# Patient Record
Sex: FEMALE | Race: WHITE | NOT HISPANIC OR LATINO | Employment: OTHER | URBAN - NONMETROPOLITAN AREA
[De-identification: names, ages, dates, MRNs, and addresses within clinical notes are randomized per-mention and may not be internally consistent; named-entity substitution may affect disease eponyms.]

---

## 2019-05-07 ENCOUNTER — APPOINTMENT (OUTPATIENT)
Dept: LAB | Facility: CLINIC | Age: 69
End: 2019-05-07
Payer: COMMERCIAL

## 2019-05-07 ENCOUNTER — TRANSCRIBE ORDERS (OUTPATIENT)
Dept: LAB | Facility: CLINIC | Age: 69
End: 2019-05-07

## 2019-05-07 DIAGNOSIS — I10 ESSENTIAL HYPERTENSION, MALIGNANT: Primary | ICD-10-CM

## 2019-05-07 DIAGNOSIS — R53.83 FATIGUE, UNSPECIFIED TYPE: ICD-10-CM

## 2019-05-07 DIAGNOSIS — Z79.1 ENCOUNTER FOR LONG-TERM (CURRENT) USE OF NON-STEROIDAL ANTI-INFLAMMATORIES: ICD-10-CM

## 2019-05-07 DIAGNOSIS — I10 ESSENTIAL HYPERTENSION, MALIGNANT: ICD-10-CM

## 2019-05-07 DIAGNOSIS — E55.9 AVITAMINOSIS D: ICD-10-CM

## 2019-05-07 LAB
25(OH)D3 SERPL-MCNC: 8.5 NG/ML (ref 30–100)
ALBUMIN SERPL BCP-MCNC: 4.1 G/DL (ref 3.5–5)
ALP SERPL-CCNC: 115 U/L (ref 46–116)
ALT SERPL W P-5'-P-CCNC: 29 U/L (ref 12–78)
ANION GAP SERPL CALCULATED.3IONS-SCNC: 7 MMOL/L (ref 4–13)
AST SERPL W P-5'-P-CCNC: 20 U/L (ref 5–45)
BILIRUB SERPL-MCNC: 0.64 MG/DL (ref 0.2–1)
BUN SERPL-MCNC: 22 MG/DL (ref 5–25)
CALCIUM SERPL-MCNC: 9.1 MG/DL (ref 8.3–10.1)
CHLORIDE SERPL-SCNC: 106 MMOL/L (ref 100–108)
CHOLEST SERPL-MCNC: 180 MG/DL (ref 50–200)
CO2 SERPL-SCNC: 26 MMOL/L (ref 21–32)
CREAT SERPL-MCNC: 0.7 MG/DL (ref 0.6–1.3)
ERYTHROCYTE [DISTWIDTH] IN BLOOD BY AUTOMATED COUNT: 13.5 % (ref 11.6–15.1)
GFR SERPL CREATININE-BSD FRML MDRD: 88 ML/MIN/1.73SQ M
GLUCOSE P FAST SERPL-MCNC: 100 MG/DL (ref 65–99)
HCT VFR BLD AUTO: 44.4 % (ref 34.8–46.1)
HDLC SERPL-MCNC: 62 MG/DL (ref 40–60)
HGB BLD-MCNC: 14.2 G/DL (ref 11.5–15.4)
LDLC SERPL CALC-MCNC: 87 MG/DL (ref 0–100)
MCH RBC QN AUTO: 29.9 PG (ref 26.8–34.3)
MCHC RBC AUTO-ENTMCNC: 32 G/DL (ref 31.4–37.4)
MCV RBC AUTO: 94 FL (ref 82–98)
NONHDLC SERPL-MCNC: 118 MG/DL
PLATELET # BLD AUTO: 252 THOUSANDS/UL (ref 149–390)
PMV BLD AUTO: 9.8 FL (ref 8.9–12.7)
POTASSIUM SERPL-SCNC: 4.2 MMOL/L (ref 3.5–5.3)
PROT SERPL-MCNC: 7.9 G/DL (ref 6.4–8.2)
RBC # BLD AUTO: 4.75 MILLION/UL (ref 3.81–5.12)
SODIUM SERPL-SCNC: 139 MMOL/L (ref 136–145)
T4 FREE SERPL-MCNC: 1.22 NG/DL (ref 0.76–1.46)
TRIGL SERPL-MCNC: 153 MG/DL
TSH SERPL DL<=0.05 MIU/L-ACNC: 2.09 UIU/ML (ref 0.36–3.74)
WBC # BLD AUTO: 8.45 THOUSAND/UL (ref 4.31–10.16)

## 2019-05-07 PROCEDURE — 84439 ASSAY OF FREE THYROXINE: CPT

## 2019-05-07 PROCEDURE — 82306 VITAMIN D 25 HYDROXY: CPT

## 2019-05-07 PROCEDURE — 36415 COLL VENOUS BLD VENIPUNCTURE: CPT

## 2019-05-07 PROCEDURE — 80053 COMPREHEN METABOLIC PANEL: CPT

## 2019-05-07 PROCEDURE — 80061 LIPID PANEL: CPT

## 2019-05-07 PROCEDURE — 85027 COMPLETE CBC AUTOMATED: CPT

## 2019-05-07 PROCEDURE — 86803 HEPATITIS C AB TEST: CPT

## 2019-05-07 PROCEDURE — 84443 ASSAY THYROID STIM HORMONE: CPT

## 2019-05-08 LAB — HCV AB SER QL: NORMAL

## 2019-08-26 ENCOUNTER — HOSPITAL ENCOUNTER (INPATIENT)
Facility: HOSPITAL | Age: 69
LOS: 3 days | DRG: 871 | End: 2019-08-29
Attending: FAMILY MEDICINE | Admitting: INTERNAL MEDICINE
Payer: COMMERCIAL

## 2019-08-26 ENCOUNTER — APPOINTMENT (EMERGENCY)
Dept: CT IMAGING | Facility: HOSPITAL | Age: 69
DRG: 871 | End: 2019-08-26
Payer: COMMERCIAL

## 2019-08-26 ENCOUNTER — APPOINTMENT (EMERGENCY)
Dept: RADIOLOGY | Facility: HOSPITAL | Age: 69
DRG: 871 | End: 2019-08-26
Payer: COMMERCIAL

## 2019-08-26 ENCOUNTER — APPOINTMENT (INPATIENT)
Dept: NON INVASIVE DIAGNOSTICS | Facility: HOSPITAL | Age: 69
DRG: 871 | End: 2019-08-26
Payer: COMMERCIAL

## 2019-08-26 DIAGNOSIS — J18.9 BILATERAL PNEUMONIA: ICD-10-CM

## 2019-08-26 DIAGNOSIS — I48.91 ATRIAL FIBRILLATION WITH RAPID VENTRICULAR RESPONSE (HCC): ICD-10-CM

## 2019-08-26 DIAGNOSIS — R93.89 ABNORMAL CT OF THE CHEST: ICD-10-CM

## 2019-08-26 DIAGNOSIS — I51.7 CARDIOMEGALY: ICD-10-CM

## 2019-08-26 DIAGNOSIS — A41.9 SEPSIS (HCC): Primary | ICD-10-CM

## 2019-08-26 DIAGNOSIS — R09.02 HYPOXIA: ICD-10-CM

## 2019-08-26 DIAGNOSIS — J96.01 ACUTE RESPIRATORY FAILURE WITH HYPOXIA (HCC): ICD-10-CM

## 2019-08-26 DIAGNOSIS — N30.00 ACUTE CYSTITIS: ICD-10-CM

## 2019-08-26 DIAGNOSIS — I10 HYPERTENSION: ICD-10-CM

## 2019-08-26 DIAGNOSIS — E66.01 MORBID OBESITY (HCC): ICD-10-CM

## 2019-08-26 DIAGNOSIS — K80.20 CHOLELITHIASIS: ICD-10-CM

## 2019-08-26 DIAGNOSIS — J90 PLEURAL EFFUSION, BILATERAL: ICD-10-CM

## 2019-08-26 DIAGNOSIS — I48.91 ATRIAL FIBRILLATION WITH RVR (HCC): ICD-10-CM

## 2019-08-26 LAB
ALBUMIN SERPL BCP-MCNC: 4.2 G/DL (ref 3.5–5.7)
ALP SERPL-CCNC: 98 U/L (ref 55–165)
ALT SERPL W P-5'-P-CCNC: 18 U/L (ref 7–52)
ANION GAP SERPL CALCULATED.3IONS-SCNC: 11 MMOL/L (ref 4–13)
APTT PPP: 34 SECONDS (ref 23–37)
AST SERPL W P-5'-P-CCNC: 13 U/L (ref 13–39)
ATRIAL RATE: 170 BPM
BACTERIA UR QL AUTO: ABNORMAL /HPF
BASOPHILS # BLD AUTO: 0.1 THOUSANDS/ΜL (ref 0–0.1)
BASOPHILS NFR BLD AUTO: 0 % (ref 0–2)
BILIRUB SERPL-MCNC: 1.5 MG/DL (ref 0.2–1)
BILIRUB UR QL STRIP: NEGATIVE
BNP SERPL-MCNC: 499 PG/ML (ref 1–100)
BUN SERPL-MCNC: 20 MG/DL (ref 7–25)
CALCIUM SERPL-MCNC: 9.4 MG/DL (ref 8.6–10.5)
CHLORIDE SERPL-SCNC: 101 MMOL/L (ref 98–107)
CLARITY UR: ABNORMAL
CO2 SERPL-SCNC: 24 MMOL/L (ref 21–31)
COLOR UR: YELLOW
CREAT SERPL-MCNC: 0.83 MG/DL (ref 0.6–1.2)
DEPRECATED D DIMER PPP: 402 NG/ML (FEU)
EOSINOPHIL # BLD AUTO: 0 THOUSAND/ΜL (ref 0–0.61)
EOSINOPHIL NFR BLD AUTO: 0 % (ref 0–5)
ERYTHROCYTE [DISTWIDTH] IN BLOOD BY AUTOMATED COUNT: 13.7 % (ref 11.5–14.5)
GFR SERPL CREATININE-BSD FRML MDRD: 72 ML/MIN/1.73SQ M
GLUCOSE SERPL-MCNC: 173 MG/DL (ref 65–99)
GLUCOSE UR STRIP-MCNC: NEGATIVE MG/DL
HCT VFR BLD AUTO: 40.7 % (ref 42–47)
HGB BLD-MCNC: 13.5 G/DL (ref 12–16)
HGB UR QL STRIP.AUTO: ABNORMAL
INR PPP: 1.26 (ref 0.9–1.5)
KETONES UR STRIP-MCNC: NEGATIVE MG/DL
LACTATE SERPL-SCNC: 1.3 MMOL/L (ref 0.5–2)
LEUKOCYTE ESTERASE UR QL STRIP: ABNORMAL
LYMPHOCYTES # BLD AUTO: 1 THOUSANDS/ΜL (ref 0.6–4.47)
LYMPHOCYTES NFR BLD AUTO: 6 % (ref 21–51)
MCH RBC QN AUTO: 30.2 PG (ref 26–34)
MCHC RBC AUTO-ENTMCNC: 33.2 G/DL (ref 31–37)
MCV RBC AUTO: 91 FL (ref 81–99)
MONOCYTES # BLD AUTO: 1.7 THOUSAND/ΜL (ref 0.17–1.22)
MONOCYTES NFR BLD AUTO: 9 % (ref 2–12)
NEUTROPHILS # BLD AUTO: 15.7 THOUSANDS/ΜL (ref 1.4–6.5)
NEUTS SEG NFR BLD AUTO: 85 % (ref 42–75)
NITRITE UR QL STRIP: POSITIVE
NON-SQ EPI CELLS URNS QL MICRO: ABNORMAL /HPF
PH UR STRIP.AUTO: 5.5 [PH]
PLATELET # BLD AUTO: 263 THOUSANDS/UL (ref 149–390)
PMV BLD AUTO: 8.7 FL (ref 8.6–11.7)
POTASSIUM SERPL-SCNC: 3.6 MMOL/L (ref 3.5–5.5)
PROT SERPL-MCNC: 7.2 G/DL (ref 6.4–8.9)
PROT UR STRIP-MCNC: ABNORMAL MG/DL
PROTHROMBIN TIME: 14.6 SECONDS (ref 10.2–13)
QRS AXIS: 74 DEGREES
QRSD INTERVAL: 92 MS
QT INTERVAL: 236 MS
QTC INTERVAL: 417 MS
RBC # BLD AUTO: 4.48 MILLION/UL (ref 3.9–5.2)
RBC #/AREA URNS AUTO: ABNORMAL /HPF
SODIUM SERPL-SCNC: 136 MMOL/L (ref 134–143)
SP GR UR STRIP.AUTO: 1.02 (ref 1–1.03)
T WAVE AXIS: 238 DEGREES
TROPONIN I SERPL-MCNC: <0.03 NG/ML
UROBILINOGEN UR QL STRIP.AUTO: 0.2 E.U./DL
VENTRICULAR RATE: 188 BPM
WBC # BLD AUTO: 18.4 THOUSAND/UL (ref 4.8–10.8)
WBC #/AREA URNS AUTO: ABNORMAL /HPF

## 2019-08-26 PROCEDURE — 93005 ELECTROCARDIOGRAM TRACING: CPT

## 2019-08-26 PROCEDURE — 87449 NOS EACH ORGANISM AG IA: CPT | Performed by: PHYSICIAN ASSISTANT

## 2019-08-26 PROCEDURE — 87449 NOS EACH ORGANISM AG IA: CPT | Performed by: INTERNAL MEDICINE

## 2019-08-26 PROCEDURE — 99223 1ST HOSP IP/OBS HIGH 75: CPT | Performed by: INTERNAL MEDICINE

## 2019-08-26 PROCEDURE — 99285 EMERGENCY DEPT VISIT HI MDM: CPT

## 2019-08-26 PROCEDURE — 85730 THROMBOPLASTIN TIME PARTIAL: CPT | Performed by: PHYSICIAN ASSISTANT

## 2019-08-26 PROCEDURE — 83880 ASSAY OF NATRIURETIC PEPTIDE: CPT | Performed by: INTERNAL MEDICINE

## 2019-08-26 PROCEDURE — 85025 COMPLETE CBC W/AUTO DIFF WBC: CPT | Performed by: PHYSICIAN ASSISTANT

## 2019-08-26 PROCEDURE — 81001 URINALYSIS AUTO W/SCOPE: CPT | Performed by: PHYSICIAN ASSISTANT

## 2019-08-26 PROCEDURE — 93306 TTE W/DOPPLER COMPLETE: CPT | Performed by: INTERNAL MEDICINE

## 2019-08-26 PROCEDURE — 36415 COLL VENOUS BLD VENIPUNCTURE: CPT | Performed by: PHYSICIAN ASSISTANT

## 2019-08-26 PROCEDURE — 85610 PROTHROMBIN TIME: CPT | Performed by: PHYSICIAN ASSISTANT

## 2019-08-26 PROCEDURE — 87040 BLOOD CULTURE FOR BACTERIA: CPT | Performed by: PHYSICIAN ASSISTANT

## 2019-08-26 PROCEDURE — 96365 THER/PROPH/DIAG IV INF INIT: CPT

## 2019-08-26 PROCEDURE — 85379 FIBRIN DEGRADATION QUANT: CPT | Performed by: PHYSICIAN ASSISTANT

## 2019-08-26 PROCEDURE — 80053 COMPREHEN METABOLIC PANEL: CPT | Performed by: PHYSICIAN ASSISTANT

## 2019-08-26 PROCEDURE — 93306 TTE W/DOPPLER COMPLETE: CPT

## 2019-08-26 PROCEDURE — 96376 TX/PRO/DX INJ SAME DRUG ADON: CPT

## 2019-08-26 PROCEDURE — 96366 THER/PROPH/DIAG IV INF ADDON: CPT

## 2019-08-26 PROCEDURE — 84484 ASSAY OF TROPONIN QUANT: CPT | Performed by: PHYSICIAN ASSISTANT

## 2019-08-26 PROCEDURE — 83605 ASSAY OF LACTIC ACID: CPT | Performed by: PHYSICIAN ASSISTANT

## 2019-08-26 PROCEDURE — 96375 TX/PRO/DX INJ NEW DRUG ADDON: CPT

## 2019-08-26 PROCEDURE — 71045 X-RAY EXAM CHEST 1 VIEW: CPT

## 2019-08-26 PROCEDURE — 71275 CT ANGIOGRAPHY CHEST: CPT

## 2019-08-26 PROCEDURE — 93010 ELECTROCARDIOGRAM REPORT: CPT | Performed by: INTERNAL MEDICINE

## 2019-08-26 RX ORDER — ALBUTEROL SULFATE 2.5 MG/3ML
10 SOLUTION RESPIRATORY (INHALATION) ONCE
Status: DISCONTINUED | OUTPATIENT
Start: 2019-08-26 | End: 2019-08-26

## 2019-08-26 RX ORDER — ATORVASTATIN CALCIUM 40 MG/1
TABLET, FILM COATED ORAL
COMMUNITY

## 2019-08-26 RX ORDER — DILTIAZEM HYDROCHLORIDE 5 MG/ML
15 INJECTION INTRAVENOUS ONCE
Status: COMPLETED | OUTPATIENT
Start: 2019-08-26 | End: 2019-08-26

## 2019-08-26 RX ORDER — FUROSEMIDE 10 MG/ML
40 INJECTION INTRAMUSCULAR; INTRAVENOUS ONCE
Status: COMPLETED | OUTPATIENT
Start: 2019-08-26 | End: 2019-08-26

## 2019-08-26 RX ORDER — DIGOXIN 0.25 MG/ML
250 INJECTION INTRAMUSCULAR; INTRAVENOUS ONCE
Status: COMPLETED | OUTPATIENT
Start: 2019-08-26 | End: 2019-08-26

## 2019-08-26 RX ORDER — METOPROLOL TARTRATE 5 MG/5ML
5 INJECTION INTRAVENOUS ONCE
Status: COMPLETED | OUTPATIENT
Start: 2019-08-26 | End: 2019-08-26

## 2019-08-26 RX ORDER — ASPIRIN 81 MG/1
81 TABLET ORAL DAILY
Status: DISCONTINUED | OUTPATIENT
Start: 2019-08-26 | End: 2019-08-27

## 2019-08-26 RX ORDER — METHYLPREDNISOLONE SODIUM SUCCINATE 125 MG/2ML
125 INJECTION, POWDER, LYOPHILIZED, FOR SOLUTION INTRAMUSCULAR; INTRAVENOUS ONCE
Status: DISCONTINUED | OUTPATIENT
Start: 2019-08-26 | End: 2019-08-26

## 2019-08-26 RX ORDER — AMLODIPINE BESYLATE AND BENAZEPRIL HYDROCHLORIDE 10; 40 MG/1; MG/1
CAPSULE ORAL DAILY
COMMUNITY
End: 2019-08-31 | Stop reason: HOSPADM

## 2019-08-26 RX ORDER — ONDANSETRON 2 MG/ML
4 INJECTION INTRAMUSCULAR; INTRAVENOUS EVERY 6 HOURS PRN
Status: DISCONTINUED | OUTPATIENT
Start: 2019-08-26 | End: 2019-08-29 | Stop reason: HOSPADM

## 2019-08-26 RX ORDER — CEFTRIAXONE 1 G/50ML
1000 INJECTION, SOLUTION INTRAVENOUS ONCE
Status: COMPLETED | OUTPATIENT
Start: 2019-08-26 | End: 2019-08-26

## 2019-08-26 RX ORDER — DILTIAZEM HYDROCHLORIDE 5 MG/ML
10 INJECTION INTRAVENOUS ONCE
Status: COMPLETED | OUTPATIENT
Start: 2019-08-26 | End: 2019-08-26

## 2019-08-26 RX ORDER — ESCITALOPRAM OXALATE 10 MG/1
20 TABLET ORAL DAILY
Status: DISCONTINUED | OUTPATIENT
Start: 2019-08-26 | End: 2019-08-29 | Stop reason: HOSPADM

## 2019-08-26 RX ORDER — ACETAMINOPHEN 325 MG/1
650 TABLET ORAL EVERY 6 HOURS PRN
Status: DISCONTINUED | OUTPATIENT
Start: 2019-08-26 | End: 2019-08-29 | Stop reason: HOSPADM

## 2019-08-26 RX ORDER — ATORVASTATIN CALCIUM 40 MG/1
40 TABLET, FILM COATED ORAL
Status: DISCONTINUED | OUTPATIENT
Start: 2019-08-26 | End: 2019-08-29 | Stop reason: HOSPADM

## 2019-08-26 RX ORDER — ESCITALOPRAM OXALATE 20 MG/1
TABLET ORAL DAILY
COMMUNITY

## 2019-08-26 RX ORDER — DIGOXIN 0.25 MG/ML
125 INJECTION INTRAMUSCULAR; INTRAVENOUS ONCE
Status: DISCONTINUED | OUTPATIENT
Start: 2019-08-26 | End: 2019-08-26

## 2019-08-26 RX ORDER — IPRATROPIUM BROMIDE AND ALBUTEROL SULFATE 2.5; .5 MG/3ML; MG/3ML
3 SOLUTION RESPIRATORY (INHALATION) ONCE
Status: DISCONTINUED | OUTPATIENT
Start: 2019-08-26 | End: 2019-08-26

## 2019-08-26 RX ADMIN — FUROSEMIDE 40 MG: 10 INJECTION, SOLUTION INTRAMUSCULAR; INTRAVENOUS at 14:49

## 2019-08-26 RX ADMIN — SODIUM CHLORIDE 1000 ML: 0.9 INJECTION, SOLUTION INTRAVENOUS at 09:20

## 2019-08-26 RX ADMIN — IOHEXOL 85 ML: 350 INJECTION, SOLUTION INTRAVENOUS at 11:34

## 2019-08-26 RX ADMIN — DIGOXIN 250 MCG: 250 INJECTION, SOLUTION INTRAMUSCULAR; INTRAVENOUS; PARENTERAL at 19:58

## 2019-08-26 RX ADMIN — SODIUM CHLORIDE 1000 ML: 0.9 INJECTION, SOLUTION INTRAVENOUS at 12:36

## 2019-08-26 RX ADMIN — CEFTRIAXONE 1000 MG: 1 INJECTION, SOLUTION INTRAVENOUS at 13:49

## 2019-08-26 RX ADMIN — DIGOXIN 250 MCG: 0.25 INJECTION INTRAMUSCULAR; INTRAVENOUS at 15:17

## 2019-08-26 RX ADMIN — METOPROLOL TARTRATE 5 MG: 5 INJECTION, SOLUTION INTRAVENOUS at 11:20

## 2019-08-26 RX ADMIN — METOPROLOL TARTRATE 5 MG: 5 INJECTION, SOLUTION INTRAVENOUS at 10:15

## 2019-08-26 RX ADMIN — DILTIAZEM HYDROCHLORIDE 15 MG/HR: 5 INJECTION INTRAVENOUS at 16:53

## 2019-08-26 RX ADMIN — PERFLUTREN 0.8 ML/MIN: 6.52 INJECTION, SUSPENSION INTRAVENOUS at 15:28

## 2019-08-26 RX ADMIN — METOPROLOL TARTRATE 5 MG: 5 INJECTION, SOLUTION INTRAVENOUS at 10:24

## 2019-08-26 RX ADMIN — DILTIAZEM HYDROCHLORIDE 15 MG/HR: 5 INJECTION INTRAVENOUS at 09:39

## 2019-08-26 RX ADMIN — ENOXAPARIN SODIUM 40 MG: 40 INJECTION SUBCUTANEOUS at 14:49

## 2019-08-26 RX ADMIN — DILTIAZEM HYDROCHLORIDE 10 MG: 5 INJECTION INTRAVENOUS at 09:36

## 2019-08-26 RX ADMIN — DILTIAZEM HYDROCHLORIDE 15 MG: 5 INJECTION INTRAVENOUS at 09:17

## 2019-08-26 RX ADMIN — AZITHROMYCIN MONOHYDRATE 500 MG: 500 INJECTION, POWDER, LYOPHILIZED, FOR SOLUTION INTRAVENOUS at 12:39

## 2019-08-26 RX ADMIN — SODIUM CHLORIDE 1000 ML: 0.9 INJECTION, SOLUTION INTRAVENOUS at 10:51

## 2019-08-26 NOTE — H&P
H&P- Wander Valencia 1950, 78 y o  female MRN: [id number]    Unit/Bed#: ICU 03 Encounter: [id number]    Primary Care Provider: Candy Amaya MD   Date and time admitted to hospital: 8/26/2019  9:27 AM      * Sepsis due to pneumonia [facility])  Assessment & Plan  · CTA chest with multifocal pneumonia  · Will continue IV antibiotics with ceftriaxone/Zithromax  · Patient does have a penicillin allergy however stated it was a rash and not an anaphylactic reaction, will monitor while receiving ceftriaxone  · Lactate within normal limits  · Will check sputum culture  · Strep pneumo and urine Legionella  · Trend procalcitonin  · Tylenol as needed    Acute respiratory failure with hypoxia (HCC)  Assessment & Plan  · Unclear etiology, possibly secondary to pneumonia versus CHF  · Will continue oxygen and titrate as tolerated  · CTA chest showed no obvious PE however multifocal pneumonia was noted with bilateral effusions  · Pulmonology consult  · Will check BNP, and give 1 dose of IV Lasix monitor response    Atrial fibrillation with rapid ventricular response ([address] )  Assessment & Plan  · Patient received IV Cardizem as well as IV Lopressor in the emergency room, and was subsequently placed on a Cardizem drip  · Currently on Cardizem drip with heart rate fluctuating between 130 and 150  · Will check echo  · Troponins negative x2  · Cardiology consult    Morbid obesity ([address] )  Assessment & Plan  · Supportive care  · Nutrition eval    Essential hypertension  Assessment & Plan  · BP currently stable  · Will hold patient's home medications while patient is on Cardizem drip    VTE Prophylaxis: Enoxaparin (Lovenox)  Code Status:  Full code  POLST: There is no POLST form on file for this patient (pre-hospital)  Discussion with family:  Spoke to patient's cousin at bedside    Anticipated Length of Stay:  Patient will be admitted on an Inpatient basis with an anticipated length of stay of  > 2 midnights     Justification for ELISE HADDAD Stay:  Sepsis      Chief Complaint:   Shortness of breath    History of Present Illness:    Geovani Pemberton is a 78 y o  female who presents with shortness of breath  Patient states that for the last few days she has been having increasing shortness of breath worse with exertion  Patient has also had cough productive of clear sputum  Denies any chest pain or palpitations  Today shortness of breath became worse and patient presented to the ER  In the ER patient was noted to be in rapid AFib and given IV Cardizem as well as IV Lopressor  Patient was started on Cardizem drip  CTA chest was negative for PE however patient was found to have multifocal pneumonia and started on IV antibiotics  Review of Systems:  Review of Systems   Constitutional: Positive for activity change and fatigue  Respiratory: Positive for cough and shortness of breath  Cardiovascular: Positive for leg swelling  Negative for chest pain and palpitations  Gastrointestinal: Negative for abdominal pain and diarrhea  Genitourinary: Negative for dysuria  Neurological: Negative for syncope and light-headedness  All other systems reviewed and are negative  Past Medical and Surgical History:   Past Medical History:   Diagnosis Date    Anxiety     Hyperlipidemia     Hypertension     Obesity        Past Surgical History:   Procedure Laterality Date    TONSILLECTOMY         Meds/Allergies:  Prior to Admission medications    Medication Sig Start Date End Date Taking? Authorizing Provider   amLODIPine-benazepril (LOTREL) 10-40 MG per capsule Daily   Yes Historical Provider, MD   ASPIRIN 81 PO Daily 4/5/19  Yes Historical Provider, MD   atorvastatin (LIPITOR) 40 mg tablet atorvastatin 40 mg tablet   take one tab by mouth once daily   Yes Historical Provider, MD   escitalopram (LEXAPRO) 20 mg tablet Daily   Yes Historical Provider, MD     I have reviewed home medications with patient personally  Allergies:    Allergies   Allergen Reactions    Penicillins Rash       Social History:  Marital Status:    Patient Pre-hospital Living Situation:  Lives alone  Patient Pre-hospital Level of Mobility:  Ambulatory  Patient Pre-hospital Diet Restrictions:  None  Substance Use History:     Social History     Substance and Sexual Activity   Alcohol Use Yes    Comment: social     Social History     Tobacco Use   Smoking Status Never Smoker   Smokeless Tobacco Never Used     Social History     Substance and Sexual Activity   Drug Use Never       Family History:  Positive family history of heart disease    Physical Exam:   Vitals:   Blood Pressure: 102/79 (08/26/19 1400)  Pulse: (!) 145 (08/26/19 1400)  Temperature: 98 3 °F (36 8 °C) (08/26/19 1400)  Temp Source: Tympanic (08/26/19 1400)  Respirations: (!) 28 (08/26/19 1400)  Height: 5' 1" (154 9 cm) (08/26/19 1400)  Weight - Scale: 132 kg (291 lb 8 oz) (08/26/19 1400)  SpO2: 90 % (08/26/19 1400)    Physical Exam   Constitutional: No distress  Obese  female   HENT:   Head: Normocephalic and atraumatic  Eyes: Conjunctivae and EOM are normal    Neck: Normal range of motion  Neck supple  Cardiovascular: An irregular rhythm present  Tachycardia present  Pulmonary/Chest: No respiratory distress  Difficult lung exam due to her body habitus  Equal breath sounds bilaterally  Abdominal: Soft  She exhibits no distension  There is no tenderness  Musculoskeletal: Normal range of motion  She exhibits edema  Neurological: She is alert  No cranial nerve deficit  Skin: Skin is warm and dry  Psychiatric: She has a normal mood and affect  Her behavior is normal        Additional Data:   Lab Results: I have personally reviewed pertinent reports        Results from last 7 days   Lab Units 08/26/19  0918   WBC Thousand/uL 18 40*   HEMOGLOBIN g/dL 13 5   HEMATOCRIT % 40 7*   PLATELETS Thousands/uL 263   NEUTROS PCT % 85*   LYMPHS PCT % 6*   MONOS PCT % 9   EOS PCT % 0     Results from last 7 days   Lab Units 08/26/19  0918   POTASSIUM mmol/L 3 6   CHLORIDE mmol/L 101   CO2 mmol/L 24   BUN mg/dL 20   CREATININE mg/dL 0 83   CALCIUM mg/dL 9 4   ALK PHOS U/L 98   ALT U/L 18   AST U/L 13     Results from last 7 days   Lab Units 08/26/19  0918   INR  1 26               Imaging: I have personally reviewed pertinent reports  CTA ED chest PE Study   Final Result by Lupis Mcgee MD (08/26 1200)      Slightly limited study due to respiratory motion artifact  No definite evidence of pulmonary embolism  Bilateral multifocal consolidation compatible with multilobar pneumonia  Small bilateral pleural effusions, presumably parapneumonic in nature  Mild cardiomegaly  Cholelithiasis  Workstation performed: [id number]         XR chest 1 view portable   Final Result by Sameer Escalera MD (08/26 1021)      Bibasilar opacities could be pneumonia or bibasilar edema  Suggestion of trace amount of pleural fluid as well  Cardiomegaly noted  Workstation performed: [id number]             [facility] Records Reviewed: Yes     ** Please Note: This note has been constructed using a voice recognition system   **

## 2019-08-26 NOTE — ASSESSMENT & PLAN NOTE
· Patient received IV Cardizem as well as IV Lopressor in the emergency room, and was subsequently placed on a Cardizem drip  · Currently on Cardizem drip with heart rate fluctuating between 130 and 150  · Will check echo  · Troponins negative x2  · Cardiology consult

## 2019-08-26 NOTE — PLAN OF CARE
Pt admitted at this time  Problem: Prexisting or High Potential for Compromised Skin Integrity  Goal: Skin integrity is maintained or improved  Description  INTERVENTIONS:  - Identify patients at risk for skin breakdown  - Assess and monitor skin integrity  - Assess and monitor nutrition and hydration status  - Monitor labs   - Assess for incontinence   - Turn and reposition patient  - Assist with mobility/ambulation  - Relieve pressure over bony prominences  - Avoid friction and shearing  - Provide appropriate hygiene as needed including keeping skin clean and dry  - Evaluate need for skin moisturizer/barrier cream  - Collaborate with interdisciplinary team   - Patient/family teaching  - Consider wound care consult   Outcome: Not Progressing     Problem: Potential for Falls  Goal: Patient will remain free of falls  Description  INTERVENTIONS:  - Assess patient frequently for physical needs  -  Identify cognitive and physical deficits and behaviors that affect risk of falls    -  [place] fall precautions as indicated by assessment   - Educate patient/family on patient safety including physical limitations  - Instruct patient to call for assistance with activity based on assessment  - Modify environment to reduce risk of injury  - Consider OT/PT consult to assist with strengthening/mobility  Outcome: Not Progressing     Problem: PAIN - ADULT  Goal: Verbalizes/displays adequate comfort level or baseline comfort level  Description  Interventions:  - Encourage patient to monitor pain and request assistance  - Assess pain using appropriate pain scale  - Administer analgesics based on type and severity of pain and evaluate response  - Implement non-pharmacological measures as appropriate and evaluate response  - Consider cultural and social influences on pain and pain management  - Notify physician/advanced practitioner if interventions unsuccessful or patient reports new pain  Outcome: Not Progressing     Problem: INFECTION - ADULT  Goal: Absence or prevention of progression during hospitalization  Description  INTERVENTIONS:  - Assess and monitor for signs and symptoms of infection  - Monitor lab/diagnostic results  - Monitor all insertion sites, i e  indwelling lines, tubes, and drains  - Monitor endotracheal if appropriate and nasal secretions for changes in amount and color  - [place] appropriate cooling/warming therapies per order  - Administer medications as ordered  - Instruct and encourage patient and family to use good hand hygiene technique  - Identify and instruct in appropriate isolation precautions for identified infection/condition  Outcome: Not Progressing  Goal: Absence of fever/infection during neutropenic period  Description  INTERVENTIONS:  - Monitor WBC    Outcome: Not Progressing     Problem: SAFETY ADULT  Goal: Maintain or return to baseline ADL function  Description  INTERVENTIONS:  -  Assess patient's ability to carry out ADLs; assess patient's baseline for ADL function and identify physical deficits which impact ability to perform ADLs (bathing, care of mouth/teeth, toileting, grooming, dressing, etc )  - Assess/evaluate cause of self-care deficits   - Assess range of motion  - Assess patient's mobility; develop plan if impaired  - Assess patient's need for assistive devices and provide as appropriate  - Encourage maximum independence but intervene and supervise when necessary  - Involve family in performance of ADLs  - Assess for home care needs following discharge   - Consider OT consult to assist with ADL evaluation and planning for discharge  - Provide patient education as appropriate  Outcome: Not Progressing  Goal: Maintain or return mobility status to optimal level  Description  INTERVENTIONS:  - Assess patient's baseline mobility status (ambulation, transfers, stairs, etc )    - Identify cognitive and physical deficits and behaviors that affect mobility  - Identify mobility aids required to assist with transfers and/or ambulation (gait belt, sit-to-stand, lift, walker, cane, etc )  - [place] fall precautions as indicated by assessment  - Record patient progress and toleration of activity level on Mobility SBAR; progress patient to next Phase/Stage  - Instruct patient to call for assistance with activity based on assessment  - Consider rehabilitation consult to assist with strengthening/weightbearing, etc   Outcome: Not Progressing     Problem: DISCHARGE PLANNING  Goal: Discharge to home or other facility with appropriate resources  Description  INTERVENTIONS:  - Identify barriers to discharge w/patient and caregiver  - Arrange for needed discharge resources and transportation as appropriate  - Identify discharge learning needs (meds, wound care, etc )  - Arrange for interpretive services to assist at discharge as needed  - Refer to Case Management Department for coordinating discharge planning if the patient needs post-hospital services based on physician/advanced practitioner order or complex needs related to functional status, cognitive ability, or social support system  Outcome: Not Progressing     Problem: Knowledge Deficit  Goal: Patient/family/caregiver demonstrates understanding of disease process, treatment plan, medications, and discharge instructions  Description  Complete learning assessment and assess knowledge base    Interventions:  - Provide teaching at level of understanding  - Provide teaching via preferred learning methods  Outcome: Not Progressing     Problem: NEUROSENSORY - ADULT  Goal: Achieves stable or improved neurological status  Description  INTERVENTIONS  - Monitor and report changes in neurological status  - Monitor vital signs such as temperature, blood pressure, glucose, and any other labs ordered   - Initiate measures to prevent increased intracranial pressure  - Monitor for seizure activity and implement precautions if appropriate      Outcome: Not Progressing  Goal: Remains free of injury related to seizures activity  Description  INTERVENTIONS  - Maintain airway, patient safety  and administer oxygen as ordered  - Monitor patient for seizure activity, document and report duration and description of seizure to physician/advanced practitioner  - If seizure occurs,  ensure patient safety during seizure  - Reorient patient post seizure  - Seizure pads on all 4 side rails  - Instruct patient/family to notify RN of any seizure activity including if an aura is experienced  - Instruct patient/family to call for assistance with activity based on nursing assessment  - Administer anti-seizure medications if ordered    Outcome: Not Progressing  Goal: Achieves maximal functionality and self care  Description  INTERVENTIONS  - Monitor swallowing and airway patency with patient fatigue and changes in neurological status  - Encourage and assist patient to increase activity and self care     - Encourage visually impaired, hearing impaired and aphasic patients to use assistive/communication devices  Outcome: Not Progressing     Problem: CARDIOVASCULAR - ADULT  Goal: Maintains optimal cardiac output and hemodynamic stability  Description  INTERVENTIONS:  - Monitor I/O, vital signs and rhythm  - Monitor for S/S and trends of decreased cardiac output  - Administer and titrate ordered vasoactive medications to optimize hemodynamic stability  - Assess quality of pulses, skin color and temperature  - Assess for signs of decreased coronary artery perfusion  - Instruct patient to report change in severity of symptoms  Outcome: Not Progressing  Goal: Absence of cardiac dysrhythmias or at baseline rhythm  Description  INTERVENTIONS:  - Continuous cardiac monitoring, vital signs, obtain 12 lead EKG if ordered  - Administer antiarrhythmic and heart rate control medications as ordered  - Monitor electrolytes and administer replacement therapy as ordered  Outcome: Not Progressing     Problem: RESPIRATORY - ADULT  Goal: Achieves optimal ventilation and oxygenation  Description  INTERVENTIONS:  - Assess for changes in respiratory status  - Assess for changes in mentation and behavior  - Position to facilitate oxygenation and minimize respiratory effort  - Oxygen administered by appropriate delivery if ordered  - Initiate smoking cessation education as indicated  - Encourage broncho-pulmonary hygiene including cough, deep breathe, Incentive Spirometry  - Assess the need for suctioning and aspirate as needed  - Assess and instruct to report SOB or any respiratory difficulty  - Respiratory Therapy support as indicated  Outcome: Not Progressing     Problem: GASTROINTESTINAL - ADULT  Goal: Minimal or absence of nausea and/or vomiting  Description  INTERVENTIONS:  - Administer IV fluids if ordered to ensure adequate hydration  - Maintain NPO status until nausea and vomiting are resolved  - Nasogastric tube if ordered  - Administer ordered antiemetic medications as needed  - Provide nonpharmacologic comfort measures as appropriate  - Advance diet as tolerated, if ordered  - Consider nutrition services referral to assist patient with adequate nutrition and appropriate food choices  Outcome: Not Progressing  Goal: Maintains or returns to baseline bowel function  Description  INTERVENTIONS:  - Assess bowel function  - Encourage oral fluids to ensure adequate hydration  - Administer IV fluids if ordered to ensure adequate hydration  - Administer ordered medications as needed  - Encourage mobilization and activity  - Consider nutritional services referral to assist patient with adequate nutrition and appropriate food choices  Outcome: Not Progressing  Goal: Maintains adequate nutritional intake  Description  INTERVENTIONS:  - Monitor percentage of each meal consumed  - Identify factors contributing to decreased intake, treat as appropriate  - Assist with meals as needed  - Monitor I&O, weight, and lab values if indicated  - Obtain nutrition services referral as needed  Outcome: Not Progressing  Goal: Establish and maintain optimal ostomy function  Description  INTERVENTIONS:  - Assess bowel function  - Encourage oral fluids to ensure adequate hydration  - Administer IV fluids if ordered to ensure adequate hydration   - Administer ordered medications as needed  - Encourage mobilization and activity  - Nutrition services referral to assist patient with appropriate food choices  - Assess stoma site  - Consider wound care consult   Outcome: Not Progressing     Problem: GENITOURINARY - ADULT  Goal: Maintains or returns to baseline urinary function  Description  INTERVENTIONS:  - Assess urinary function  - Encourage oral fluids to ensure adequate hydration if ordered  - Administer IV fluids as ordered to ensure adequate hydration  - Administer ordered medications as needed  - Offer frequent toileting  - Follow urinary retention protocol if ordered  Outcome: Not Progressing  Goal: Absence of urinary retention  Description  INTERVENTIONS:  - Assess patients ability to void and empty bladder  - Monitor I/O  - Bladder scan as needed  - Discuss with physician/AP medications to alleviate retention as needed  - Discuss catheterization for long term situations as appropriate  Outcome: Not Progressing  Goal: Urinary catheter remains patent  Description  INTERVENTIONS:  - Assess patency of urinary catheter  - If patient has a chronic murcia, consider changing catheter if non-functioning  - Follow guidelines for intermittent irrigation of non-functioning urinary catheter  Outcome: Not Progressing

## 2019-08-26 NOTE — ASSESSMENT & PLAN NOTE
· Unclear etiology, possibly secondary to pneumonia versus CHF  · Will continue oxygen and titrate as tolerated  · CTA chest showed no obvious PE however multifocal pneumonia was noted with bilateral effusions  · Pulmonology consult  · Will check BNP, and give 1 dose of IV Lasix monitor response

## 2019-08-26 NOTE — ASSESSMENT & PLAN NOTE
· CTA chest with multifocal pneumonia  · Will continue IV antibiotics with ceftriaxone/Zithromax  · Patient does have a penicillin allergy however stated it was a rash and not an anaphylactic reaction, will monitor while receiving ceftriaxone  · Lactate within normal limits  · Will check sputum culture  · Strep pneumo and urine Legionella  · Trend procalcitonin  · Tylenol as needed

## 2019-08-26 NOTE — ED PROVIDER NOTES
History  Chief Complaint   Patient presents with    Shortness of Breath     with exersion  onset 08/25/2019     Patient is a 21-year-old female who presents to the emergency room with complaint of shortness of breath which she states began yesterday increasing and worse this morning  She states that any time she gets up and walks or tries to do exertional things the shortness of breath increases  She denies chest pain lightheaded dizziness nausea vomiting  Patient denies fevers chills, abdominal pain or urinary complaints  Patient states in April she was in [place] and something similar happen she had to go to the emergency room receive medication at that time for her heart as well  Patient states since that time she came home follow-up with her PCP and she was put on Norvasc  No additional medications and she has not seen a cardiologist since returning to [place]  Patient denies a history of MI, CVA, and coronary artery disease  History provided by:  Patient   used: No    Shortness of Breath   Severity:  Moderate  Onset quality:  Gradual  Duration:  1 day  Timing:  Intermittent  Progression:  Worsening  Chronicity:  New  Context: activity    Relieved by:  Nothing  Worsened by:   Activity and exertion  Ineffective treatments:  None tried  Associated symptoms: cough    Associated symptoms: no abdominal pain, no chest pain, no diaphoresis, no ear pain, no fever, no headaches, no hemoptysis, no neck pain, no rash, no sore throat, no sputum production, no syncope, no vomiting and no wheezing    Cough:     Cough characteristics:  Non-productive    Severity:  Mild    Onset quality:  Gradual    Duration:  1 week    Timing:  Intermittent    Progression:  Unchanged    Chronicity:  New  Risk factors: no recent alcohol use, no hx of PE/DVT and no tobacco use        Allergies   Allergen Reactions    Penicillins          Prior to Admission Medications   Prescriptions Last Dose Informant Patient Reported? Taking? ASPIRIN 81 PO   Yes Yes   Sig: Daily   amLODIPine-benazepril (LOTREL) 10-40 MG per capsule   Yes No   Sig: Daily   atorvastatin (LIPITOR) 40 mg tablet   Yes No   Sig: atorvastatin 40 mg tablet   take one tab by mouth once daily   escitalopram (LEXAPRO) 20 mg tablet   Yes No   Sig: Daily      Facility-Administered Medications: None       Past Medical History:   Diagnosis Date    Anxiety     Hyperlipidemia     Hypertension        Past Surgical History:   Procedure Laterality Date    TONSILLECTOMY         History reviewed  No pertinent family history  I have reviewed and agree with the history as documented  Social History     Tobacco Use    Smoking status: Never Smoker    Smokeless tobacco: Never Used   Substance Use Topics    Alcohol use: Yes     Comment: social    Drug use: Never        Review of Systems   Constitutional: Negative for chills, diaphoresis, fatigue and fever  HENT: Negative for congestion, ear pain, rhinorrhea, sneezing and sore throat  Respiratory: Positive for cough and shortness of breath  Negative for hemoptysis, sputum production, chest tightness, wheezing and stridor  Cardiovascular: Negative for chest pain, palpitations, leg swelling and syncope  Gastrointestinal: Negative for abdominal distention, abdominal pain, blood in stool, constipation, diarrhea, nausea and vomiting  Genitourinary: Negative for difficulty urinating, dysuria, frequency, hematuria and urgency  Musculoskeletal: Negative for gait problem, myalgias and neck pain  Skin: Negative for rash  Neurological: Negative for dizziness, syncope, weakness, light-headedness and headaches  All other systems reviewed and are negative  Physical Exam  Physical Exam   Constitutional: She is oriented to person, place, and time  She appears well-developed and well-nourished  HENT:   Head: Normocephalic and atraumatic     Mouth/Throat: Oropharynx is clear and moist    Eyes: Pupils are equal, round, and reactive to light  EOM are normal    Neck: Normal range of motion  Neck supple  No tracheal deviation present  Cardiovascular: Normal heart sounds and intact distal pulses  An irregularly irregular rhythm present  Tachycardia present  Pulmonary/Chest: Effort normal and breath sounds normal  No respiratory distress  She has no wheezes  Abdominal: Soft  Bowel sounds are normal  She exhibits no distension  There is no tenderness  Musculoskeletal: Normal range of motion  She exhibits no edema or tenderness  Neurological: She is alert and oriented to person, place, and time  No cranial nerve deficit  She exhibits normal muscle tone  Coordination normal    Skin: Skin is warm and dry  No rash noted  No erythema  Nursing note and vitals reviewed        Vital Signs  ED Triage Vitals   Temperature Pulse Respirations Blood Pressure SpO2   08/26/19 0904 08/26/19 0904 08/26/19 0904 08/26/19 0904 08/26/19 0904   98 5 °F (36 9 °C) (!) 180 20 142/97 (!) 89 %      Temp Source Heart Rate Source Patient Position - Orthostatic VS BP Location FiO2 (%)   08/26/19 1245 08/26/19 0904 08/26/19 0945 08/26/19 0904 --   Temporal Monitor Sitting Left arm       Pain Score       08/26/19 0904       No Pain           Vitals:    08/26/19 1145 08/26/19 1200 08/26/19 1215 08/26/19 1245   BP: 137/71 122/73     Pulse: (!) 138 (!) 139 (!) 144 (!) 139   Patient Position - Orthostatic VS: Sitting Sitting           Visual Acuity      ED Medications  Medications   cefTRIAXone (ROCEPHIN) IVPB (premix) 1,000 mg (has no administration in time range)   azithromycin (ZITHROMAX) 500 mg in sodium chloride 0 9 % 250 mL IVPB (500 mg Intravenous New Bag 8/26/19 1239)   sodium chloride 0 9 % bolus 1,000 mL (1,000 mL Intravenous New Bag 8/26/19 1236)   diltiazem (CARDIZEM) injection 15 mg (15 mg Intravenous Given 8/26/19 0917)   diltiazem (CARDIZEM) 125 mg in sodium chloride 0 9 % 125 mL infusion (15 mg/hr Intravenous New Bag 8/26/19 4433)   sodium chloride 0 9 % bolus 1,000 mL (0 mL Intravenous Stopped 8/26/19 1051)   diltiazem (CARDIZEM) injection 10 mg (10 mg Intravenous Given 8/26/19 0936)   metoprolol (LOPRESSOR) injection 5 mg (5 mg Intravenous Given 8/26/19 1015)   metoprolol (LOPRESSOR) injection 5 mg (5 mg Intravenous Given 8/26/19 1024)   sodium chloride 0 9 % bolus 1,000 mL (0 mL Intravenous Stopped 8/26/19 1159)   metoprolol (LOPRESSOR) injection 5 mg (5 mg Intravenous Given 8/26/19 1120)   iohexol (OMNIPAQUE) 350 MG/ML injection (MULTI-DOSE) 85 mL (85 mL Intravenous Given 8/26/19 1134)       Diagnostic Studies  Results Reviewed     Procedure Component Value Units Date/Time    Lactic acid, plasma [[id number]] Collected:  08/26/19 1239    Lab Status: In process Specimen:  Blood from Arm, Left Updated:  08/26/19 1255    Troponin I [[id number]] Collected:  08/26/19 1239    Lab Status: In process Specimen:  Blood from Arm, Left Updated:  08/26/19 1255    Troponin I [[id number]] Collected:  08/26/19 1240    Lab Status:  No result Specimen:  Blood from Arm, Left     Blood culture #2 [[id number]] Collected:  08/26/19 1239    Lab Status:  No result Specimen:  Blood from Arm, Left     Blood culture #1 [[id number]] Collected:  08/26/19 1235    Lab Status:  No result Specimen:  Blood from Arm, Left     Legionella antigen, urine [[id number]] Collected:  08/26/19 1112    Lab Status:   In process Specimen:  Urine, Clean Catch Updated:  08/26/19 1224    Urine Microscopic [[id number]]  (Abnormal) Collected:  08/26/19 1112    Lab Status:  Final result Specimen:  Urine, Other Updated:  08/26/19 1140     RBC, UA 2-4 /hpf      WBC, UA 4-10 /hpf      Epithelial Cells Moderate /hpf      Bacteria, UA Moderate /hpf     UA w Reflex to Microscopic w Reflex to Culture [[id number]]  (Abnormal) Collected:  08/26/19 1112    Lab Status:  Final result Specimen:  Urine, Other Updated:  08/26/19 1128     Color, UA Yellow     Clarity, UA Cloudy     Specific [place], UA 1 020 pH, UA 5 5     Leukocytes, UA Trace     Nitrite, UA Positive     Protein, UA Trace mg/dl      Glucose, UA Negative mg/dl      Ketones, UA Negative mg/dl      Urobilinogen, UA 0 2 E U /dl      Bilirubin, UA Negative     Blood, UA Trace-Intact    Protime-INR [[id number]]  (Abnormal) Collected:  08/26/19 0918    Lab Status:  Final result Specimen:  Blood from Arm, Right Updated:  08/26/19 1001     Protime 14 6 seconds      INR 1 26    APTT [[id number]]  (Normal) Collected:  08/26/19 0918    Lab Status:  Final result Specimen:  Blood from Arm, Right Updated:  08/26/19 1001     PTT 34 seconds     D-Dimer [[id number]]  (Abnormal) Collected:  08/26/19 0918    Lab Status:  Final result Specimen:  Blood from Arm, Right Updated:  08/26/19 1001     D-Dimer, Quant 402 ng/ml (FEU)     Troponin I [[id number]]  (Normal) Collected:  08/26/19 0918    Lab Status:  Final result Specimen:  Blood from Arm, Right Updated:  08/26/19 0955     Troponin I <0 03 ng/mL     Comprehensive metabolic panel [[id number]]  (Abnormal) Collected:  08/26/19 0918    Lab Status:  Final result Specimen:  Blood from Arm, Right Updated:  08/26/19 0954     Sodium 136 mmol/L      Potassium 3 6 mmol/L      Chloride 101 mmol/L      CO2 24 mmol/L      ANION GAP 11 mmol/L      BUN 20 mg/dL      Creatinine 0 83 mg/dL      Glucose 173 mg/dL      Calcium 9 4 mg/dL      AST 13 U/L      ALT 18 U/L      Alkaline Phosphatase 98 U/L      Total Protein 7 2 g/dL      Albumin 4 2 g/dL      Total Bilirubin 1 50 mg/dL      eGFR 72 ml/min/1 73sq m     Narrative:       [facility] guidelines for Chronic Kidney Disease (CKD):     Stage 1 with normal or high GFR (GFR > 90 mL/min/1 73 square meters)    Stage 2 Mild CKD (GFR = 60-89 mL/min/1 73 square meters)    Stage 3A Moderate CKD (GFR = 45-59 mL/min/1 73 square meters)    Stage 3B Moderate CKD (GFR = 30-44 mL/min/1 73 square meters)    Stage 4 Severe CKD (GFR = 15-29 mL/min/1 73 square meters)   Stage 5 End Stage CKD (GFR <15 mL/min/1 73 square meters)  Note: GFR calculation is accurate only with a steady state creatinine    CBC and differential [[id number]]  (Abnormal) Collected:  08/26/19 0918    Lab Status:  Final result Specimen:  Blood from Arm, Right Updated:  08/26/19 0942     WBC 18 40 Thousand/uL      RBC 4 48 Million/uL      Hemoglobin 13 5 g/dL      Hematocrit 40 7 %      MCV 91 fL      MCH 30 2 pg      MCHC 33 2 g/dL      RDW 13 7 %      MPV 8 7 fL      Platelets 051 Thousands/uL      Neutrophils Relative 85 %      Lymphocytes Relative 6 %      Monocytes Relative 9 %      Eosinophils Relative 0 %      Basophils Relative 0 %      Neutrophils Absolute 15 70 Thousands/µL      Lymphocytes Absolute 1 00 Thousands/µL      Monocytes Absolute 1 70 Thousand/µL      Eosinophils Absolute 0 00 Thousand/µL      Basophils Absolute 0 10 Thousands/µL                  CTA ED chest PE Study   Final Result by Ira Parker MD (08/26 1200)      Slightly limited study due to respiratory motion artifact  No definite evidence of pulmonary embolism  Bilateral multifocal consolidation compatible with multilobar pneumonia  Small bilateral pleural effusions, presumably parapneumonic in nature  Mild cardiomegaly  Cholelithiasis  Workstation performed: [id number]         XR chest 1 view portable   Final Result by Aleyda Sebastian MD (08/26 1021)      Bibasilar opacities could be pneumonia or bibasilar edema  Suggestion of trace amount of pleural fluid as well  Cardiomegaly noted              Workstation performed: [id number]                    Procedures  ECG 12 Lead Documentation Only  Date/Time: 8/26/2019 9:48 AM  Performed by: Giovana Terry PA-C  Authorized by: Giovana Terry PA-C     Indications / Diagnosis:  Shortness of breath  ECG reviewed by me, the ED Provider: yes (& Dr Claudean Stai)    Patient location:  ED  Previous ECG:     Previous ECG:  Unavailable  Interpretation: Interpretation: abnormal    Rate:     ECG rate:  188    ECG rate assessment: tachycardic    Rhythm:     Rhythm: atrial fibrillation    Ectopy:     Ectopy: none    Conduction:     Conduction: normal    ST segments:     ST segments:  Non-specific  T waves:     T waves: non-specific    CriticalCare Time  Performed by: Nissa Cheema PA-C  Authorized by: Nissa Cheema PA-C     Critical care provider statement:     Critical care time (minutes):  150    Critical care end time:  8/26/2019 12:48 PM    Critical care time was exclusive of:  Separately billable procedures and treating other patients    Critical care was necessary to treat or prevent imminent or life-threatening deterioration of the following conditions:  Cardiac failure, circulatory failure, dehydration, metabolic crisis, sepsis and respiratory failure    Critical care was time spent personally by me on the following activities:  Obtaining history from patient or surrogate, development of treatment plan with patient or surrogate, evaluation of patient's response to treatment, examination of patient, ordering and performing treatments and interventions, ordering and review of laboratory studies, ordering and review of radiographic studies, re-evaluation of patient's condition and review of old charts    I assumed direction of critical care for this patient from another provider in my specialty: no    Comments:      77-year-old female presenting with severe shortness of breath heart rate in 180s, patient found to be in AFib RVR requiring Cardizem bolus x2 and drip as well as metoprolol bolus x3  Patient remained on cardiac monitor, fluids, labs, sepsis workup, with findings of acute cystitis and multi lobar pneumonia    Treating with IV fluids, calcium channel and beta blockers, iv antibiotics and admission to ICU, patient's heart rate improved following administration of 3 rounds metoprolol bolus, heart rate remains in the 120s, irregularly irregular AFib            ED Course  ED Course as of Aug 26 1317   Mon Aug 26, 2019   3218 Patient in AFib RVR initial bolus of 15 mg Cardizem given patient has not broken abnormal rhythm still remains tachycardic in the 160s to 170s, blood pressure holding in the 352 systolic  Will bolus a 2nd time with 10 mg Cardizem  1100 Metoprolol 5mg given x 2 with improvement in hr, 120s-140s  Will continue cardizem drip and await ct chest       1206 Patient's heart rate were controlled following administration 3rd dose metoprolol 5 mg IV  Additionally CT a her no evidence of PE however multilobar pneumonia  Call placed to hospitalist for ICU admission  1 D/w Dr Edgar Smith, will admit to icu              HEART Risk Score      Most Recent Value   History  1 Filed at: 08/26/2019 1317   ECG  1 Filed at: 08/26/2019 1317   Age  2 Filed at: 08/26/2019 1317   Risk Factors  1 Filed at: 08/26/2019 1317   Troponin  0 Filed at: 08/26/2019 1317   Heart Score Risk Calculator   History  1 Filed at: 08/26/2019 1317   ECG  1 Filed at: 08/26/2019 1317   Age  2 Filed at: 08/26/2019 1317   Risk Factors  1 Filed at: 08/26/2019 1317   Troponin  0 Filed at: 08/26/2019 1317   HEART Score  5 Filed at: 08/26/2019 1317   HEART Score  5 Filed at: 08/26/2019 1317                      Wells' Criteria for PE      Most Recent Value   Wells' Criteria for PE   Clinical signs and symptoms of DVT  3 Filed at: 08/26/2019 1059   PE is primary diagnosis or equally likely  3 Filed at: 08/26/2019 1059   HR >100  1 5 Filed at: 08/26/2019 1059   Immobilization at least 3 days or Surgery in the previous 4 weeks  0 Filed at: 08/26/2019 1059   Previous, objectively diagnosed PE or DVT  0 Filed at: 08/26/2019 1059   Hemoptysis  0 Filed at: 08/26/2019 1059   Malignancy with treatment within 6 months or palliative  0 Filed at: 08/26/2019 1059   Wells' Criteria Total  7 5 Filed at: 08/26/2019 1059            MDM  Number of Diagnoses or Management Options  Acute cystitis: new and requires workup  Atrial fibrillation with RVR ([facility] ): new and requires workup  Bilateral pneumonia: new and requires workup  Cardiomegaly:   Cholelithiasis:   Hypertension:   Hypoxia: new and requires workup  Pleural effusion, bilateral: new and requires workup  Sepsis [facility]): new and requires workup     Amount and/or Complexity of Data Reviewed  Clinical lab tests: ordered and reviewed  Tests in the radiology section of CPT®: ordered and reviewed  Review and summarize past medical records: yes  Discuss the patient with other providers: yes (hospitalist regarding admisison)  Independent visualization of images, tracings, or specimens: yes    Risk of Complications, Morbidity, and/or Mortality  Presenting problems: high  Diagnostic procedures: high  Management options: high    Patient Progress  Patient progress: stable      Disposition  Final diagnoses:   Sepsis ([place] [address] )   Bilateral pneumonia - Multilobar   Hypoxia   Atrial fibrillation with RVR ([place] [address] )   Acute cystitis   Pleural effusion, bilateral   Cholelithiasis - noted on CT scan   Cardiomegaly   Hypertension     Time reflects when diagnosis was documented in both MDM as applicable and the Disposition within this note     Time User Action Codes Description Comment    8/26/2019 12:17 PM Bozena Glatter Add [A41 9] Sepsis ([facility] 75 )     8/26/2019 12:19 PM Bozena Glatter Add [J18 9] Bilateral pneumonia     8/26/2019 12:19 PM Bozena Glatter Modify [J18 9] Bilateral pneumonia Multilobar    8/26/2019 12:19 PM Bozena Glatter Add [R09 02] Hypoxia     8/26/2019 12:19 PM Bozena Glatter Add [I48 91] Atrial fibrillation with RVR ([place] [address] )     8/26/2019 12:23 PM Bozena Glatter Add [N30 00] Acute cystitis     8/26/2019 12:25 PM Lawrence Reap M Add [J90] Chronic bilateral pleural effusions     8/26/2019 12:25 PM Lawrence Reap M Remove [J90] Chronic bilateral pleural effusions     8/26/2019 12:25 PM Lawrence Reap M Add [J90] Pleural effusion, bilateral     8/26/2019 12:26 PM Yordan Fairbanks Nanci Awe Add [K80 20] Cholelithiasis     8/26/2019 12:26 PM Tj Goins Modify [K80 20] Cholelithiasis noted on CT scan    8/26/2019 12:26 PM Tj Goins Add [I51 7] Cardiomegaly     8/26/2019 12:26 PM Tj Goins Add [I10] Hypertension       ED Disposition     ED Disposition Condition Date/Time Comment    Admit Stable Mon Aug 26, 2019 12:16 PM Case was discussed with Dr Arnoldo Fry and the patient's admission status was agreed to be Admission Status: inpatient status to the service of Dr Arnoldo Fry   Follow-up Information    None         Patient's Medications   Discharge Prescriptions    No medications on file     No discharge procedures on file      ED Provider  Electronically Signed by           Beronica Severino PA-C  08/26/19 6414

## 2019-08-26 NOTE — NURSING NOTE
Pt received from the er earlier in the day, oriented to the unit and the callbell, dr Radha Elliott was in to see and assess the pt, admit orders noted, cardizem gtt cont, MD made aware of hr cont to be 115 to 145 with cardizem gtt, orders received and digoxin given, no cp no sob, pt presently in bed in no acute distress, family at the bedside

## 2019-08-27 PROBLEM — F32.5 MAJOR DEPRESSIVE DISORDER IN FULL REMISSION (HCC): Status: ACTIVE | Noted: 2019-08-27

## 2019-08-27 LAB
ALBUMIN SERPL BCP-MCNC: 3.7 G/DL (ref 3.5–5.7)
ALP SERPL-CCNC: 116 U/L (ref 55–165)
ALT SERPL W P-5'-P-CCNC: 22 U/L (ref 7–52)
ANION GAP SERPL CALCULATED.3IONS-SCNC: 9 MMOL/L (ref 4–13)
AST SERPL W P-5'-P-CCNC: 18 U/L (ref 13–39)
ATRIAL RATE: 82 BPM
BASOPHILS # BLD AUTO: 0 THOUSANDS/ΜL (ref 0–0.1)
BASOPHILS NFR BLD AUTO: 0 % (ref 0–2)
BILIRUB SERPL-MCNC: 1.1 MG/DL (ref 0.2–1)
BUN SERPL-MCNC: 21 MG/DL (ref 7–25)
CALCIUM SERPL-MCNC: 8.7 MG/DL (ref 8.6–10.5)
CHLORIDE SERPL-SCNC: 108 MMOL/L (ref 98–107)
CO2 SERPL-SCNC: 22 MMOL/L (ref 21–31)
CREAT SERPL-MCNC: 0.75 MG/DL (ref 0.6–1.2)
EOSINOPHIL # BLD AUTO: 0 THOUSAND/ΜL (ref 0–0.61)
EOSINOPHIL NFR BLD AUTO: 0 % (ref 0–5)
ERYTHROCYTE [DISTWIDTH] IN BLOOD BY AUTOMATED COUNT: 13.4 % (ref 11.5–14.5)
GFR SERPL CREATININE-BSD FRML MDRD: 81 ML/MIN/1.73SQ M
GLUCOSE SERPL-MCNC: 116 MG/DL (ref 65–99)
HCT VFR BLD AUTO: 36.2 % (ref 42–47)
HGB BLD-MCNC: 12.1 G/DL (ref 12–16)
L PNEUMO1 AG UR QL IA.RAPID: NEGATIVE
L PNEUMO1 AG UR QL IA.RAPID: NEGATIVE
LDH SERPL-CCNC: 201 U/L (ref 81–234)
LYMPHOCYTES # BLD AUTO: 1.1 THOUSANDS/ΜL (ref 0.6–4.47)
LYMPHOCYTES NFR BLD AUTO: 8 % (ref 21–51)
MAGNESIUM SERPL-MCNC: 2.2 MG/DL (ref 1.9–2.7)
MCH RBC QN AUTO: 30.1 PG (ref 26–34)
MCHC RBC AUTO-ENTMCNC: 33.3 G/DL (ref 31–37)
MCV RBC AUTO: 90 FL (ref 81–99)
MONOCYTES # BLD AUTO: 1.4 THOUSAND/ΜL (ref 0.17–1.22)
MONOCYTES NFR BLD AUTO: 10 % (ref 2–12)
NEUTROPHILS # BLD AUTO: 11.7 THOUSANDS/ΜL (ref 1.4–6.5)
NEUTS SEG NFR BLD AUTO: 82 % (ref 42–75)
PLATELET # BLD AUTO: 237 THOUSANDS/UL (ref 149–390)
PMV BLD AUTO: 8.5 FL (ref 8.6–11.7)
POTASSIUM SERPL-SCNC: 3.7 MMOL/L (ref 3.5–5.5)
PROCALCITONIN SERPL-MCNC: <0.05 NG/ML
PROT SERPL-MCNC: 6.3 G/DL (ref 6.4–8.9)
QRS AXIS: 69 DEGREES
QRSD INTERVAL: 100 MS
QT INTERVAL: 312 MS
QTC INTERVAL: 453 MS
RBC # BLD AUTO: 4.01 MILLION/UL (ref 3.9–5.2)
S PNEUM AG UR QL: NEGATIVE
SODIUM SERPL-SCNC: 139 MMOL/L (ref 134–143)
T WAVE AXIS: 249 DEGREES
TSH SERPL DL<=0.05 MIU/L-ACNC: 1.17 UIU/ML (ref 0.36–3.74)
VENTRICULAR RATE: 127 BPM
WBC # BLD AUTO: 14.3 THOUSAND/UL (ref 4.8–10.8)

## 2019-08-27 PROCEDURE — 83615 LACTATE (LD) (LDH) ENZYME: CPT | Performed by: STUDENT IN AN ORGANIZED HEALTH CARE EDUCATION/TRAINING PROGRAM

## 2019-08-27 PROCEDURE — 99233 SBSQ HOSP IP/OBS HIGH 50: CPT | Performed by: HOSPITALIST

## 2019-08-27 PROCEDURE — 83735 ASSAY OF MAGNESIUM: CPT | Performed by: STUDENT IN AN ORGANIZED HEALTH CARE EDUCATION/TRAINING PROGRAM

## 2019-08-27 PROCEDURE — 99291 CRITICAL CARE FIRST HOUR: CPT | Performed by: STUDENT IN AN ORGANIZED HEALTH CARE EDUCATION/TRAINING PROGRAM

## 2019-08-27 PROCEDURE — 80053 COMPREHEN METABOLIC PANEL: CPT | Performed by: INTERNAL MEDICINE

## 2019-08-27 PROCEDURE — 99255 IP/OBS CONSLTJ NEW/EST HI 80: CPT | Performed by: INTERNAL MEDICINE

## 2019-08-27 PROCEDURE — 84443 ASSAY THYROID STIM HORMONE: CPT | Performed by: PHYSICIAN ASSISTANT

## 2019-08-27 PROCEDURE — NC001 PR NO CHARGE: Performed by: INTERNAL MEDICINE

## 2019-08-27 PROCEDURE — 93005 ELECTROCARDIOGRAM TRACING: CPT

## 2019-08-27 PROCEDURE — 84145 PROCALCITONIN (PCT): CPT | Performed by: INTERNAL MEDICINE

## 2019-08-27 PROCEDURE — 93010 ELECTROCARDIOGRAM REPORT: CPT | Performed by: INTERNAL MEDICINE

## 2019-08-27 PROCEDURE — 85025 COMPLETE CBC W/AUTO DIFF WBC: CPT | Performed by: INTERNAL MEDICINE

## 2019-08-27 RX ORDER — DOCUSATE SODIUM 100 MG/1
100 CAPSULE, LIQUID FILLED ORAL 2 TIMES DAILY
Status: DISCONTINUED | OUTPATIENT
Start: 2019-08-27 | End: 2019-08-29 | Stop reason: HOSPADM

## 2019-08-27 RX ORDER — DIPHENHYDRAMINE HCL 25 MG
25 TABLET ORAL
Status: DISCONTINUED | OUTPATIENT
Start: 2019-08-27 | End: 2019-08-29 | Stop reason: HOSPADM

## 2019-08-27 RX ORDER — METOPROLOL TARTRATE 50 MG/1
50 TABLET, FILM COATED ORAL EVERY 12 HOURS SCHEDULED
Status: DISCONTINUED | OUTPATIENT
Start: 2019-08-27 | End: 2019-08-28

## 2019-08-27 RX ORDER — METOPROLOL TARTRATE 5 MG/5ML
5 INJECTION INTRAVENOUS ONCE
Status: COMPLETED | OUTPATIENT
Start: 2019-08-27 | End: 2019-08-27

## 2019-08-27 RX ORDER — METOPROLOL TARTRATE 5 MG/5ML
5 INJECTION INTRAVENOUS EVERY 6 HOURS PRN
Status: DISCONTINUED | OUTPATIENT
Start: 2019-08-27 | End: 2019-08-29 | Stop reason: HOSPADM

## 2019-08-27 RX ORDER — LANOLIN ALCOHOL/MO/W.PET/CERES
3 CREAM (GRAM) TOPICAL
Status: DISCONTINUED | OUTPATIENT
Start: 2019-08-27 | End: 2019-08-29 | Stop reason: HOSPADM

## 2019-08-27 RX ORDER — FUROSEMIDE 10 MG/ML
40 INJECTION INTRAMUSCULAR; INTRAVENOUS
Status: DISCONTINUED | OUTPATIENT
Start: 2019-08-27 | End: 2019-08-28

## 2019-08-27 RX ORDER — DILTIAZEM HYDROCHLORIDE 60 MG/1
120 TABLET, FILM COATED ORAL EVERY 8 HOURS SCHEDULED
Status: DISCONTINUED | OUTPATIENT
Start: 2019-08-27 | End: 2019-08-29 | Stop reason: HOSPADM

## 2019-08-27 RX ADMIN — DILTIAZEM HYDROCHLORIDE 15 MG/HR: 5 INJECTION INTRAVENOUS at 01:57

## 2019-08-27 RX ADMIN — DILTIAZEM HYDROCHLORIDE 120 MG: 60 TABLET, FILM COATED ORAL at 14:18

## 2019-08-27 RX ADMIN — METOPROLOL TARTRATE 5 MG: 5 INJECTION, SOLUTION INTRAVENOUS at 01:15

## 2019-08-27 RX ADMIN — DILTIAZEM HYDROCHLORIDE 120 MG: 60 TABLET, FILM COATED ORAL at 21:01

## 2019-08-27 RX ADMIN — METOPROLOL TARTRATE 50 MG: 50 TABLET, FILM COATED ORAL at 21:01

## 2019-08-27 RX ADMIN — ATORVASTATIN CALCIUM 40 MG: 40 TABLET, FILM COATED ORAL at 16:27

## 2019-08-27 RX ADMIN — APIXABAN 5 MG: 5 TABLET, FILM COATED ORAL at 11:25

## 2019-08-27 RX ADMIN — DILTIAZEM HYDROCHLORIDE 120 MG: 60 TABLET, FILM COATED ORAL at 05:22

## 2019-08-27 RX ADMIN — FUROSEMIDE 40 MG: 10 INJECTION, SOLUTION INTRAMUSCULAR; INTRAVENOUS at 16:27

## 2019-08-27 RX ADMIN — ESCITALOPRAM OXALATE 20 MG: 10 TABLET ORAL at 09:10

## 2019-08-27 RX ADMIN — APIXABAN 5 MG: 5 TABLET, FILM COATED ORAL at 17:16

## 2019-08-27 RX ADMIN — ENOXAPARIN SODIUM 40 MG: 40 INJECTION SUBCUTANEOUS at 09:10

## 2019-08-27 RX ADMIN — METOPROLOL TARTRATE 50 MG: 50 TABLET, FILM COATED ORAL at 11:25

## 2019-08-27 RX ADMIN — MELATONIN 3 MG: at 21:01

## 2019-08-27 RX ADMIN — ASPIRIN 81 MG: 81 TABLET, COATED ORAL at 09:10

## 2019-08-27 RX ADMIN — FUROSEMIDE 40 MG: 10 INJECTION, SOLUTION INTRAMUSCULAR; INTRAVENOUS at 11:38

## 2019-08-27 RX ADMIN — ACETAMINOPHEN 650 MG: 325 TABLET ORAL at 07:41

## 2019-08-27 RX ADMIN — DOCUSATE SODIUM 100 MG: 100 CAPSULE, LIQUID FILLED ORAL at 17:16

## 2019-08-27 RX ADMIN — DILTIAZEM HYDROCHLORIDE 10 MG/HR: 5 INJECTION INTRAVENOUS at 20:56

## 2019-08-27 NOTE — UTILIZATION REVIEW
Initial Clinical Review    Admission: Date/Time/Statement: Inpatient Admission Orders (From admission, onward)     Ordered        08/26/19 1228  Inpatient Admission (expected length of stay for this patient Order details is greater than two midnights)  Once                   Orders Placed This Encounter   Procedures    Inpatient Admission (expected length of stay for this patient Order details is greater than two midnights)     Standing Status:   Standing     Number of Occurrences:   1     Order Specific Question:   Admitting Physician     Answer:   Jocelin Cotter [[id number]]     Order Specific Question:   Level of Care     Answer:   Critical Care [15]     Order Specific Question:   Estimated length of stay     Answer:   More than 2 Midnights     Order Specific Question:   Certification     Answer:   I certify that inpatient services are medically necessary for this patient for a duration of greater than two midnights  See H&P and MD Progress Notes for additional information about the patient's course of treatment  ED Arrival Information     Expected Arrival Acuity Means of Arrival Escorted By Service Admission Type    - 8/26/2019 08:58 Emergent Walk-In Family Member General Medicine Emergency    Arrival Complaint    shortness of breath        Chief Complaint   Patient presents with    Shortness of Breath     with exersion  onset 08/25/2019     Assessment/Plan: 79 y/o female presents to ED from home with SOB for the last few days, acutely worsening this morning  Worse with exertion  On exam, tachycardia, irregularly irregular rhythm  CTA chest showed multifocal pneumonia with B/L pleural effusions  Admitted as inpatient to Critical Care due to sepsis secondary to pneumonia, acute respiratory failure with hypoxia, new onset Afib with RVR  Continue IV antibiotics  Sputum cx  Continue supplemental O2  Pulmonary consult  IV Lasix x1  Continue Cardizem gtt  Trend troponins  Echo  Cardiology consult    8/27 IR consult for possible thoracentesis  Remains in Afib, currently maxed out on cardizem gtt  Received IV digoxin  PO cardizem started overnight  On exam, increased respiratory effort, accessory muscle use, decreased breath sounds  8/27 Cardiology consult:  Afib remains uncontrolled despite max cardizem gtt, po cardizem, multiple doses of IV digoxin and metoprolol  Start bid lopressor 50 mg  Start Eliquis  Start bid IV Lasix      ED Triage Vitals   Temperature Pulse Respirations Blood Pressure SpO2   08/26/19 0904 08/26/19 0904 08/26/19 0904 08/26/19 0904 08/26/19 0904   98 5 °F (36 9 °C) (!) 180 20 142/97 (!) 89 %      Temp Source Heart Rate Source Patient Position - Orthostatic VS BP Location FiO2 (%)   08/26/19 1245 08/26/19 0904 08/26/19 0945 08/26/19 0904 --   Temporal Monitor Sitting Left arm       Pain Score       08/26/19 0904       No Pain        Wt Readings from Last 1 Encounters:   08/27/19 131 kg (289 lb)     Additional Vital Signs:   08/27/19 1300  123Abnormal  28Abnormal  127/72 90 % Nasal cannula 6L   08/27/19 1200  122Abnormal  32Abnormal  134/79 92 % Nasal cannula 6L   08/27/19 1100 99 7 °F (37 6 °C) 136Abnormal  27Abnormal  150/72 90 % Nasal cannula 6L   08/27/19 1000  140Abnormal  25Abnormal  149/67 90 % Nasal cannula 6L   08/27/19 0900  144Abnormal  27Abnormal  139/65 91 % Nasal cannula 6L   08/27/19 0800  136Abnormal  27Abnormal  140/72 91 % Nasal cannula 6L   08/27/19 0700 100 7 °F (38 2 °C) 132Abnormal  26Abnormal  138/74 90 % Nasal cannula 6L   08/27/19 0500  148Abnormal  29Abnormal  138/83 92 %    08/27/19 0400 98 4 °F (36 9 °C) 141Abnormal  25Abnormal  132/73 88 %Abnormal     08/27/19 0300  148Abnormal  24Abnormal  139/92 91 %    08/27/19 0200  129Abnormal  23Abnormal  130/70 89 %Abnormal     08/27/19 0100  144Abnormal  24Abnormal  141/108Abnormal  90 %    08/27/19 0000 98 4 °F (36 9 °C) 143Abnormal  21 122/77 91 %    08/26/19 2300  136Abnormal  23Abnormal  131/72 88 %Abnormal     08/26/19 2200  140Abnormal  23Abnormal  117/80 89 %Abnormal     08/26/19 2100  140Abnormal  23Abnormal  133/69 92 %    08/26/19 2000 98 4 °F (36 9 °C) 142Abnormal  29Abnormal  115/70 92 % Nasal cannula 6L   08/26/19 1900  145Abnormal  25Abnormal  109/63 91 %    08/26/19 1830  130Abnormal  22 112/80 92 % Nasal cannula 6L   08/26/19 1800  129Abnormal  21 117/60 92 % Nasal cannula 6L   08/26/19 1730  136Abnormal  27Abnormal  115/56 90 % Nasal cannula 6L   08/26/19 1700  128Abnormal  22 126/63 91 % Nasal cannula 6L   08/26/19 1630  135Abnormal  21 119/59 91 % 6L   08/26/19 1600  125Abnormal  22 125/69 89 %Abnormal  Nasal cannula 6L   08/26/19 1530  148Abnormal  27Abnormal  121/72 86 %Abnormal  Nasal cannula 6L   08/26/19 1500  143Abnormal  27Abnormal  116/89 90 % Nasal cannula   08/26/19 1430  141Abnormal  31Abnormal  122/67 85 %Abnormal  Nasal cannula 6L   08/26/19 1400 98 3 °F (36 8 °C) 145Abnormal  28Abnormal  102/79 90 % Nasal cannula 6L   08/26/19 1345  142Abnormal  36Abnormal  102/60 92 % Nasal cannula 6L   08/26/19 1330  147Abnormal  32Abnormal  122/78 87 %Abnormal  Nasal cannula 6L   08/26/19 1320 98 6 °F (37 °C) 130Abnormal  26Abnormal  120/78 90 % Nasal cannula 4L   08/26/19 1308  145Abnormal  29Abnormal   90 %    08/26/19 1245 98 5 °F (36 9 °C) 139Abnormal  18  91 % Nasal cannula 3L       Pertinent Labs/Diagnostic Test Results:   Results from last 7 days   Lab Units 08/27/19  0503 08/26/19  0918   WBC Thousand/uL 14 30* 18 40*   HEMOGLOBIN g/dL 12 1 13 5   HEMATOCRIT % 36 2* 40 7*   PLATELETS Thousands/uL 237 263   NEUTROS ABS Thousands/µL 11 70* 15 70*     Results from last 7 days   Lab Units 08/27/19  0503 08/26/19  0918   SODIUM mmol/L 139 136   POTASSIUM mmol/L 3 7 3 6   CHLORIDE mmol/L 108* 101   CO2 mmol/L 22 24   ANION GAP mmol/L 9 11   BUN mg/dL 21 20   CREATININE mg/dL 0 75 0 83   EGFR ml/min/1 73sq m 81 72   CALCIUM mg/dL 8 7 9 4   MAGNESIUM mg/dL 2 2  -- Results from last 7 days   Lab Units 08/27/19  0503 08/26/19  0918   AST U/L 18 13   ALT U/L 22 18   ALK PHOS U/L 116 98   TOTAL PROTEIN g/dL 6 3* 7 2   ALBUMIN g/dL 3 7 4 2   TOTAL BILIRUBIN mg/dL 1 10* 1 50*     Results from last 7 days   Lab Units 08/27/19  0503 08/26/19  0918   GLUCOSE RANDOM mg/dL 116* 173*     Results from last 7 days   Lab Units 08/26/19  1729 08/26/19  1239 08/26/19  0918   TROPONIN I ng/mL <0 03 <0 03 <0 03     Results from last 7 days   Lab Units 08/26/19  0918   D DIMER QUANT ng/ml (FEU) 402*     Results from last 7 days   Lab Units 08/26/19  0918   PROTIME seconds 14 6*   INR  1 26   PTT seconds 34     Results from last 7 days   Lab Units 08/27/19  0503   TSH 3RD GENERATON uIU/mL 1 170     Results from last 7 days   Lab Units 08/27/19  0503   PROCALCITONIN ng/ml <0 05     Results from last 7 days   Lab Units 08/26/19  1239   LACTIC ACID mmol/L 1 3     Results from last 7 days   Lab Units 08/26/19  0918   BNP pg/mL 499*     Results from last 7 days   Lab Units 08/26/19  1112   CLARITY UA  Cloudy*   COLOR UA  Yellow   SPEC GRAV UA  1 020   PH UA  5 5   GLUCOSE UA mg/dl Negative   KETONES UA mg/dl Negative   BLOOD UA  Trace-Intact*   PROTEIN UA mg/dl Trace*   NITRITE UA  Positive*   BILIRUBIN UA  Negative   UROBILINOGEN UA E U /dl 0 2   LEUKOCYTES UA  Trace*   WBC UA /hpf 4-10*   RBC UA /hpf 2-4*   BACTERIA UA /hpf Moderate*   EPITHELIAL CELLS WET PREP /hpf Moderate*     Results from last 7 days   Lab Units 08/26/19  1526 08/26/19  1112   STREP PNEUMONIAE ANTIGEN, URINE  Negative  --    LEGIONELLA URINARY ANTIGEN  Negative Negative     8/26 EKG:  Atrial fibrillation with rapid ventricular response; 188 bpm  Marked ST abnormality, possible inferior subendocardial injury    8/26 CXR:  Bibasilar opacities could be pneumonia or bibasilar edema  Suggestion of trace amount of pleural fluid as well  Cardiomegaly noted      8/26 CTA chest PE study:  Slightly limited study due to respiratory motion artifact   No definite evidence of pulmonary embolism  Bilateral multifocal consolidation compatible with multilobar pneumonia   Small bilateral pleural effusions, presumably parapneumonic in nature  Mild cardiomegaly  Cholelithiasis  8/26 Echo:  LEFT VENTRICLE:  Systolic function was normal  Ejection fraction was estimated to be 60 %  There were no regional wall motion abnormalities  RIGHT VENTRICLE:  Wall thickness was mildly increased  MITRAL VALVE:  There was mild regurgitation      ED Treatment:   Medication Administration from 08/26/2019 0858 to 08/26/2019 1321       Date/Time Order Dose Route Action     08/26/2019 0917 diltiazem (CARDIZEM) injection 15 mg 15 mg Intravenous Given     08/26/2019 0939 diltiazem (CARDIZEM) 125 mg in sodium chloride 0 9 % 125 mL infusion 15 mg/hr Intravenous New Bag     08/26/2019 0920 sodium chloride 0 9 % bolus 1,000 mL 1,000 mL Intravenous New Bag     08/26/2019 0936 diltiazem (CARDIZEM) injection 10 mg 10 mg Intravenous Given     08/26/2019 1015 metoprolol (LOPRESSOR) injection 5 mg 5 mg Intravenous Given     08/26/2019 1024 metoprolol (LOPRESSOR) injection 5 mg 5 mg Intravenous Given     08/26/2019 1051 sodium chloride 0 9 % bolus 1,000 mL 1,000 mL Intravenous New Bag     08/26/2019 1120 metoprolol (LOPRESSOR) injection 5 mg 5 mg Intravenous Given     08/26/2019 1239 azithromycin (ZITHROMAX) 500 mg in sodium chloride 0 9 % 250 mL IVPB 500 mg Intravenous New Bag     08/26/2019 1236 sodium chloride 0 9 % bolus 1,000 mL 1,000 mL Intravenous New Bag        Past Medical History:   Diagnosis Date    Anxiety     Hyperlipidemia     Hypertension     Obesity      Present on Admission:  **None**      Admitting Diagnosis: Cardiomegaly [I51 7]  Acute cystitis [N30 00]  Shortness of breath [R06 02]  Cholelithiasis [K80 20]  Hypertension [I10]  Hypoxia [R09 02]  Bilateral pneumonia [J18 9]  Pleural effusion, bilateral [J90]  Atrial fibrillation with RVR ([facility] [address] ) [I48 91]  Sepsis ([facility] [address] ) [A41 9]  Age/Sex: 78 y o  female  Admission Orders:    Current Facility-Administered Medications:  acetaminophen 650 mg Oral Q6H PRN x1   apixaban 5 mg Oral BID   atorvastatin 40 mg Oral Daily With Dinner   diltiazem (CARDIZEM) 125 mg in sodium chloride 0 9% 125 mL infusion 1-15 mg/hr Intravenous Titrated   diltiazem 120 mg Oral Q8H [address]   docusate sodium 100 mg Oral BID   escitalopram 20 mg Oral Daily   furosemide 40 mg Intravenous BID (diuretic)   metoprolol 5 mg Intravenous Q6H PRN   metoprolol tartrate 50 mg Oral Q12H FRANCISCO   ondansetron 4 mg Intravenous Q6H PRN       IP CONSULT TO CARDIOLOGY  IP CONSULT TO PULMONOLOGY  IP CONSULT TO MEDICAL CRITICAL CARE   SCDs  VS  IR consult  PT/OT    Network Utilization Review Department  Phone: [phone]; Fax [phone]  [email or web address]  ATTENTION: Please call with any questions or concerns to [phone]  and carefully listen to the prompts so that you are directed to the right person  Send all requests for admission clinical reviews, approved or denied determinations and any other requests to fax [phone]   All voicemails are confidential

## 2019-08-27 NOTE — NURSING NOTE
Patient AAOx4,cardiac monitor showing uncontrolled Atrial fibrillation with RVR  On cardizem drip @ 15 mg/hr  Abiquiu Files RN notified x2,Digoxin IV given early in the shift (see MAR) Lopressor 5 mg IVP given  And Cardizem 120 mg given po as ordered this AM (see MAR for timing)  NO complaints offered

## 2019-08-27 NOTE — NURSING NOTE
DR Blake Weathers present, spoke with cardiology  He will hold on doing thoracentesis at this time  Cardiology is going to order IV lasix  Ok to give Eliquis now per Dr Blake Weathers since not doing thoracentesis

## 2019-08-27 NOTE — OCCUPATIONAL THERAPY NOTE
Occupational Therapy Cancellation Note     OT order received and chart review performed  At this time, OT evaluation cancelled due to patient not medically appropriate for therapy services secondary to HR being unstable  ANGELITA Chow verbalized agreement with cancellation  OT will follow and evaluate as appropriate              Stanley Marmolejo MS, OTR/L

## 2019-08-27 NOTE — ASSESSMENT & PLAN NOTE
· This is new onset atrial fibrillation  · Patient is currently maxed out on a Cardizem drip, she is also status post digoxin therapy and oral Cardizem was started overnight  · 2D echocardiogram for the most part is within normal limits, trace mitral regurgitation was noted  · Await formal cardiology input for further medication optimization  · Defer anticoagulation initiation to Cardiology  · Continue other current cardiac based medications which include aspirin, and atorvastatin

## 2019-08-27 NOTE — PHYSICAL THERAPY NOTE
Physical Therapy Cancellation Note    PT order received  Chart review performed  At this time, PT evaluation cancelled as pt is not medically appropriate for therapy services, HR remains uncontrolled  PT will follow and evaluate as appropriate      Eber Rowley PT

## 2019-08-27 NOTE — SOCIAL WORK
Chart reviewed by case management, pt remains in ICU on a drip and unable to assess at this time, cm will continue ot follow, no d/c for today as discussed at care coordination rounds today,

## 2019-08-27 NOTE — ASSESSMENT & PLAN NOTE
· Most likely secondary to the bilateral pleural effusions, BNP was mildly elevated, however the echocardiogram was normal therefore doubt clinically that CHF is the primary underlying cause  · Will monitor oxygen requirements especially more so after the thoracentesis, hold off on further Lasix for now

## 2019-08-27 NOTE — PROGRESS NOTES
Pulmonary Note  Pulmonary service currently not available in-hospital today  I have reviewed the patient's case and consultation  I discussed with Dr Ashley Walter consultant, Ms Bravo's findings and care plan  I agree with current plan per Dr Stone Harper from a pulmonary standpoint  Will plan in-house evaluation Thursday 8/29  Please feel free to contact me for any questions or concerns in the interim       Geraldine Forget, DO

## 2019-08-27 NOTE — ASSESSMENT & PLAN NOTE
· CTA chest with multifocal pneumonia - however patient's clinical history is not completely convincing for a pneumonia  · Will continue IV antibiotics with ceftriaxone/Zithromax for now, white blood cell count has improved (patient has a remote history of penicillin allergy, however is tolerating the ceftriaxone well)  · Urine for Streptococcus pneumoniae and Legionella antigen is negative  · Blood cultures negative thus far  · In view of the bilateral pleural effusions, will consult interventional Radiology for the evaluation of a possible diagnostic and therapeutic thoracentesis  Pleural fluid labs have been ordered    · Continue management here in the ICU

## 2019-08-27 NOTE — CONSULTS
Consult- Ivon Rg 1950, 78 y o  female MRN: [id number]    Unit/Bed#: ICU 03 Encounter: [id number]    Primary Care Provider: Aida Pineda MD   Date and time admitted to hospital: 8/26/2019  9:27 AM      Inpatient consult to Cardiology  Consult performed by: Irineo Mcghee PA-C  Consult ordered by: Joe Mariano MD        Assessment/Plan:   1  Sepsis secondary to pneumonia   · Bilateral multifocal consolidations on CT   · Leukocytosis improving   · Was febrile (100 7F) this morning     2  New onset afib with RVR   · Rate remains uncontrolled despite max cardizem drip, oral cardizem and multiple doses of IV digoxin and metoprolol  · Will add Lopressor 50mg PO BID  · [id number] score 3 (age, female, HTN) - pt is agreeable to starting Eliquis  She denies recent falls, history of major bleeding, need for blood transfusions   · Echo with normal LV function, normal sized left atrium  · TSH normal    3  HTN  · Outpatient Lotrel held   · Continue cardizem drip 15mg/h and 120mg PO q8h, start Lopressor 50mg PO BID    4  HLD  · Continue atorvastatin    5  Small bilateral pleural effusions   · Symptoms somewhat improved following one dose of lasix yesterday  · Start lasix 40mg IV BID      HPI: Ivon Rg is a 78 y o  female w/ HTN and HLD who presents with progressive dyspnea on exertion in the last week  She also reports a dry cough for the last month  She initially attributed the cough to be a side-effect from ACE-I therapy, as this was a new medication for her  Last week she began having to stop and rest while climbing the stairs at work  Yesterday she was only able to walk up 2-3 steps before having to rest  In the ED she was noted to be in new onset afib with RVR and CT chest revealed multifocal pneumonia  She met sepsis criteria with leukocytosis, tachycardia and tachypnea  She was febrile this morning at 100 7F   She was started on a cardizem drip 15mg/h and received multiple doses of IV digoxin and metoprolol  This morning cardizem 120mg q8h was added  She remains in afib with rates 120-150s  She denies palpitations, chest pain, N/V, diaphoresis, lightheadedness, near syncope and syncope  She is currently resting comfortably and denies any complaints  She is only symptomatic with exertion  She reports an episode in April while in [place] of possible near-syncope/syncope  She underwent neurologic workup which was negative and the episode was thought to be related to volume depletion  SBP was also >200 at that time and she was started on Lotrel by her PCP upon returning home  She has no known cardiac history  Family history: No known CAD    Social history: never smoker, rare social alcohol use    EKG: Afib with RVR, rate 188  Inferior ST depression   Telemetry: Afib with RVR, rates 120-150s    Most recent cardiac testing:    TTE 8/26/19 - Normal LV systolic function,  EF 14%  Mild MR  Review of Systems:   Review of Systems   Constitution: Negative  HENT: Negative  Eyes: Negative  Cardiovascular: Positive for dyspnea on exertion  Negative for chest pain, claudication, irregular heartbeat, leg swelling, near-syncope, orthopnea, palpitations, paroxysmal nocturnal dyspnea and syncope  Respiratory: Positive for cough (dry cough x1 month)  Negative for shortness of breath and wheezing  Endocrine: Negative  Skin: Negative  Musculoskeletal: Negative  Gastrointestinal: Negative  Genitourinary: Negative  Neurological: Negative for dizziness and light-headedness  Psychiatric/Behavioral: Negative          Historical Information   Past Medical History:   Diagnosis Date    Anxiety     Hyperlipidemia     Hypertension     Obesity      Past Surgical History:   Procedure Laterality Date    TONSILLECTOMY       Social History     Substance and Sexual Activity   Alcohol Use Yes    Comment: social     Social History     Substance and Sexual Activity   Drug Use Never     Social History Tobacco Use   Smoking Status Never Smoker   Smokeless Tobacco Never Used       Family History:   History reviewed  No pertinent family history  Meds/Allergies   all current active meds have been reviewed  Medications Prior to Admission   Medication    amLODIPine-benazepril (LOTREL) 10-40 MG per capsule    ASPIRIN 81 PO    atorvastatin (LIPITOR) 40 mg tablet    escitalopram (LEXAPRO) 20 mg tablet       Allergies   Allergen Reactions    Penicillins Rash       Objective   Vitals: Blood pressure 149/67, pulse (!) 140, temperature (!) 100 7 °F (38 2 °C), temperature source Tympanic, resp  rate (!) 25, height 5' 1" (1 549 m), weight 131 kg (289 lb), SpO2 90 %  , Body mass index is 54 61 kg/m² ,   Orthostatic Blood Pressures      Most Recent Value   Blood Pressure  149/67 filed at 08/27/2019 1000   Patient Position - Orthostatic VS  Lying filed at 08/27/2019 1000          Physical Exam   Physical Exam   Constitutional: She is oriented to person, place, and time  She appears well-developed and well-nourished  No distress  HENT:   Head: Normocephalic and atraumatic  Eyes: EOM are normal  No scleral icterus  Neck: Normal range of motion  Neck supple  Cardiovascular: S1 normal and S2 normal  An irregularly irregular rhythm present  Tachycardia present  No murmur heard  Pulmonary/Chest: Effort normal  She has decreased breath sounds (at the bases)  She has no wheezes  She has no rales  Abdominal: Soft  Bowel sounds are normal    Musculoskeletal: She exhibits no edema  Neurological: She is alert and oriented to person, place, and time  Skin: Skin is warm and dry  Psychiatric: She has a normal mood and affect  Her behavior is normal    Nursing note and vitals reviewed      Lab Results:     Troponins:   Results from last 7 days   Lab Units 08/26/19  1729 08/26/19  1239 08/26/19  0918   TROPONIN I ng/mL <0 03 <0 03 <0 03     BNP:  Results from last 6 Months   Lab Units 08/26/19  0918   BNP pg/mL 499* CBC with diff:   Results from last 7 days   Lab Units 08/27/19  0503 08/26/19  0918   WBC Thousand/uL 14 30* 18 40*   HEMOGLOBIN g/dL 12 1 13 5   HEMATOCRIT % 36 2* 40 7*   PLATELETS Thousands/uL 237 263   RBC Million/uL 4 01 4 48     CMP:  Results from last 7 days   Lab Units 08/27/19  0503 08/26/19  0918   POTASSIUM mmol/L 3 7 3 6   CHLORIDE mmol/L 108* 101   CO2 mmol/L 22 24   BUN mg/dL 21 20   CREATININE mg/dL 0 75 0 83   CALCIUM mg/dL 8 7 9 4   AST U/L 18 13   ALT U/L 22 18   ALK PHOS U/L 116 98   EGFR ml/min/1 73sq m 81 72     TSH:     Coags:   Results from last 7 days   Lab Units 08/26/19  0918   INR  1 26

## 2019-08-27 NOTE — ASSESSMENT & PLAN NOTE
· BMI of 54 61  · Dietary, weight loss, and lifestyle modification counseling provided  ·  Supportive care  · Nutrition eval

## 2019-08-27 NOTE — NURSING NOTE
Patient resting comfortably in bed at this time  Respirations nonlabored at rest on O2 6L/min via nasal cannula, SaO2 91% on same  Remains in afib on monitor   Have been trying to wean down cardizem drip as cardiology requested  Currently at 10mg/hr  Patient denies any chest pain or discomfort  Visitors present  Patient verbalizing needs

## 2019-08-27 NOTE — CONSULTS
[address] [facility] 78 y o  female MRN: [id number]  Unit/Bed#: ICU 03 Encounter: [id number]    Physician Requesting Consult: Enma Thakkar MD    Reason for Consultation / Chief Complaint: Acute hypoxic respiratory insufficiency    History of Present Illness:  Marialuisa Caputo is a 78 y o  female with PMH of HTN who presents with one week of worsening dyspnea on exertion to the point when she had to stop to catch her breath on stairs prompting her to come to the hospital  In ED noted to be hypoxic and requiring supplemental O2 as well as in atrial fibrillation with rapid ventricular response  Denies fevers, chills, sick contacts, aches, or malaise at home  Dry cough but no sputum production, no sore throat, no sinus pressure or congestion  Only recent travel was to [place] to visit her daughter 4/2019  No unusual pets or hobbies  Does endorse progressive GLEASON with increased leg swelling and weight gain  Denies chest pain  History obtained from chart review and the patient  Past Medical History:  Past Medical History:   Diagnosis Date    Anxiety     Hyperlipidemia     Hypertension     Obesity        Past Surgical History:  Past Surgical History:   Procedure Laterality Date    TONSILLECTOMY         Past Family History:  History reviewed  No pertinent family history  Social History:  Social History     Tobacco Use   Smoking Status Never Smoker   Smokeless Tobacco Never Used     Social History     Substance and Sexual Activity   Alcohol Use Yes    Comment: social     Social History     Substance and Sexual Activity   Drug Use Never     Marital Status:     Home Medications:   Prior to Admission medications    Medication Sig Start Date End Date Taking?  Authorizing Provider   amLODIPine-benazepril (LOTREL) 10-40 MG per capsule Daily   Yes Historical Provider, MD   ASPIRIN 81 PO Daily 4/5/19  Yes Historical Provider, MD   atorvastatin (LIPITOR) 40 mg tablet atorvastatin 40 mg tablet   take one tab by mouth once daily   Yes Historical Provider, MD   escitalopram (LEXAPRO) 20 mg tablet Daily   Yes Historical Provider, MD       Inpatient Medications:  Scheduled Meds:  Current Facility-Administered Medications:  acetaminophen 650 mg Oral Q6H PRN Franc Trejo MD    aspirin 81 mg Oral Daily Franc Trejo MD    atorvastatin 40 mg Oral Daily With Selwyn Rosenberg MD    diltiazem (CARDIZEM) 125 mg in sodium chloride 0 9% 125 mL infusion 1-15 mg/hr Intravenous Titrated Franc Trejo MD Last Rate: 15 mg/hr (08/27/19 1015)   diltiazem 120 mg Oral Q8H [facility]    enoxaparin 40 mg Subcutaneous Daily Franc Trejo MD    escitalopram 20 mg Oral Daily Franc Trejo MD    ondansetron 4 mg Intravenous Q6H PRN Franc Trejo MD      Continuous Infusions:  diltiazem (CARDIZEM) 125 mg in sodium chloride 0 9% 125 mL infusion 1-15 mg/hr Last Rate: 15 mg/hr (08/27/19 1015)     PRN Meds:    acetaminophen 650 mg Q6H PRN   ondansetron 4 mg Q6H PRN       Allergies: Allergies   Allergen Reactions    Penicillins Rash       ROS:   Review of Systems   Constitutional: Positive for unexpected weight change (weight gain)  Negative for appetite change, chills, diaphoresis, fatigue and fever  HENT: Negative for congestion, postnasal drip, rhinorrhea, sinus pressure and sore throat  Eyes: Negative  Respiratory: Positive for cough and shortness of breath  Negative for chest tightness and wheezing  Cardiovascular: Positive for leg swelling  Negative for chest pain and palpitations  Gastrointestinal: Negative  Endocrine: Negative  Genitourinary: Negative  Musculoskeletal: Negative  Skin: Negative  Allergic/Immunologic: Negative  Hematological: Negative  Psychiatric/Behavioral: Negative          Vitals:  Vitals:    08/27/19 0500 08/27/19 0555 08/27/19 0700 08/27/19 0800   BP: 138/83  138/74 140/72   BP Location: Left arm  Left arm Left arm   Pulse: (!) 148  (!) 132 (!) 136   Resp: (!) 29  (!) 26 (!) 27   Temp:   (!) 100 7 °F (38 2 °C)    TempSrc:   Tympanic    SpO2: 92%  90% 91%   Weight:  131 kg (289 lb)     Height:         Temperature:   Temp (24hrs), Av 8 °F (37 1 °C), Min:98 3 °F (36 8 °C), Max:100 7 °F (38 2 °C)    Current Temperature: (!) 100 7 °F (38 2 °C)    Weights:   IBW: 47 8 kg  Body mass index is 54 61 kg/m²  Hemodynamic Monitoring:  N/A     Non-Invasive/Invasive Ventilation Settings:  Respiratory    Lab Data (Last 4 hours)    None         O2/Vent Data (Last 4 hours)    None              No results found for: PHART, RXC5NZY, PO2ART, ZOH7DWE, H5ASXZLM, BEART, SOURCE  SpO2 Device: O2 Device: Nasal cannula     Physical Exam:  Physical Exam   Constitutional: She is oriented to person, place, and time  She appears well-developed and well-nourished  No distress  HENT:   Head: Normocephalic and atraumatic  Eyes: Pupils are equal, round, and reactive to light  Cardiovascular:   Irregularly irregular rhythm, rapid rate  Palpable radial pulses bilaterally   Pulmonary/Chest: No respiratory distress  Tachypnea, no use of accessory muscles  Able to speak in full sentences  +rales   Abdominal: Soft  There is no tenderness  Genitourinary:   Genitourinary Comments: Clear yellow urine in murcia   Musculoskeletal: She exhibits edema  Neurological: She is alert and oriented to person, place, and time  Skin: Skin is warm and dry  Psychiatric: She has a normal mood and affect  Nursing note and vitals reviewed        Labs:  Results from last 7 days   Lab Units 19  0503 19  0918   WBC Thousand/uL 14 30* 18 40*   HEMOGLOBIN g/dL 12 1 13 5   HEMATOCRIT % 36 2* 40 7*   PLATELETS Thousands/uL 237 263   NEUTROS PCT % 82* 85*   MONOS PCT % 10 9     Results from last 7 days   Lab Units 19  0503 19  0918   SODIUM mmol/L 139 136   POTASSIUM mmol/L 3 7 3 6   CHLORIDE mmol/L 108* 101   CO2 mmol/L 22 24   ANION GAP mmol/L 9 11   BUN mg/dL 21 20 CREATININE mg/dL 0 75 0 83   CALCIUM mg/dL 8 7 9 4   GLUCOSE RANDOM mg/dL 116* 173*   ALT U/L 22 18   AST U/L 18 13   ALK PHOS U/L 116 98   ALBUMIN g/dL 3 7 4 2   TOTAL BILIRUBIN mg/dL 1 10* 1 50*     Results from last 7 days   Lab Units 08/27/19  0503   MAGNESIUM mg/dL 2 2      Results from last 7 days   Lab Units 08/26/19  0918   INR  1 26   PTT seconds 34      Results from last 7 days   Lab Units 08/26/19  1729 08/26/19  1239 08/26/19  0918   TROPONIN I ng/mL <0 03 <0 03 <0 03     Results from last 7 days   Lab Units 08/26/19  1239   LACTIC ACID mmol/L 1 3     ABG:    VBG:    Results from last 7 days   Lab Units 08/27/19  0503   PROCALCITONIN ng/ml <0 05       Imaging:   CTA ED chest PE Study   Final Result      Slightly limited study due to respiratory motion artifact  No definite evidence of pulmonary embolism  Bilateral multifocal consolidation compatible with multilobar pneumonia  Small bilateral pleural effusions, presumably parapneumonic in nature  Mild cardiomegaly  Cholelithiasis  Workstation performed: [id number]         XR chest 1 view portable   Final Result      Bibasilar opacities could be pneumonia or bibasilar edema  Suggestion of trace amount of pleural fluid as well  Cardiomegaly noted  Workstation performed: [id number]         IR consult    (Results Pending)      I have personally reviewed pertinent reports  and I have personally reviewed pertinent films in PACS  EKG: Afib with RVR  This was personally reviewed by myself     Micro:  Results from last 7 days   Lab Units 08/26/19  1526 08/26/19  1112   LEGIONELLA URINARY ANTIGEN  Negative Negative   STREP PNEUMONIAE ANTIGEN, URINE  Negative  --        ______________________________________________________________________    Assessment and Plan:   Principal Problem:    Sepsis due to pneumonia [facility])  Active Problems:    Acute respiratory failure with hypoxia (HCC)    Atrial fibrillation with rapid ventricular response ([facility] )    Essential hypertension    Morbid obesity (HCC)    Major depressive disorder in full remission ([facility] )  Resolved Problems:    * No resolved hospital problems  *      Neuro:   No active issues, delirium precautions  Did have CT/MRI brain earlier this year during work-up of syncope which were reportedly normal     CV:   Atrial fibrillation with RVR:  Rapid rate persistens despite IV cardizem gtt as well as multiple cardizem boluses and metoprolol boluses  Appreciate cardiology recommendations  To start PO metoprolol in addition to PO cardizem as well as eliquis for anticoagulation  Continue diuresis  Troponin negative x3  No RWMA  Systolic function estimated at 60% on TTE 8/26  TSH within normal limits  Lung:   Acute hypoxic respiratory failure:  Requiring 6L nc to maintain saturations in low 90s  Per history and physical exam this seems less likely infectious and more likely cardiac in origin  Agree with ongoing diuresis as per cardiology  However, low grade fever of 38 2 and leukocytosis that is improving with abx may be consistent with pneumonia, see below  CTA negative for acute PE  Normal renal function makes possible vasculitis less likely  Will send ESR and CRP  Possible community acquired pneumonia:  Patient with no preceding infectious prodrome and denies fevers/chills/cough at home  No sick contacts  However, did have leukocytosis, low grade fever, tachypnea, and oxygen requirement on admission  Started on azithromycin and ceftriaxone  Initial procalcitonin negative  Will repeat procalcitonin in AM and continue to trend WBC and fever curve  Pleural effusion:  May be related to new onset Afib with diastolic dysfunction versus infectious and contributing to shortness of breath and oxygen requirement  GI:   No active issues  F/E/N:   Electrolytes within acceptable limits  Will recheck this evening given plans for ongoing diuresis    Replacement as indicated  :  No active issues, will continue to monitor renal function closely in setting of diuresis  ID:   Possible community acquired pneumonia:  History not consistent with pneumonia, but patient did present with leukocytosis, tachypnea, and oxygen requirement and developed low grade fever overnight  Agree with empiric treatment with ceftriaxone and azithromycin  Initial procalcitonin negative, will repeat and likely d/c abx if remains negative  Possible diagnostic/therapeutic thoracentesis today to be sent for futher studies/culture  Heme:   No active issues  To start systemic AC with eliquis  Endo:   No history of diabetes, glucose has been <180 since admission  Msk/Skin:   Continue meticulous skin care  Disposition: Continue ICU care    Counseling / Coordination of Care  Total Critical Care time spent 31 minutes excluding procedures, teaching and family updates  ______________________________________________________________________    VTE Pharmacologic Prophylaxis: to start systemic anticoagulation with eliquis  VTE Mechanical Prophylaxis: sequential compression device    Invasive lines and devices: Invasive Devices     Peripheral Intravenous Line            Peripheral IV 08/26/19 Left Forearm 1 day    Peripheral IV 08/26/19 Right Hand less than 1 day    Peripheral IV 08/26/19 Right Wrist less than 1 day          Drain            Urethral Catheter Latex 16 Fr  less than 1 day                Code Status: Level 1 - Full Code  POA:    POLST:      Given critical illness, patient length of stay will require greater than two midnights  Portions of the record may have been created with voice recognition software  Occasional wrong word or "sound a like" substitutions may have occurred due to the inherent limitations of voice recognition software  Read the chart carefully and recognize, using context, where substitutions have occurred        Lester Colby MD    Consults

## 2019-08-28 LAB
ANION GAP SERPL CALCULATED.3IONS-SCNC: 8 MMOL/L (ref 4–13)
BASOPHILS # BLD AUTO: 0 THOUSANDS/ΜL (ref 0–0.1)
BASOPHILS NFR BLD AUTO: 0 % (ref 0–2)
BUN SERPL-MCNC: 25 MG/DL (ref 7–25)
CALCIUM SERPL-MCNC: 8.7 MG/DL (ref 8.6–10.5)
CHLORIDE SERPL-SCNC: 104 MMOL/L (ref 98–107)
CO2 SERPL-SCNC: 27 MMOL/L (ref 21–31)
CREAT SERPL-MCNC: 0.85 MG/DL (ref 0.6–1.2)
CRP SERPL QL: 64.9 MG/L
EOSINOPHIL # BLD AUTO: 0.1 THOUSAND/ΜL (ref 0–0.61)
EOSINOPHIL NFR BLD AUTO: 1 % (ref 0–5)
ERYTHROCYTE [DISTWIDTH] IN BLOOD BY AUTOMATED COUNT: 13.5 % (ref 11.5–14.5)
ERYTHROCYTE [SEDIMENTATION RATE] IN BLOOD: 52 MM/HOUR (ref 0–30)
GFR SERPL CREATININE-BSD FRML MDRD: 70 ML/MIN/1.73SQ M
GLUCOSE SERPL-MCNC: 132 MG/DL (ref 65–99)
HCT VFR BLD AUTO: 34.3 % (ref 42–47)
HGB BLD-MCNC: 11.6 G/DL (ref 12–16)
LYMPHOCYTES # BLD AUTO: 1 THOUSANDS/ΜL (ref 0.6–4.47)
LYMPHOCYTES NFR BLD AUTO: 10 % (ref 21–51)
MCH RBC QN AUTO: 30.5 PG (ref 26–34)
MCHC RBC AUTO-ENTMCNC: 33.8 G/DL (ref 31–37)
MCV RBC AUTO: 90 FL (ref 81–99)
MONOCYTES # BLD AUTO: 1.1 THOUSAND/ΜL (ref 0.17–1.22)
MONOCYTES NFR BLD AUTO: 11 % (ref 2–12)
NEUTROPHILS # BLD AUTO: 8.3 THOUSANDS/ΜL (ref 1.4–6.5)
NEUTS SEG NFR BLD AUTO: 79 % (ref 42–75)
PLATELET # BLD AUTO: 259 THOUSANDS/UL (ref 149–390)
PMV BLD AUTO: 8.3 FL (ref 8.6–11.7)
POTASSIUM SERPL-SCNC: 3.6 MMOL/L (ref 3.5–5.5)
PROCALCITONIN SERPL-MCNC: <0.05 NG/ML
RBC # BLD AUTO: 3.8 MILLION/UL (ref 3.9–5.2)
SODIUM SERPL-SCNC: 139 MMOL/L (ref 134–143)
WBC # BLD AUTO: 10.6 THOUSAND/UL (ref 4.8–10.8)

## 2019-08-28 PROCEDURE — G8979 MOBILITY GOAL STATUS: HCPCS

## 2019-08-28 PROCEDURE — G8987 SELF CARE CURRENT STATUS: HCPCS

## 2019-08-28 PROCEDURE — G8988 SELF CARE GOAL STATUS: HCPCS

## 2019-08-28 PROCEDURE — 99233 SBSQ HOSP IP/OBS HIGH 50: CPT | Performed by: INTERNAL MEDICINE

## 2019-08-28 PROCEDURE — 85652 RBC SED RATE AUTOMATED: CPT | Performed by: STUDENT IN AN ORGANIZED HEALTH CARE EDUCATION/TRAINING PROGRAM

## 2019-08-28 PROCEDURE — 80048 BASIC METABOLIC PNL TOTAL CA: CPT | Performed by: HOSPITALIST

## 2019-08-28 PROCEDURE — 84145 PROCALCITONIN (PCT): CPT | Performed by: HOSPITALIST

## 2019-08-28 PROCEDURE — 99232 SBSQ HOSP IP/OBS MODERATE 35: CPT | Performed by: STUDENT IN AN ORGANIZED HEALTH CARE EDUCATION/TRAINING PROGRAM

## 2019-08-28 PROCEDURE — 85025 COMPLETE CBC W/AUTO DIFF WBC: CPT | Performed by: HOSPITALIST

## 2019-08-28 PROCEDURE — 99233 SBSQ HOSP IP/OBS HIGH 50: CPT | Performed by: HOSPITALIST

## 2019-08-28 PROCEDURE — 97166 OT EVAL MOD COMPLEX 45 MIN: CPT

## 2019-08-28 PROCEDURE — G8978 MOBILITY CURRENT STATUS: HCPCS

## 2019-08-28 PROCEDURE — 97530 THERAPEUTIC ACTIVITIES: CPT

## 2019-08-28 PROCEDURE — 86140 C-REACTIVE PROTEIN: CPT | Performed by: STUDENT IN AN ORGANIZED HEALTH CARE EDUCATION/TRAINING PROGRAM

## 2019-08-28 PROCEDURE — 97163 PT EVAL HIGH COMPLEX 45 MIN: CPT

## 2019-08-28 RX ORDER — FUROSEMIDE 10 MG/ML
40 INJECTION INTRAMUSCULAR; INTRAVENOUS
Status: DISCONTINUED | OUTPATIENT
Start: 2019-08-28 | End: 2019-08-29 | Stop reason: HOSPADM

## 2019-08-28 RX ORDER — METOPROLOL SUCCINATE 50 MG/1
100 TABLET, EXTENDED RELEASE ORAL EVERY 12 HOURS
Status: DISCONTINUED | OUTPATIENT
Start: 2019-08-28 | End: 2019-08-29 | Stop reason: HOSPADM

## 2019-08-28 RX ORDER — LEVOFLOXACIN 750 MG/1
750 TABLET ORAL EVERY 24 HOURS
Status: DISCONTINUED | OUTPATIENT
Start: 2019-08-28 | End: 2019-08-29

## 2019-08-28 RX ADMIN — DILTIAZEM HYDROCHLORIDE 120 MG: 60 TABLET, FILM COATED ORAL at 14:25

## 2019-08-28 RX ADMIN — METOPROLOL TARTRATE 5 MG: 1 INJECTION, SOLUTION INTRAVENOUS at 11:02

## 2019-08-28 RX ADMIN — FUROSEMIDE 40 MG: 10 INJECTION, SOLUTION INTRAMUSCULAR; INTRAVENOUS at 17:48

## 2019-08-28 RX ADMIN — METOPROLOL TARTRATE 75 MG: 50 TABLET, FILM COATED ORAL at 09:28

## 2019-08-28 RX ADMIN — ATORVASTATIN CALCIUM 40 MG: 40 TABLET, FILM COATED ORAL at 17:47

## 2019-08-28 RX ADMIN — DILTIAZEM HYDROCHLORIDE 120 MG: 60 TABLET, FILM COATED ORAL at 05:31

## 2019-08-28 RX ADMIN — MELATONIN 3 MG: at 21:18

## 2019-08-28 RX ADMIN — APIXABAN 5 MG: 5 TABLET, FILM COATED ORAL at 09:27

## 2019-08-28 RX ADMIN — FUROSEMIDE 40 MG: 10 INJECTION, SOLUTION INTRAMUSCULAR; INTRAVENOUS at 09:27

## 2019-08-28 RX ADMIN — METOPROLOL TARTRATE 5 MG: 1 INJECTION, SOLUTION INTRAVENOUS at 18:29

## 2019-08-28 RX ADMIN — FUROSEMIDE 40 MG: 10 INJECTION, SOLUTION INTRAMUSCULAR; INTRAVENOUS at 12:21

## 2019-08-28 RX ADMIN — DILTIAZEM HYDROCHLORIDE 120 MG: 60 TABLET, FILM COATED ORAL at 21:18

## 2019-08-28 RX ADMIN — METOPROLOL SUCCINATE 100 MG: 50 TABLET, EXTENDED RELEASE ORAL at 23:12

## 2019-08-28 RX ADMIN — APIXABAN 5 MG: 5 TABLET, FILM COATED ORAL at 17:48

## 2019-08-28 RX ADMIN — ESCITALOPRAM OXALATE 20 MG: 10 TABLET ORAL at 09:27

## 2019-08-28 RX ADMIN — LEVOFLOXACIN 750 MG: 750 TABLET, FILM COATED ORAL at 09:27

## 2019-08-28 NOTE — PROGRESS NOTES
Critical Care Interval Progress Note     Lucie vIan 78 y o  female MRN: [id number]    Unit/Bed#: ICU 03 Encounter: [id number]    Impression:  Principal Problem:    Sepsis due to pneumonia [facility])  Active Problems:    Acute respiratory failure with hypoxia ([facility] )    Atrial fibrillation with rapid ventricular response ([facility] )    Essential hypertension    Morbid obesity ([facility] )    Major depressive disorder in full remission ([facility] )  Resolved Problems:    * No resolved hospital problems  *    Ms Debby Schmitt is a 79 yo woman with PMH of HTN and super morbid obesity who was admitted 8/26 with acute hypoxic respiratory insufficiency and new onset Afib with RVR  Plan:  Neuro:   No active issues, continue delirium precautions  Melatonin ordered for sleep aid  Did have CT/MRI brain earlier this year during work-up of syncope which were reportedly normal      CV:   New onset atrial fibrillation with RVR:  Rate controlled improved but persistently elevated with intermittent increases into 130s with minimal activity despite cardizem and metoprolol  Appreciate cardiology recommendations  Continue aggressive diuresis  Troponin negative x3  No RWMA  Systolic function estimated at 60% on TTE 8/26  TSH within normal limits  Electrolytes within acceptable limits  Volume overload:  Suspect this is 2/2 diastolic dysfunction in setting of new onset Afib with RVR  Aggressive diuresis, lasix dose increased to 40 mg TID today per cardiology  Would target at least 2L net negative over 24H      Lung:   Acute hypoxic respiratory failure:  Still requiring 6L nc to maintain saturations in low 90s with saturations drifting into high 80s repeatedly overnight  Per history and physical exam this seems less likely infectious and more likely cardiac in origin  Agree with aggressive diuresis as per cardiology, lasix dose increased today to 40 mg IV TID    However, despite 1 5 net negative since yesterday with minimal, if any, improvement in oxygenation  Concern for multifocal pneumonia based on imaging and presence of low grade fever of 38 2 with leukocytosis  CTA negative for acute PE  Normal renal function makes possible vasculitis less likely  ESR mildly elevated  Possible community acquired pneumonia:  Patient with no preceding infectious prodrome and denies fevers/chills/cough at home  No sick contacts  However, did have leukocytosis, low grade fever, tachypnea, and oxygen requirement on admission  However, now afebrile x24H and with decreasing WBC  Procalcitonin negative x2  Transitioned to levofloxacin today, but could consider d/c antibiotics based on negative procalcitonin x2  Pleural effusion:  May be related to new onset Afib with diastolic dysfunction versus infectious and contributing to shortness of breath and oxygen requirement  Suspicion for YUDI:  Patient with nocturnal desaturations and evidence of RV wall thickening on echocardiogram, recommend outpatient sleep study  Will order prn vapotherm to be used if requires more supplemental oxygen at night (or during the day) while diuresis ongoing      GI:   No active issues  Tolerating diet with bowel regimen in place      F/E/N:   Electrolytes within acceptable limits  Replacement as indicated      :  No active issues, will continue to monitor renal function closely in setting of diuresis      ID:   Possible community acquired pneumonia:  History not consistent with pneumonia, but patient did present with leukocytosis, tachypnea, and oxygen requirement and developed low grade fever on hospital day 1  Switched from ceftriaxone/azithro to levofloxacin today  Procalcitonin negative x2  Could consider d/c antibiotics  Possible UTI, POA:  Urinalysis on admission with moderate bacteria and +nitrite  Unfortunately, specimen was not sent for culture    However, both ceftriaxone and levofloxacin should provide sufficient coverage for UTI and today will be day 3 of antibiotics in setting of uncomplicated UTI  Blood cultures from  with no growth at 24H      Heme:   Eliquis for systemic anticoagulation in setting of new onset Afib  Slight decline in hemoglobin since admission despite overall negative volume balance  No other signs/symptoms of active or ongoing bleeding but will continue to monitor carefully in setting of initiation of systemic AC      Endo:   No history of diabetes, glucose has been <180 since admission        Msk/Skin:   Continue meticulous skin care  Encourage mobility as able given tachycardia and oxygenation      Disposition: Continue ICU care      Counseling / Coordination of Care: Total time spent today 24 minutes  Greater than 50% of total time was spent with the patient and / or family counseling and / or coordination of care  A description of the counseling / coordination of care: Discussion with patient, bedside nurse, and hospitalist  ______________________________________________________________________    Chief Complaint: "I feel so much better today"    Recent Events / Nursing Concern: HR persistently elevated overnight and intermittently desaturates into high 80s  Vitals:   Vitals:    19 0800 19 0900 19 1000 19 1100   BP: 124/79 127/74 136/64 119/81   BP Location: Right arm   Right arm   Pulse: (!) 127 (!) 134 (!) 129 (!) 116   Resp: (!) 26 (!) 26 (!) 25 (!) 32   Temp: 98 °F (36 7 °C)      TempSrc: Tympanic   Tympanic   SpO2: 90% (!) 84%  90%   Weight:       Height:                 Temperature: Temp (24hrs), Av 1 °F (36 7 °C), Min:96 8 °F (36 °C), Max:99 °F (37 2 °C)  Current: Temperature: 98 °F (36 7 °C)    Hemodynamic Monitoring:  N/A       Respiratory:  SpO2 Device: O2 Device: Nasal cannula  O2 Flow Rate (L/min): 6 L/min    Physical Exam:  Physical Exam   Constitutional: She appears well-developed and well-nourished  No distress  HENT:   Head: Normocephalic and atraumatic  Cardiovascular: Intact distal pulses  Irregularly irregular rhythm, rapid rate  Distale pulses intact  +2 LE edema bilaterally   Pulmonary/Chest: Effort normal    Tachypneic but no increased work of breathing or use of accessory muscles  Able to speak in full sentences  +Crackles, no wheezing   Abdominal: Soft  Genitourinary:   Genitourinary Comments: Clear yellow urine in murcia bag   Musculoskeletal: She exhibits edema  Skin: Skin is warm and dry  Psychiatric: She has a normal mood and affect  Allergies:    Allergies   Allergen Reactions    Penicillins Rash       Medications:   Scheduled Meds:  Current Facility-Administered Medications:  acetaminophen 650 mg Oral Q6H PRN Lorna Ortiz MD   apixaban 5 mg Oral BID Myra Jordan PA-C   atorvastatin 40 mg Oral Daily With Sally Ríos MD   diltiazem 120 mg Oral Q8H [facility]   diphenhydrAMINE 25 mg Oral HS PRN Ryann Ramirez MD   docusate sodium 100 mg Oral BID Ute Santiago MD   escitalopram 20 mg Oral Daily Lorna Ortiz MD   furosemide 40 mg Intravenous TID (diuretic) Violeta Wallis MD   levofloxacin 750 mg Oral Q24H Wendie Luna MD   melatonin 3 mg Oral HS [facility]   metoprolol 5 mg Intravenous Q6H PRN Violeta Wallis MD   metoprolol succinate 100 mg Oral Q12H Violeta Wallis MD   ondansetron 4 mg Intravenous Q6H PRN Lorna Ortiz MD     Continuous Infusions:   PRN Meds:    acetaminophen 650 mg Q6H PRN   diphenhydrAMINE 25 mg HS PRN   metoprolol 5 mg Q6H PRN   ondansetron 4 mg Q6H PRN       Labs:   Results from last 7 days   Lab Units 08/28/19  0445 08/27/19  0503 08/26/19  0918   WBC Thousand/uL 10 60 14 30* 18 40*   HEMOGLOBIN g/dL 11 6* 12 1 13 5   HEMATOCRIT % 34 3* 36 2* 40 7*   PLATELETS Thousands/uL 259 237 263   NEUTROS PCT % 79* 82* 85*   MONOS PCT % 11 10 9     Results from last 7 days   Lab Units 08/28/19  0445 08/27/19  0503 08/26/19  0918   SODIUM mmol/L 139 139 136   POTASSIUM mmol/L 3 6 3 7 3 6   CHLORIDE mmol/L 104 108* 101   CO2 mmol/L 27 22 24   ANION GAP mmol/L 8 9 11   BUN mg/dL 25 21 20   CREATININE mg/dL 0 85 0 75 0 83   CALCIUM mg/dL 8 7 8 7 9 4   GLUCOSE RANDOM mg/dL 132* 116* 173*   ALT U/L  --  22 18   AST U/L  --  18 13   ALK PHOS U/L  --  116 98   ALBUMIN g/dL  --  3 7 4 2   TOTAL BILIRUBIN mg/dL  --  1 10* 1 50*     Results from last 7 days   Lab Units 08/27/19  0503   MAGNESIUM mg/dL 2 2      Results from last 7 days   Lab Units 08/26/19  0918   INR  1 26   PTT seconds 34      Results from last 7 days   Lab Units 08/26/19  1729 08/26/19  1239 08/26/19  0918   TROPONIN I ng/mL <0 03 <0 03 <0 03     Results from last 7 days   Lab Units 08/26/19  1239   LACTIC ACID mmol/L 1 3     ABG:    VBG:    Results from last 7 days   Lab Units 08/28/19  0445 08/27/19  0503   PROCALCITONIN ng/ml <0 05 <0 05       Diagnostic Imaging / Data: I have personally reviewed pertinent reports  and I have personally reviewed pertinent films in PACS  EKG: Atrial fibrillation with rapid ventricular response  Code Status: Level 1 - Full Code    Portions of the record may have been created with voice recognition software  Occasional wrong word or "sound a like" substitutions may have occurred due to the inherent limitations of voice recognition software  Read the chart carefully and recognize, using context, where substitutions have occurred      SIGNATURE: Génesis Chino MD  DATE: August 28, 2019  TIME: 1:08 PM

## 2019-08-28 NOTE — SOCIAL WORK
Chart reviewed by case management, assessment was completed at the bedside this ibethg and the pt was still on a cardizem drip, pt is on 6 liters of oxygen and she does not have home oxygen, pt has a rx plan at [address], pt has a cane, walker & bp cuff, pt denies smoking and states that she does drink alcohol on occasion, pt denies any d/c needs, cm will reassess, pt does not have home oxygen, pt not stable for d/c as discussed at care coordination rounds today,  Patient/caregiver received discharge checklist   Content reviewed  Patient/caregiver encouraged to participate in discharge plan of care prior to discharge home  CM reviewed d/c planning process including the following: identifying help at home, patient preference for d/c planning needs, availability of treatment team to discuss questions or concerns patient and/or family may have regarding understanding medications and recognizing signs and symptoms once discharged  CM also encouraged patient to follow up with all recommended appointments after discharge  Patient advised of importance for patient and family to participate in managing patients medical well being

## 2019-08-28 NOTE — PLAN OF CARE
Problem: DISCHARGE PLANNING - CARE MANAGEMENT  Goal: Discharge to post-acute care or home with appropriate resources  Description  INTERVENTIONS:  - Conduct assessment to determine patient/family and health care team treatment goals, and need for post-acute services based on payer coverage, community resources, and patient preferences, and barriers to discharge  - Address psychosocial, clinical, and financial barriers to discharge as identified in assessment in conjunction with the patient/family and health care team  - Arrange appropriate level of post-acute services according to patient's   needs and preference and payer coverage in collaboration with the physician and health care team  - Communicate with and update the patient/family, physician, and health care team regarding progress on the discharge plan  - Arrange appropriate transportation to post-acute venues    Pt in Icu need to reassess d/c plan     Outcome: Progressing

## 2019-08-28 NOTE — PHYSICAL THERAPY NOTE
Physical Therapy Evaluation     Patient's Name: Talita Naik    Admitting Diagnosis  Cardiomegaly [I51 7]  Acute cystitis [N30 00]  Shortness of breath [R06 02]  Cholelithiasis [K80 20]  Hypertension [I10]  Hypoxia [R09 02]  Bilateral pneumonia [J18 9]  Pleural effusion, bilateral [J90]  Atrial fibrillation with RVR ([address] ) [I48 91]  Sepsis ([address] ) [A41 9]    Problem List  Patient Active Problem List   Diagnosis    Acute respiratory failure with hypoxia ([address] )    Sepsis due to pneumonia ([address] )    Atrial fibrillation with rapid ventricular response ([address] )    Essential hypertension    Morbid obesity ([address] )    Major depressive disorder in full remission [facility])       Past Medical History  Past Medical History:   Diagnosis Date    Anxiety     Hyperlipidemia     Hypertension     Obesity        Past Surgical History  Past Surgical History:   Procedure Laterality Date    TONSILLECTOMY          08/28/19 0800   Note Type   Note type Eval/Treat   Pain Assessment   Pain Assessment No/denies pain   Pain Score No Pain   Diversional Activities Television   Home Living   Type of [address] One level;Stairs to enter with rails; Performs ADLs on one level; Able to live on main level with bedroom/bathroom  (2 AZAM)   Bathroom Shower/Tub Walk-in shower   Bathroom Toilet Standard   Bathroom Accessibility [facility]  (PTA, pt  did not utilize an AD or DME )   Prior Function   Level of [place] Independent with ADLs and functional mobility   Lives With Alone   ADL Assistance Independent   IADLs Independent   Falls in the last 6 months 1 to 4  (x1, "tripped")   Vocational Full time employment   Restrictions/Precautions   Weight Bearing Precautions Per Order No   Other Precautions Telemetry;Multiple lines;O2;Fall Risk  (6 L O2,pt  did not utilize at home)   General   Family/Caregiver Present No   Cognition   Overall Cognitive Status WFL   Arousal/Participation Alert   Orientation Level Oriented X4 Memory Within functional limits   Following Commands Follows one step commands without difficulty   Comments OT completed mini-cog w/a score of 5 out of possible 5    RUE Assessment   RUE Assessment WFL   LUE Assessment   LUE Assessment WFL   RLE Assessment   RLE Assessment X   Strength RLE   R Hip Flexion 3+/5   R Knee Extension 3+/5   R Ankle Dorsiflexion 3+/5   LLE Assessment   LLE Assessment X   Strength LLE   L Hip Flexion 3+/5   L Knee Extension 3+/5   L Ankle Dorsiflexion 3+/5   Coordination   Movements are Fluid and Coordinated 1   Light Touch   RLE Light Touch Grossly intact   LLE Light Touch Grossly intact   Sharp/Dull   RLE Sharp/Dull Grossly intact   LLE Sharp/Dull Grossly intact   Bed Mobility   Rolling L 5  Supervision   Additional items HOB elevated; Bedrails; Increased time required;Verbal cues   Supine to Sit 4  Minimal assistance   Additional items Assist x 1;HOB elevated; Bedrails; Increased time required;Verbal cues;LE management   Sit to Supine   (DNT as pt  remained OOB in chair upon conclusion )   Transfers   Sit to Stand 5  Supervision   Additional items Assist x 1;HOB elevated; Bedrails; Increased time required;Verbal cues   Stand to Sit 5  Supervision   Additional items Assist x 1; Armrests; Verbal cues   Stand pivot 5  Supervision   Additional items Assist x 1;Verbal cues   Ambulation/Elevation   Gait pattern Narrow ANA CRISTINA; Forward Flexion;Decreased foot clearance; Short stride   Gait Assistance   (CGA)   Additional items Assist x 1;Verbal cues; Tactile cues  (for ANA CRISTINA adjustment, directional change x 1)   Assistive Device Rolling walker   Distance 10 feet  (bedside->chair)   Stair Management Assistance Not tested   Balance   Static Sitting Normal   Dynamic Sitting Good   Static Standing Good   Dynamic Standing Fair +   Ambulatory Fair +   Endurance Deficit   Endurance Deficit Yes   Endurance Deficit Description ->133->140 /71 POX 88% w/O2 maintained throughout session   Activity Tolerance Activity Tolerance Patient limited by fatigue   Medical Staff Made Aware yes, CM informed of assessment outcome  Nurse Made Aware yes, Renae RN   Assessment   Prognosis Good   Problem List Decreased strength;Decreased endurance; Impaired balance;Decreased mobility; Decreased safety awareness; Obesity   Assessment Pt is 78 y o  female seen for PT evaluation s/p admit to [facility] ICU on 8/26/2019 w/ Sepsis due to pneumonia [facility])  PT consulted to assess pt's functional mobility and d/c needs  Order placed for PT eval and tx, w/ activity as tolerated order  Comorbidities affecting pt's physical performance at time of assessment include: sepsis due to pneumonia, ARF w/hypoxia, A-fib w/RVR, HTN, morbid obesity, MDD   PTA, pt was independent w/ all functional mobility w/ o AD  Personal factors affecting pt at time of IE include: ambulating w/ assistive device, inability to ambulate household distances, inability to navigate community distances, unable to perform dynamic tasks in community, positive fall history and inability to perform IADLs  Please find objective findings from PT assessment regarding body systems outlined above with impairments and limitations including weakness, impaired balance, decreased endurance, gait deviations, decreased activity tolerance, decreased safety awareness and fall risk  The following objective measures performed on IE also reveal limitations: Barthel Index: 55/100  Pt's clinical presentation is currently unstable/unpredictable  Pt to benefit from continued PT tx to address deficits as defined above and maximize level of functional independent mobility and consistency  From PT/mobility standpoint, recommendation at time of d/c would be Home PT vs  STR pending progress in order to facilitate return to PLOF  Barriers to Discharge Decreased caregiver support   Barriers to Discharge Comments Pt  lives alone     Goals   Patient Goals feel better soon   LTG Expiration Date 09/07/19   Long Term Goal #1 1 )Patient will complete bed mobility modified I  for decrease need for caregiver assistance, decrease burden of care  2 ) Patient will complete transfers modified I  to decrease risk of falls, facilitate upright standing posture  3 ) BLE strength to greater than/equal to 4-/5 gross musculature to increase ability to safely transfer, control descent to chair  4 ) Patient will exhibit increase dynamic standing balance to Good , for 3-5 minutes without LOB modified I  to improve activity tolerance & endurance  5 ) Patient will exhibit increase dynamic ambulatory balance to Good for 150 feet w/RW modified I to improve ability to mobilize to toilet, chair and decrease risk for additional medical complications  6 ) Patient will exhibit good self monitoring and ability to follow 2 step commands to increase complexity of tasks and resume ADL's without LOB  Treatment Day 1   Plan   Treatment/Interventions Functional transfer training;LE strengthening/ROM; Therapeutic exercise; Endurance training;Bed mobility;Gait training;Equipment eval/education;Spoke to nursing;OT  (&neuro re-education w/balance training)   PT Frequency Other (Comment)  (3-5x/wk)   Recommendation   Recommendation Short-term skilled PT   Equipment Recommended Walker  (will monitor progress to determine short term need for AD)   PT - OK to Discharge Yes  (if medically stable to STR)   Additional Comments Upon conclusion, pt  was resting OOB in chair w/SCD's active & all needs within reach  Barthel Index   Feeding 10   Bathing 5   Grooming Score 5   Dressing Score 10   Bladder Score 5   Bowels Score 5   Toilet Use Score 5   Transfers (Bed/Chair) Score 10   Mobility (Level Surface) Score 0   Stairs Score 0   Barthel Index Score 55     Additional skilled interventions: Therapeutic Activity x 12 minutes    S: No reported pain prior or during session, patient eager and agreeable to mobilize OOB;A&Ox4      O: therapeutic activity x 12 minutes including mobilization education: bed mobility, supine->sit, static/dynamic sitting balance w/ postural facilitation, sit<->stand transfers, static/dynamic standing balance w/ postural facilitation,and continued safety training for increased awareness  A: Patient exhibited  weakness, impaired balance, gait dysfunction, decreased endurance, activity intolerance, and decline from PLOF to benefit from continued interventions  P: Continue PT tx with progression of functional tasks as patient can safely tolerate, 3-5x/week        Debi Pressley, PT

## 2019-08-28 NOTE — PROGRESS NOTES
Progress Note - Effie Olmos 1950, 78 y o  female MRN: [id number]    Unit/Bed#: ICU 03 Encounter: [id number]    Primary Care Provider: Chaka Claros MD   Date and time admitted to hospital: 8/26/2019  9:27 AM        * Sepsis due to pneumonia [facility])  Assessment & Plan  · CTA chest with multifocal pneumonia - however patient's clinical history is not completely convincing for a pneumonia  · Urine for Streptococcus pneumoniae and Legionella antigen is negative  · Blood cultures negative thus far  · C reactive protein elevated, procalcitonin testing within normal limits  · Transition patient to p o   Levofloxacin, patient is penicillin allergic   · White blood cell count has resolved  · Patient is afebrile  · Status post an Interventional Radiology evaluation for thoracentesis, no thoracentesis is indicated at this time  · [address] input    Atrial fibrillation with rapid ventricular response ([facility] )  Assessment & Plan  · New onset Afib  · Management as per Cardiology  · Remains on a diltiazem drip  · Oral rate-controlling medications include metoprolol tartrate 50 mg q 12 hours as well as Cardizem 120 mg every 8 hours  · Continue apixaban for anticoagulation purposes    Acute respiratory failure with hypoxia (HCC)  Assessment & Plan  · Oxygen requirements have improved  · Secondary to suspected diastolic dysfunction CHF with an acute exacerbation as well as a community-acquired pneumonia  · See the outlines above    Morbid obesity ([facility] )  Assessment & Plan  · BMI of 55 36  · Dietary, weight loss, and lifestyle modification counseling provided  ·  Supportive care  · Status post a nutrition eval    Essential hypertension  Assessment & Plan  · Continue metoprolol and Cardizem for now    Major depressive disorder in full remission ([facility] )  Assessment & Plan  · Continue Lexapro        VTE Pharmacologic Prophylaxis: Pharmacologic: Apixaban (Eliquis)    Patient Centered Rounds: I have performed bedside rounds with nursing staff today  Discussions with Specialists or Other Care Team Provider:  Cardiology, Critical Care, case management, nursing and pharmacy  Education and Discussions with Family / Patient:  Patient was brought up par with the plan of care for today, all questions answered to her satisfaction    Current Length of Stay: 2 day(s)    Current Patient Status: Inpatient   Certification Statement: The patient will continue to require additional inpatient hospital stay due to The need for continued IV Cardizem    Discharge Plan:  Hopeful discharge planning in 24 hours    Code Status: Level 1 - Full Code    Subjective:   Patient seen and examined  Feels 100% better than prior to coming into the hospital   This happy that she can get her rings off of her fingers  Denies pain or discomfort    Objective:     Vitals:   Temp (24hrs), Av 5 °F (36 9 °C), Min:96 8 °F (36 °C), Max:99 7 °F (37 6 °C)    Temp:  [96 8 °F (36 °C)-99 7 °F (37 6 °C)] 99 °F (37 2 °C)  HR:  [] 99  Resp:  [21-32] 21  BP: (112-150)/(60-99) 124/99  SpO2:  [82 %-92 %] 87 %  Body mass index is 55 36 kg/m²  Input and Output Summary (last 24 hours): Intake/Output Summary (Last 24 hours) at 2019 0846  Last data filed at 2019 0701  Gross per 24 hour   Intake 360 ml   Output 2400 ml   Net -2040 ml       Physical Exam:     Physical Exam   Constitutional: She is oriented to person, place, and time  She appears well-developed and well-nourished  HENT:   Head: Normocephalic and atraumatic  Nose: Nose normal    Mouth/Throat: Oropharynx is clear and moist    Eyes: Pupils are equal, round, and reactive to light  Conjunctivae and EOM are normal    Neck: Normal range of motion  Neck supple  No JVD present  No thyromegaly present  Cardiovascular: Normal rate, regular rhythm and intact distal pulses  Exam reveals no gallop and no friction rub  No murmur heard    Pulmonary/Chest: Effort normal and breath sounds normal  No respiratory distress  Decreased breath sounds bilaterally at the bases  Faint crackles and rales appreciated bilaterally at the bases   Abdominal: Soft  Bowel sounds are normal  She exhibits no distension and no mass  There is no tenderness  There is no guarding  Musculoskeletal: Normal range of motion  She exhibits no edema  Lymphadenopathy:     She has no cervical adenopathy  Neurological: She is alert and oriented to person, place, and time  No cranial nerve deficit  Skin: Skin is warm  No rash noted  No erythema  Psychiatric: She has a normal mood and affect  Her behavior is normal    Vitals reviewed  Additional Data:     Labs:    Results from last 7 days   Lab Units 08/28/19  0445   WBC Thousand/uL 10 60   HEMOGLOBIN g/dL 11 6*   HEMATOCRIT % 34 3*   PLATELETS Thousands/uL 259   NEUTROS PCT % 79*   LYMPHS PCT % 10*   MONOS PCT % 11   EOS PCT % 1     Results from last 7 days   Lab Units 08/28/19  0445 08/27/19  0503   POTASSIUM mmol/L 3 6 3 7   CHLORIDE mmol/L 104 108*   CO2 mmol/L 27 22   BUN mg/dL 25 21   CREATININE mg/dL 0 85 0 75   CALCIUM mg/dL 8 7 8 7   ALK PHOS U/L  --  116   ALT U/L  --  22   AST U/L  --  18     Results from last 7 days   Lab Units 08/26/19  0918   INR  1 26               * I Have Reviewed All Lab Data Listed Above  * Additional Pertinent Lab Tests Reviewed: [address] Admission  Reviewed    Imaging:  Imaging Reports Reviewed Today Include:  None    Recent Cultures (last 7 days):     Results from last 7 days   Lab Units 08/26/19  1526 08/26/19  1239 08/26/19  1235 08/26/19  1112   BLOOD CULTURE   --  No Growth at 24 hrs  No Growth at 24 hrs    --    LEGIONELLA URINARY ANTIGEN  Negative  --   --  Negative       Last 24 Hours Medication List:     Current Facility-Administered Medications:  acetaminophen 650 mg Oral Q6H PRN Dianna Jones MD    apixaban 5 mg Oral BID Clair Primrose, PA-C    atorvastatin 40 mg Oral Daily With Emanuel Bose MD diltiazem (CARDIZEM) 125 mg in sodium chloride 0 9% 125 mL infusion 1-15 mg/hr Intravenous Titrated Stanley Candelaria MD Last Rate: 2 5 mg/hr (08/28/19 0500)   diltiazem 120 mg Oral Q8H [address] AYO Golden    diphenhydrAMINE 25 mg Oral HS PRN Obed Ohara MD    docusate sodium 100 mg Oral BID Maxime Humphreys MD    escitalopram 20 mg Oral Daily Stanley Candelaria MD    furosemide 40 mg Intravenous BID (diuretic) Brittany Jenkins PA-C    levofloxacin 750 mg Oral Q24H Niles Castaneda MD    melatonin 3 mg Oral HS AYO Golden    metoprolol 5 mg Intravenous Q6H PRN Neetu Horowitz MD    metoprolol tartrate 50 mg Oral Q12H [address] Neetu Horowitz MD    ondansetron 4 mg Intravenous Q6H PRN Stanley Candelaria MD         Today, Patient Was Seen By: Niles Castaneda MD    ** Please Note: Dictation voice to text software may have been used in the creation of this document   **

## 2019-08-28 NOTE — OCCUPATIONAL THERAPY NOTE
Occupational Therapy Evaluation      Shanti Valeri    8/28/2019    Patient Active Problem List   Diagnosis    Acute [facility] failure with [facility] ([facility] )    Sepsis due to pneumonia ([facility] )    [facility]    Essential hypertension    Morbid obesity ([facility] )    Major depressive disorder in full remission ([facility] )       Past Medical History:   Diagnosis Date    Anxiety     Hyperlipidemia     Hypertension     Obesity        Past Surgical History:   Procedure Laterality Date    TONSILLECTOMY          08/28/19 0816   Note Type   Note type Eval only   Restrictions/Precautions   Weight Bearing Precautions Per Order No   Other Precautions Telemetry;Multiple lines;O2;Fall Risk  (6 L O2- not utilized at home )   Pain Assessment   Pain Assessment No/denies pain   Pain Score No Pain   Home Living   Type of [address] [place] [address] One level;Stairs to enter with rails; Able to live on main level with bedroom/bathroom; Performs ADLs on one level  (2 AZAM)   Bathroom Shower/Tub Walk-in shower   Bathroom Toilet Standard   Bathroom Equipment   (no DME at baseline )   [facility]  (no AD utilized at baseline )   Prior Function   Level of [place] Independent with ADLs and functional mobility   Lives With Alone   ADL Assistance Independent   IADLs Independent   Falls in the last 6 months 1 to 4  (x1, "tripped")   Vocational Full time employment   Lifestyle   Autonomy Patient reporting independent with ADLs/IADLs, lives alone in a one level house with 2 AZAM, and ambulates with no AD     Reciprocal Relationships friends    Service to Others works full-time    ADL   Eating Assistance 6  Modified independent   [facility] 6  [address] [place] [address] 5  [facility] 3  Moderate Assistance Toileting Assistance  4  Minimal Assistance   Bed Mobility   Supine to Sit 4  Minimal assistance   Additional items Assist x 1;HOB elevated; Bedrails; Increased time required;Verbal cues;LE management   Additional Comments Pt OOB and seated in chair at end of eval    Transfers   Sit to Stand 5  Supervision   Additional items Assist x 1;HOB elevated; Bedrails; Increased time required;Verbal cues   Stand to Sit 5  Supervision   Additional items Assist x 1; Armrests; Verbal cues   Functional Mobility   Functional Mobility 5  Supervision   Additional items Rolling walker   Balance   Static Sitting Normal   Dynamic Sitting Good   Static Standing Good   Dynamic Standing Fair +   Activity Tolerance   Activity Tolerance Patient limited by fatigue   Nurse Made Aware RN [facility] verbalized pt appropriate for therapy and made aware of outcomes   RUE Assessment   RUE Assessment WFL  (AROM <90 degrees, 3/5 MMT)   LUE Assessment   LUE Assessment WFL  (AROM <90 degrees, 4-/5 MMT)   Hand Function   Gross Motor Coordination Functional   Fine Motor Coordination Functional   Sensation   Light Touch No apparent deficits  (per pt report )   Vision-Basic Assessment   Current Vision Wears glasses only for reading   Cognition   Overall Cognitive Status [facility]   Arousal/Participation Alert; Cooperative;Responsive   Attention Within functional limits   Orientation Level Oriented X4   Memory Within functional limits   Following Commands Follows one step commands without difficulty   Comments Mini-Cog assessment performed today  Version 1 was given  Patient able to recall 3/3 words  Patient able to draw a normal clock with the correct time  Total score of test was 5/5 indicating no further screening of cognition is required  Cognition Assessment Tools   (mini-cog)   Score 5   Assessment   Limitation Decreased ADL status; Decreased UE strength;Decreased UE ROM; Decreased endurance;Decreased self-care trans;Decreased high-level ADLs   Assessment Pt is a 78 y o  female who was admitted to [address] [place] [address] on 8/26/2019 with Sepsis due to pneumonia [facility])  At this time, patient is also affected by the comorbidities of: acute respiratory failure with hypoxia, A-fib, HTN, obesity, and depression  Additionally, patient is affected by the following personal factors:steps to enter environment, limited home support, difficulty performing ADLS and difficulty performing IADLS   Orders placed for OT evaluation/treatment with up with assistance orders  Prior to admission, Patient reporting independent with ADLs/IADLs, lives alone in a one level house with 2 AZAM, and ambulates with no AD  Upon OT evaluation, patient requires supervision/set-up and minimum assist for UB ADLs and minimum assist and moderate assist for LB ADLs  Occupational performance is affected by the following deficits: decreased ROM, decreased strength, decreased balance and decreased tolerance  Based on the following information, patient would benefit from continued skilled OT treatment while in the hospital to address deficits and maximize level of functional independence with ADL's and functional mobility  Occupational Performance areas to address include: grooming, bathing/shower, toilet hygiene, dressing, functional mobility, clothing management, meal prep and household maintenance  From OT standpoint, recommendation at time of d/c would be short term rehab  Goals   Patient Goals feel better soon    Plan   Treatment Interventions ADL retraining;Functional transfer training;UE strengthening/ROM; Endurance training;Equipment evaluation/education; Compensatory technique education;Continued evaluation; Energy conservation   Goal Expiration Date 09/07/19   Treatment Day 0   OT Frequency 3-5x/wk   Recommendation   OT Discharge Recommendation Short Term Rehab   OT - OK to Discharge Yes  (Once medically cleared )   Barthel Index   Feeding 10   Bathing 0   Grooming Score 5   Dressing Score 10   Bladder Score 5   Bowels Score 5   Toilet Use Score 5   Transfers (Bed/Chair) Score 10   Mobility (Level Surface) Score 0   Stairs Score 0   Barthel Index Score 50     GOALS:    *ADL transfers with (I) for inc'd independence with ADLs/purposeful tasks    *UB ADL with (I) for inc'd independence with self cares    *LB ADL with (I) using AE prn for inc'd independence with self cares    *Toileting with (I) for clothing management and hygiene for return to PLOF with personal care    *Increase static stand balance to good and dyn stand balance to good for inc'd safety with standing purposeful tasks    *Increase stand tolerance x10 m for inc'd tolerance with standing purposeful tasks    *Participate in 10m UE therex to increase overall stamina/activity tolerance for purposeful tasks    *Bed mobility- (I) for inc'd independence to manage own comfort and initiate EOB & OOB purposeful tasks    *Patient will verbalize 3 safety awareness/ principles to prevent falls in the home setting  *Patient will verbalize and demonstrate use of energy conservation/deep breathing techniques and work simplification skills during functional activities with no verbal cues  *Patient will increase OOB/sitting tolerance to 2-4 hours per day to increase participation in self-care and leisure tasks with no s/s of exertion           Guevara Gonzalez MS, OTR/L

## 2019-08-28 NOTE — PROGRESS NOTES
Cardiology Progress Note - Kirit Muro 78 y o  female MRN: [id number]    Unit/Bed#: ICU 03 Encounter: [id number]      Assessment:  1  New-onset atrial fibrillation with RVR  2  Acute diastolic heart failure  3  ?Sepsis 2/2 multi-focal pneumonia  4  Hypertension  5  Hyperlipidemia  6  Morbid Obesity, Body mass index is 55 36 kg/m²  Plan  New onset atrial fibrillation with RVR  · HR was slightly better controlled overnight, but continues to go to 130s with minimal activity and anxiety  · On cardizem 120 q8h + metoprolol 50 bid  · Given higher dose of metoprolol 75 today morning, but HR remains elevated in between  · Give IV metoprolol 5 now  · Change to metoprolol  bid  · Was digoxin loaded 2 days ago, but currently not on maintenance  Will consider adding that if HR remains uncontrolled  · Sleep study as outpatient    Acute diastolic heart failure  · Likely 2/2 rapid afib over the past week  · On IV lasix 40 bid, with good diuresis during the day 1 9 L  · Wt still up 291 lbs - ?inaccurate  · Overnight UO not documented  · She feels better today, but is still hypoxic at 90% on 6 L NC  · Still appears volume overloaded  · Will increase IV lasix to 40 tid      Subjective:    No significant events overnight  Review of Systems  No c/o chest pain, No c/o palpitations, c/o shortness of breath, No c/o dizziness or light-headedness, No c/o abdominal pain, no c/o nausea/vomitting  All other systems negative    Telemetry Review: Afib, HR 90s overnight, now back up to 130s    Objective:   Vitals: Blood pressure 127/74, pulse (!) 134, temperature 98 °F (36 7 °C), temperature source Tympanic, resp  rate (!) 26, height 5' 1" (1 549 m), weight 133 kg (293 lb), SpO2 (!) 84 %  , Body mass index is 55 36 kg/m² ,   Orthostatic Blood Pressures      Most Recent Value   Blood Pressure  127/74 filed at 08/28/2019 0900   Patient Position - Orthostatic VS  Sitting filed at 08/28/2019 5600         Systolic (59UIU), OQU:959 , Min:112 , ZTA:867     Diastolic (49QNL), PUB:63, Min:60, Max:99    Wt Readings from Last 5 Encounters:   08/28/19 133 kg (293 lb)     I/O       08/26 0701 - 08/27 0700 08/27 0701 - 08/28 0700 08/28 0701 - 08/29 0700    P  O   360     I V  (mL/kg) 135 3 (1)  281 5 (2 1)    IV Piggyback 1300      Total Intake(mL/kg) 1435 3 (11) 360 (2 7) 281 5 (2 1)    Urine (mL/kg/hr) 2000 1900 (0 6) 500 (0 9)    Stool  0 0    Total Output 2000 1900 500    Net -564 8 -1540 -218 5           Unmeasured Stool Occurrence  1 x 1 x              Physical Exam    Constitutional:  Geovani Pemberton appears well, alert and oriented x 3, pleasant and cooperative  No acute distress   HEENT:    Normocephalic, atraumatic   Neck:  Supple, No JVD   Lungs:  Clear to auscultation bilaterally, respirations unlabored    Chest Wall:  No tenderness or deformity, right basal rales and decreased breath sounds   Heart::  Irregularly, irregular, tachycardia+, S1 and S2 normal, no murmur, rub or gallop    Abdomen:  Soft, non-tender, non-distended   Neurological:  Awake, alert, oriented x3   Non-focal exam    Extremities:  B/L 1-2+ edema, No calf tenderness         Laboratory Results:  Results from last 7 days   Lab Units 08/26/19  1729 08/26/19  1239 08/26/19  0918   TROPONIN I ng/mL <0 03 <0 03 <0 03       CBC with diff:   Results from last 7 days   Lab Units 08/28/19  0445 08/27/19  0503 08/26/19  0918   WBC Thousand/uL 10 60 14 30* 18 40*   HEMOGLOBIN g/dL 11 6* 12 1 13 5   HEMATOCRIT % 34 3* 36 2* 40 7*   MCV fL 90 90 91   PLATELETS Thousands/uL 259 237 263   MCH pg 30 5 30 1 30 2   MCHC g/dL 33 8 33 3 33 2   RDW % 13 5 13 4 13 7   MPV fL 8 3* 8 5* 8 7         CMP:  Results from last 7 days   Lab Units 08/28/19  0445 08/27/19  0503 08/26/19  0918   POTASSIUM mmol/L 3 6 3 7 3 6   CHLORIDE mmol/L 104 108* 101   CO2 mmol/L 27 22 24   BUN mg/dL 25 21 20   CREATININE mg/dL 0 85 0 75 0 83   CALCIUM mg/dL 8 7 8 7 9 4   AST U/L  --  18 13   ALT U/L  --  22 18   ALK PHOS U/L  --  116 98   EGFR ml/min/1 73sq m 70 81 72         BMP:  Results from last 7 days   Lab Units 08/28/19  0445 08/27/19  0503 08/26/19  0918   POTASSIUM mmol/L 3 6 3 7 3 6   CHLORIDE mmol/L 104 108* 101   CO2 mmol/L 27 22 24   BUN mg/dL 25 21 20   CREATININE mg/dL 0 85 0 75 0 83   CALCIUM mg/dL 8 7 8 7 9 4       BNP:   Recent Labs     08/26/19  0918   *       Magnesium:   Results from last 7 days   Lab Units 08/27/19  0503   MAGNESIUM mg/dL 2 2       Coags:   Results from last 7 days   Lab Units 08/26/19  0918   PTT seconds 34   INR  1 26       TSH:        Hemoglobin A1C       Lipid Profile:       Meds/Allergies   all current active meds have been reviewed and current meds:   Current Facility-Administered Medications   Medication Dose Route Frequency    acetaminophen (TYLENOL) tablet 650 mg  650 mg Oral Q6H PRN    apixaban (ELIQUIS) tablet 5 mg  5 mg Oral BID    atorvastatin (LIPITOR) tablet 40 mg  40 mg Oral Daily With Dinner    diltiazem (CARDIZEM) tablet 120 mg  120 mg Oral Q8H [facility]    diphenhydrAMINE (BENADRYL) tablet 25 mg  25 mg Oral HS PRN    docusate sodium (COLACE) capsule 100 mg  100 mg Oral BID    escitalopram (LEXAPRO) tablet 20 mg  20 mg Oral Daily    furosemide (LASIX) injection 40 mg  40 mg Intravenous BID (diuretic)    levofloxacin (LEVAQUIN) tablet 750 mg  750 mg Oral Q24H    melatonin tablet 3 mg  3 mg Oral HS    metoprolol (LOPRESSOR) injection 5 mg  5 mg Intravenous Q6H PRN    metoprolol succinate (TOPROL-XL) 24 hr tablet 100 mg  100 mg Oral Q12H    ondansetron (ZOFRAN) injection 4 mg  4 mg Intravenous Q6H PRN     Medications Prior to Admission   Medication    amLODIPine-benazepril (LOTREL) 10-40 MG per capsule    ASPIRIN 81 PO    atorvastatin (LIPITOR) 40 mg tablet    escitalopram (LEXAPRO) 20 mg tablet            Cardiac testing:   Results for orders placed during the hospital encounter of 08/26/19   Echo complete with contrast if indicated    Narrative [facility] [address] [place] [address]    Transthoracic Echocardiogram  2D, M-mode, Doppler, and Color Doppler    Study date:  26-Aug-2019    Patient: Lena Rg  MR number: [id number]  Account number: [de-identified]  : 1950  Age: 78 years  Gender: Female  Status: Inpatient  Location: [facility]   Height: 61 in  Weight: 290 4 lb  BP: 122/ 67 mmHg    Indications: Atrial fibrillation  Diagnoses: I48 0 - Atrial fibrillation    Sonographer:  Alicia Campo RDCS  Referring Physician:  Charlette Bedolla MD  Group:  [id number]  Interpreting Physician:  Rukhsana Smyth MD    SUMMARY    LEFT VENTRICLE:  Systolic function was normal  Ejection fraction was estimated to be 60 %  There were no regional wall motion abnormalities  RIGHT VENTRICLE:  Wall thickness was mildly increased  MITRAL VALVE:  There was mild regurgitation  HISTORY: Dyspnea  PROCEDURE: The study was performed in the [facility]  This was a stat study  The transthoracic approach was used  The study included complete 2D imaging, M-mode, complete spectral Doppler, and color Doppler  The heart  rate was 142 bpm, at the start of the study  Images were obtained from the parasternal, apical, subcostal, and suprasternal notch acoustic windows  Intravenous contrast ( 0 8 ml of Definity in NSS) was administered to opacify the left  ventricle  Echocardiographic views were limited due to restricted patient mobility, poor acoustic window availability, decreased penetration, and lung interference  This was a technically difficult study  LEFT VENTRICLE: Size was normal  Systolic function was normal  Ejection fraction was estimated to be 60 %  There were no regional wall motion abnormalities  Wall thickness was normal  No evidence of apical thrombus  DOPPLER: The study was  not technically sufficient to allow evaluation of LV diastolic function      RIGHT VENTRICLE: The size was normal  Systolic function was normal  Wall thickness was mildly increased  LEFT ATRIUM: Size was normal     RIGHT ATRIUM: Size was normal     MITRAL VALVE: Valve structure was normal  There was normal leaflet separation  DOPPLER: The transmitral velocity was within the normal range  There was no evidence for stenosis  There was mild regurgitation  AORTIC VALVE: The valve was trileaflet  Leaflets exhibited normal thickness and normal cuspal separation  DOPPLER: Transaortic velocity was within the normal range  There was no evidence for stenosis  There was no significant  regurgitation  TRICUSPID VALVE: The valve structure was normal  There was normal leaflet separation  DOPPLER: The transtricuspid velocity was within the normal range  There was no evidence for stenosis  There was no significant regurgitation  PULMONIC VALVE: Not well visualized  DOPPLER: The transpulmonic velocity was within the normal range  There was no significant regurgitation  PERICARDIUM: There was no pericardial effusion  The pericardium was normal in appearance  AORTA: The root exhibited normal size  SYSTEMIC VEINS: IVC: The inferior vena cava was normal in size  SYSTEM MEASUREMENT TABLES    2D  %FS: 30 72 %  AV Diam: 2 98 cm  Ao asc: 3 04 cm  EDV(Teich): 106 66 ml  EF(Teich): 58 19 %  ESV(Teich): 44 6 ml  IVSd: 1 29 cm  LA Area: 22 52 cm2  LA Diam: 4 44 cm  LVIDd: 4 78 cm  LVIDs: 3 31 cm  LVPWd: 1 3 cm  RA Area: 24 74 cm2  SI(Teich): 27 96 ml/m2  SV(Cube): 73 06 ml  SV(Teich): 62 07 ml    CW  TR Vmax: 3 12 m/s  TR maxP 88 mmHg    MM  TAPSE: 1 21 cm    Inters\[facility] [place] [facility]\"" Commission Accredited Echocardiography Laboratory    Prepared and electronically signed by    Crow Headley MD  Signed 26-Aug-2019 15:58:59       No results found for this or any previous visit  No results found for this or any previous visit  No results found for this or any previous visit            Counseling / Coordination of Care  Total floor / unit time spent today 25 minutes

## 2019-08-28 NOTE — ASSESSMENT & PLAN NOTE
· Oxygen requirements have improved  · Secondary to suspected diastolic dysfunction CHF with an acute exacerbation as well as a community-acquired pneumonia  · See the outlines above

## 2019-08-28 NOTE — ASSESSMENT & PLAN NOTE
· BMI of 55 36  · Dietary, weight loss, and lifestyle modification counseling provided  ·  Supportive care  · Status post a nutrition eval

## 2019-08-28 NOTE — ASSESSMENT & PLAN NOTE
· CTA chest with multifocal pneumonia - however patient's clinical history is not completely convincing for a pneumonia  · Urine for Streptococcus pneumoniae and Legionella antigen is negative  · Blood cultures negative thus far  · C reactive protein elevated, procalcitonin testing within normal limits  · Transition patient to p o   Levofloxacin, patient is penicillin allergic   · White blood cell count has resolved  · Patient is afebrile  · Status post an Interventional Radiology evaluation for thoracentesis, no thoracentesis is indicated at this time  · [address] input

## 2019-08-28 NOTE — PLAN OF CARE
Problem: PHYSICAL THERAPY ADULT  Goal: Performs mobility at highest level of function for planned discharge setting  See evaluation for individualized goals  Description  Treatment/Interventions: Functional transfer training, LE strengthening/ROM, Therapeutic exercise, Endurance training, Bed mobility, Gait training, Equipment eval/education, Spoke to nursing, OT(&neuro re-education w/balance training)  Equipment Recommended: Walker(will monitor progress to determine short term need for AD)    See flowsheet documentation for full assessment, interventions and recommendations  Patricia Ott, PT     Note:   Prognosis: Good  Problem List: Decreased strength, Decreased endurance, Impaired balance, Decreased mobility, Decreased safety awareness, Obesity  Assessment: Pt is 78 y o  female seen for PT evaluation s/p admit to [facility] ICU on 8/26/2019 w/ Sepsis due to pneumonia [facility])  PT consulted to assess pt's functional mobility and d/c needs  Order placed for PT eval and tx, w/ activity as tolerated order  Comorbidities affecting pt's physical performance at time of assessment include: sepsis due to pneumonia, ARF w/hypoxia, A-fib w/RVR, HTN, morbid obesity, MDD   PTA, pt was independent w/ all functional mobility w/ o AD  Personal factors affecting pt at time of IE include: ambulating w/ assistive device, inability to ambulate household distances, inability to navigate community distances, unable to perform dynamic tasks in community, positive fall history and inability to perform IADLs  Please find objective findings from PT assessment regarding body systems outlined above with impairments and limitations including weakness, impaired balance, decreased endurance, gait deviations, decreased activity tolerance, decreased safety awareness and fall risk  The following objective measures performed on IE also reveal limitations: Barthel Index: 55/100   Pt's clinical presentation is currently unstable/unpredictable  Pt to benefit from continued PT tx to address deficits as defined above and maximize level of functional independent mobility and consistency  From PT/mobility standpoint, recommendation at time of d/c would be Home PT vs  STR pending progress in order to facilitate return to PLOF  Barriers to Discharge: Decreased caregiver support  Barriers to Discharge Comments: Pt  lives alone  Recommendation: Short-term skilled PT     PT - OK to Discharge: Yes(if medically stable to STR)    See flowsheet documentation for full assessment     Francia Stahl, PT

## 2019-08-28 NOTE — ASSESSMENT & PLAN NOTE
· New onset Afib  · Management as per Cardiology  · Remains on a diltiazem drip  · Oral rate-controlling medications include metoprolol tartrate 50 mg q 12 hours as well as Cardizem 120 mg every 8 hours  · Continue apixaban for anticoagulation purposes

## 2019-08-28 NOTE — DISCHARGE INSTRUCTIONS
A-fib (Atrial Fibrillation)   WHAT YOU NEED TO KNOW:   A-fib may come and go, or it may be a long-term condition  A-fib can cause blood clots, stroke, or heart failure  These conditions may become life-threatening  It is important to treat and manage a-fib to help prevent a blood clot, stroke, or heart failure  DISCHARGE INSTRUCTIONS:   Call 911 for any of the following:   · You have any of the following signs of a heart attack:      ¨ Squeezing, pressure, or pain in your chest that lasts longer than 5 minutes or returns    ¨ Discomfort or pain in your back, neck, jaw, stomach, or arm     ¨ Trouble breathing    ¨ Nausea or vomiting    ¨ Lightheadedness or a sudden cold sweat, especially with chest pain or trouble breathing    · You have any of the following signs of a stroke:      ¨ Numbness or drooping on one side of your face     ¨ Weakness in an arm or leg    ¨ Confusion or difficulty speaking    ¨ Dizziness, a severe headache, or vision loss  Seek care immediately if:  You have any of the following signs of a blood clot:  · You feel lightheaded, are short of breath, and have chest pain  · You cough up blood  · You have swelling, redness, pain, or warmth in your arm or leg  Contact your cardiologist or healthcare provider if:   · Your heart rate is higher than your healthcare provider said it should be  · You have new or worsening swelling in your legs, feet, ankles, or abdomen  · You are short of breath, even at rest      · You have questions or concerns about your condition or care  Medicines: You may need any of the following:  · Heart medicines  help control your heart rate and rhythm  You may need more than one medicine to treat your symptoms  · Blood thinners    help prevent blood clots  Examples of blood thinners include heparin and warfarin  Clots can cause strokes, heart attacks, and death   The following are general safety guidelines to follow while you are taking a blood thinner:    ¨ Watch for bleeding and bruising while you take blood thinners  Watch for bleeding from your gums or nose  Watch for blood in your urine and bowel movements  Use a soft washcloth on your skin, and a soft toothbrush to brush your teeth  This can keep your skin and gums from bleeding  If you shave, use an electric shaver  Do not play contact sports  ¨ Tell your dentist and other healthcare providers that you take anticoagulants  Wear a bracelet or necklace that says you take this medicine  ¨ Do not start or stop any medicines unless your healthcare provider tells you to  Many medicines cannot be used with blood thinners  ¨ Tell your healthcare provider right away if you forget to take the medicine, or if you take too much  ¨ Warfarin  is a blood thinner that you may need to take  The following are things you should be aware of if you take warfarin  § Foods and medicines can affect the amount of warfarin in your blood  Do not make major changes to your diet while you take warfarin  Warfarin works best when you eat about the same amount of vitamin K every day  Vitamin K is found in green leafy vegetables and certain other foods  Ask for more information about what to eat when you are taking warfarin  § You will need to see your healthcare provider for follow-up visits when you are on warfarin  You will need regular blood tests  These tests are used to decide how much medicine you need  · Antiplatelets , such as aspirin, help prevent blood clots  Take your antiplatelet medicine exactly as directed  These medicines make it more likely for you to bleed or bruise  If you are told to take aspirin, do not take acetaminophen or ibuprofen instead  · Take your medicine as directed  Contact your healthcare provider if you think your medicine is not helping or if you have side effects  Tell him or her if you are allergic to any medicine   Keep a list of the medicines, vitamins, and herbs you take  Include the amounts, and when and why you take them  Bring the list or the pill bottles to follow-up visits  Carry your medicine list with you in case of an emergency  Follow up with your cardiologist as directed: You will need regular blood tests and monitoring  Write down your questions so you remember to ask them during your visits  Manage A-fib:   · Know your target heart rate  Learn how to take your pulse and monitor your heart rate  · Manage other health conditions  This includes high blood pressure, sleep apnea, thyroid disease, diabetes, and other heart conditions  Take medicine as directed and follow your treatment plan  · Limit or do not drink alcohol  Alcohol can make a-fib hard to manage  Ask your healthcare provider if it is safe for you to drink alcohol  A drink of alcohol is 12 ounces of beer, 5 ounces of wine, or 1½ ounces of liquor  · Do not smoke  Nicotine and other chemicals in cigarettes and cigars can cause heart and lung damage  Ask your healthcare provider for information if you currently smoke and need help to quit  E-cigarettes or smokeless tobacco still contain nicotine  Talk to your healthcare provider before you use these products  · Eat heart-healthy foods  Heart healthy foods will help keep your cholesterol low  These include fruits, vegetables, whole-grain breads, low-fat dairy products, beans, lean meats, and fish  Replace butter and margarine with heart-healthy oils such as olive oil and canola oil  · Maintain a healthy weight  Ask your healthcare provider how much you should weigh  Ask him to help you create a weight loss plan if you are overweight  · Exercise for 30 minutes  most days of the week  Ask your healthcare provider about the best exercise plan for you  © 2017 [address] [employer] Information is for End User's use only and may not be sold, redistributed or otherwise used for commercial purposes   All illustrations and images included in CareNotes® are the copyrighted property of [employer]  or [employer]  The above information is an  only  It is not intended as medical advice for individual conditions or treatments  Talk to your doctor, nurse or pharmacist before following any medical regimen to see if it is safe and effective for you  Pneumonia   WHAT YOU NEED TO KNOW:   Pneumonia is an infection in your lungs caused by bacteria, viruses, fungi, or parasites  You can become infected if you come in contact with someone who is sick  You can get pneumonia if you recently had surgery or needed a ventilator to help you breathe  Pneumonia can also be caused by accidentally inhaling saliva or small pieces of food  Pneumonia may cause mild symptoms, or it can be severe and life-threatening  DISCHARGE INSTRUCTIONS:   Seek care immediately if:   · You cough up blood  · Your heart beats more than 100 beats in 1 minute  · You are very tired, confused, and cannot think clearly  · You have chest pain or trouble breathing  · Your lips or fingernails turn gray or blue  Contact your healthcare provider if:   · Your symptoms are the same or get worse 48 hours after you start antibiotics  · Your fever is not below 99°F (37 2°C) 48 hours after you start antibiotics  · You have a fever higher than 101°F (38 3°C)  · You cannot eat, or you have loss of appetite, nausea, or are vomiting  · You have questions or concerns about your condition or care  Medicines:   · Antibiotics  treat pneumonia caused by bacteria  · Acetaminophen  decreases pain and fever  It is available without a doctor's order  Ask how much to take and how often to take it  Follow directions  Read the labels of all other medicines you are using to see if they also contain acetaminophen, or ask your doctor or pharmacist  Acetaminophen can cause liver damage if not taken correctly   Do not use more than 4 grams (4,000 milligrams) total of acetaminophen in one day  · NSAIDs , such as ibuprofen, help decrease swelling, pain, and fever  This medicine is available with or without a doctor's order  NSAIDs can cause stomach bleeding or kidney problems in certain people  If you take blood thinner medicine, always ask your healthcare provider if NSAIDs are safe for you  Always read the medicine label and follow directions  · Take your medicine as directed  Contact your healthcare provider if you think your medicine is not helping or if you have side effects  Tell him or her if you are allergic to any medicine  Keep a list of the medicines, vitamins, and herbs you take  Include the amounts, and when and why you take them  Bring the list or the pill bottles to follow-up visits  Carry your medicine list with you in case of an emergency  Follow up with your healthcare provider as directed: You will need to return for more tests  Write down your questions so you remember to ask them during your visits  Manage your symptoms:   · Rest as needed  Rest often throughout the day  Alternate times of activity with times of rest     · Drink liquids as directed  Ask how much liquid to drink each day and which liquids are best for you  Liquids help thin your mucus, which may make it easier for you to cough it up  · Do not smoke  Avoid secondhand smoke  Smoking increases your risk for pneumonia  Smoking also makes it harder for you to get better after you have had pneumonia  Ask your healthcare provider for information if you need help to quit smoking  · Use a cool mist humidifier  A humidifier will help increase air moisture in your home  This may make it easier for you to breathe and help decrease your cough  · Keep your head elevated  You may be able to breathe better if you lie down with the head of your bed up  Prevent pneumonia:   · Prevent the spread of germs  Wash your hands often with soap and water  Use gel hand cleanser when there is no soap and water available  Do not touch your eyes, nose, or mouth unless you have washed your hands first  Cover your mouth when you cough  Cough into a tissue or your shirtsleeve so you do not spread germs from your hands  If you are sick, stay away from others as much as possible  · Limit alcohol  Women should limit alcohol to 1 drink a day  Men should limit alcohol to 2 drinks a day  A drink of alcohol is 12 ounces of beer, 5 ounces of wine, or 1½ ounces of liquor  · Ask about vaccines  You may need a vaccine to help prevent pneumonia  Get an influenza (flu) vaccine every year as soon as it becomes available  © 2017 [address] [employer] Information is for End User's use only and may not be sold, redistributed or otherwise used for commercial purposes  All illustrations and images included in CareNotes® are the copyrighted property of [employer]  or [employer]  The above information is an  only  It is not intended as medical advice for individual conditions or treatments  Talk to your doctor, nurse or pharmacist before following any medical regimen to see if it is safe and effective for you  Diltiazem (By mouth)   Diltiazem (cry-MXE-z-zem)  Treats high blood pressure and angina (chest pain)  This medicine is a calcium channel blocker  Brand Name(s): Cardizem, Cardizem CD, Cardizem LA, Cartia XT, Dilt-XR, Matzim LA, Taztia XT, Tiazac   There may be other brand names for this medicine  When This Medicine Should Not Be Used: This medicine is not right for everyone  Do not use it if you had an allergic reaction to diltiazem or similar medicines  How to Use This Medicine:   Long Acting Capsule, 12 Hour Capsule, 24 Hour Capsule, Tablet, Long Acting Tablet  · Take your medicine as directed  Your dose may need to be changed several times to find what works best for you    · It is best to take this medicine on an empty stomach  · Swallow this medicine whole  Do not crush, break, chew, or open the capsule or tablet  · Missed dose: Take a dose as soon as you remember  If it is almost time for your next dose, wait until then and take a regular dose  Do not take extra medicine to make up for a missed dose  · Store the medicine in a closed container at room temperature, away from heat, moisture, and direct light  Drugs and Foods to Avoid:   Ask your doctor or pharmacist before using any other medicine, including over-the-counter medicines, vitamins, and herbal products  · Some medicines can affect how diltiazem works  Tell your doctor if you are using the following:  ¨ Buspirone, carbamazepine, cimetidine, clonidine, cyclosporine, digoxin, ivabradine, lovastatin, midazolam, quinidine, rifampin, simvastatin, triazolam  ¨ Other blood pressure medicine, including a beta-blocker    Warnings While Using This Medicine:   · Tell your doctor if you are pregnant or breastfeeding, or if you have kidney disease, liver disease, or digestion problems  Tell your doctor about all heart problems that you have, including heart failure or rhythm problems  · This medicine may cause the following problems:  ¨ Slow heartbeat  ¨ Worsening of heart failure  ¨ Serious skin reactions  · This medicine could lower your blood pressure too much, especially when you first use it or if you are dehydrated  Stand or sit up slowly if you feel lightheaded or dizzy  Do not drive or do anything else that could be dangerous until you know how this medicine affects you  · Do not stop using this medicine without asking your doctor, even if you feel well  This medicine will not cure high blood pressure, but it will help keep it in normal range  You may have to take blood pressure medicine for the rest of your life  · Your doctor will do lab tests at regular visits to check on the effects of this medicine  Keep all appointments    · Keep all medicine out of the reach of children  Never share your medicine with anyone  Possible Side Effects While Using This Medicine:   Call your doctor right away if you notice any of these side effects:  · Allergic reaction: Itching or hives, swelling in your face or hands, swelling or tingling in your mouth or throat, chest tightness, trouble breathing  · Blistering, peeling, red skin rash  · Dark urine or pale stools, nausea, vomiting, loss of appetite, stomach pain, yellow skin or eyes  · Fast, slow, uneven, or pounding heartbeat  · Rapid weight gain, swelling in your hands, ankles, or feet  If you notice other side effects that you think are caused by this medicine, tell your doctor  Call your doctor for medical advice about side effects  You may report side effects to FDA at 2-124-FDA-4959  © 2017 [address] [employer] Information is for End User's use only and may not be sold, redistributed or otherwise used for commercial purposes  The above information is an  only  It is not intended as medical advice for individual conditions or treatments  Talk to your doctor, nurse or pharmacist before following any medical regimen to see if it is safe and effective for you  Metoprolol (By mouth)   Metoprolol (met-oh-PROE-lol)  Treats high blood pressure, angina (chest pain), and heart failure  May lower the risk of death after a heart attack  This medicine is a beta-blocker  Brand Name(s): Lopressor, Toprol XL   There may be other brand names for this medicine  When This Medicine Should Not Be Used: This medicine is not right for everyone  Do not use if you had an allergic reaction to metoprolol or similar medicines  Do not use this medicine if you have certain blood circulation or heart problems  Ask your doctor about these problems  How to Use This Medicine:   Tablet, Long Acting Tablet  · Take your medicine as directed  Your dose may need to be changed several times to find what works best for you    · Take this medicine with a meal or right after a meal  Take this medicine the same way every day, at the same time  · Swallow the tablet whole with a glass of water  You may break the extended-release tablet in half, but do not chew or crush it  · Missed dose: Take a dose as soon as you remember  If it is almost time for your next dose, wait until then and take a regular dose  Do not take extra medicine to make up for a missed dose  · Store the medicine in a closed container at room temperature, away from heat, moisture, and direct light  Drugs and Foods to Avoid:   Ask your doctor or pharmacist before using any other medicine, including over-the-counter medicines, vitamins, and herbal products  · Some medicines can affect how metoprolol works  Tell your doctor if you are taking any of the following:   ¨ Digoxin, dipyridamole, hydralazine, hydroxychloroquine, methyldopa, quinidine  ¨ Medicine to treat depression (such as bupropion, clomipramine, desipramine, fluoxetine, fluvoxamine, paroxetine, sertraline), medicine to treat mental illness (such as chlorpromazine, fluphenazine, haloperidol, thioridazine), medicine for heart rhythm problems (such as propafenone), HIV/AIDS medicine (such as ritonavir), medicine to treat a fungus infection (such as terbinafine), a monoamine oxidase inhibitor (MAOI), an ergot medicine for headaches, a calcium channel blocker (such as amlodipine, diltiazem, verapamil), or an alpha blocker (such as clonidine, prazosin, reserpine, guanethidine)  Warnings While Using This Medicine:   · Tell your doctor if you are pregnant or breastfeeding, or if you have blood vessel, heart, or circulation problems (such as heart failure, rhythm problems, or slow heartbeat)  Tell your doctor if you have kidney disease, liver disease, diabetes, lung disease (such as asthma), an overactive thyroid, or a history of allergies    · This medicine may cause worse symptoms of heart failure while the dose is being adjusted  · Do not stop using this medicine suddenly  Your doctor will need to slowly decrease your dose before you stop it completely  · Tell any doctor or dentist who treats you that you are using this medicine  You may need to stop using this medicine several days before you have surgery or medical tests  · This medicine could lower your blood pressure too much, especially when you first use it or if you are dehydrated  Stand or sit up slowly if you feel lightheaded or dizzy  · This medicine may make you dizzy or drowsy  Do not drive or do anything else that could be dangerous until you know how this medicine affects you  · Your doctor will check your progress and the effects of this medicine at regular visits  Keep all appointments  · Keep all medicine out of the reach of children  Never share your medicine with anyone  Possible Side Effects While Using This Medicine:   Call your doctor right away if you notice any of these side effects:  · Allergic reaction: Itching or hives, swelling in your face or hands, swelling or tingling in your mouth or throat, chest tightness, trouble breathing  · Lightheadedness, dizziness, or fainting  · Slow heartbeat  · Swelling in your hands, ankles, or feet, trouble breathing, tiredness  · Worsening chest pain  If you notice these less serious side effects, talk with your doctor:   · Diarrhea  · Mild dizziness or tiredness  If you notice other side effects that you think are caused by this medicine, tell your doctor  Call your doctor for medical advice about side effects  You may report side effects to FDA at 8-043-FDA-4106  © 2017 [address] Information is for End User's use only and may not be sold, redistributed or otherwise used for commercial purposes  The above information is an  only  It is not intended as medical advice for individual conditions or treatments   Talk to your doctor, nurse or pharmacist before following any medical regimen to see if it is safe and effective for you  Apixaban (By mouth)   Apixaban (a-PIX-a-ban)  Treats and prevents blood clots  This medicine is a blood thinner  Brand Name(s): Eliquis   There may be other brand names for this medicine  When This Medicine Should Not Be Used: This medicine is not right for everyone  Do not use it if you had an allergic reaction to apixaban or you have active bleeding  How to Use This Medicine:   Tablet  · Your doctor will tell you how much medicine to use  Do not use more than directed  · If you are not able to swallow the tablets whole, they may be crushed and mixed in water, 5% dextrose in water (D5W), apple juice, or applesauce  The crushed tablets may be mixed with 60 mL of water or D5W dose and given through a nasogastric tube (NGT)  · This medicine should come with a Medication Guide  Ask your pharmacist for a copy if you do not have one  · Missed dose: Take a dose as soon as you remember  If it is almost time for your next dose, wait until then and take a regular dose  Do not take extra medicine to make up for a missed dose  · Store the medicine in a closed container at room temperature, away from heat, moisture, and direct light  Drugs and Foods to Avoid:   Ask your doctor or pharmacist before using any other medicine, including over-the-counter medicines, vitamins, and herbal products  · Some medicines can affect how apixaban works   Tell your doctor if you are using any of the following:   ¨ Carbamazepine, clarithromycin, itraconazole, ketoconazole, phenytoin, rifampin, ritonavir, Kari's wort  ¨ Blood thinner (including clopidogrel, heparin, prasugrel, warfarin)  ¨ Medicine to treat depression  ¨ NSAID pain or arthritis medicine (including aspirin, celecoxib, diclofenac, ibuprofen, naproxen)  Warnings While Using This Medicine:   · Tell your doctor if you are pregnant or breastfeeding, or if you have kidney disease, liver disease, bleeding problems, or an artificial heart valve  · Do not stop using this medicine suddenly without asking your doctor  You might have a higher risk of stroke for a short time after you stop using this medicine  · This medicine increases your risk for bleeding that can become serious if not controlled  You may also bruise easily, and it may take longer than usual for bleeding to stop  · This medicine may increase your risk for blood clots in your spine or back if you undergo an epidural or spinal puncture  This could lead to paralysis  Tell your doctor if you ever had spine problems or back surgery  · Tell any doctor or dentist who treats you that you are using this medicine  With your doctor's supervision, you may need to stop using this medicine several days before you have surgery or medical tests  · Your doctor will do lab tests at regular visits to check on the effects of this medicine  Keep all appointments  · Keep all medicine out of the reach of children  Never share your medicine with anyone  Possible Side Effects While Using This Medicine:   Call your doctor right away if you notice any of these side effects:  · Allergic reaction: Itching or hives, swelling in your face or hands, swelling or tingling in your mouth or throat, chest tightness, trouble breathing  · Change in how much or how often you urinate, red or pink urine  · Chest pain, trouble breathing  · Coughing up blood, vomiting blood or material that looks like coffee grounds  · Numbness, tingling, or muscle weakness in your legs or feet  · Red or black, tarry stools  · Unusual bleeding, bruising, or weakness  If you notice other side effects that you think are caused by this medicine, tell your doctor  Call your doctor for medical advice about side effects   You may report side effects to FDA at 2-504-FDA-2602  © 2017 [address] [employer] Information is for End User's use only and may not be sold, redistributed or otherwise used for commercial purposes  The above information is an  only  It is not intended as medical advice for individual conditions or treatments  Talk to your doctor, nurse or pharmacist before following any medical regimen to see if it is safe and effective for you  Levofloxacin (By mouth)   Levofloxacin (khz-pen-FLPD-a-sin)  Treats infections  This medicine is a quinolone antibiotic  Brand Name(s): Levaquin   There may be other brand names for this medicine  When This Medicine Should Not Be Used: This medicine is not right for everyone  Do not use it if you had an allergic reaction to levofloxacin or to similar medicines  How to Use This Medicine:   Liquid, Tablet  · Your doctor will tell you how much medicine to use  Do not use more than directed  Take your medicine at the same time each day  · Tablet: Take it with or without food  · Liquid: Take it 1 hour before or 2 hours after you eat  Measure the oral liquid medicine with a marked measuring spoon, oral syringe, or medicine cup  · Take all of the medicine in your prescription to clear up your infection, even if you feel better after the first few doses  · Drink extra fluids so you will urinate more often and help prevent kidney problems  · This medicine should come with a Medication Guide  Ask your pharmacist for a copy if you do not have one  · Missed dose: Take a dose as soon as you remember  If it is almost time for your next dose, wait until then and take a regular dose  Do not take extra medicine to make up for a missed dose  · Store the medicine in a closed container at room temperature, away from heat, moisture, and direct light  Drugs and Foods to Avoid:   Ask your doctor or pharmacist before using any other medicine, including over-the-counter medicines, vitamins, and herbal products  · Some foods and medicines can affect how levofloxacin works   Tell your doctor if you are using any of the following:  ¨ Theophylline  ¨ Blood thinner (including warfarin)  ¨ Diabetes medicine  ¨ Medicine for heart rhythm problems (including amiodarone, procainamide, quinidine, sotalol)  ¨ NSAID pain or arthritis medicine (including aspirin, celecoxib, diclofenac, ibuprofen, naproxen)  ¨ Steroid medicine (including hydrocortisone, methylprednisolone, prednisone)  · Take levofloxacin at least 2 hours before or 2 hours after you take antacids that contain magnesium or aluminum, zinc, or iron supplements, sucralfate, or didanosine  Warnings While Using This Medicine:   · Tell your doctor if you are pregnant or breastfeeding, or if you have kidney disease, liver disease, diabetes, heart disease, myasthenia gravis, or a history of heart rhythm problems (such as QT prolongation) or seizures  Tell your doctor if you have ever had tendon or joint problems, including rheumatoid arthritis, or if you have received a transplant  · This medicine may cause the following problems:  ¨ Tendinitis and tendon rupture (may happen after treatment ends)  ¨ Liver damage  ¨ Nerve damage in the arms or legs  ¨ Heart rhythm changes  ¨ Changes in blood sugar levels  · This medicine may make you feel dizzy or lightheaded  Do not drive or do anything else that could be dangerous until you know how this medicine affects you  · This medicine can cause diarrhea  Call your doctor if the diarrhea becomes severe, does not stop, or is bloody  Do not take any medicine to stop diarrhea until you have talked to your doctor  Diarrhea can occur 2 months or more after you stop taking this medicine  · Tell any doctor or dentist who treats you that you are using this medicine  This medicine may affect certain medical test results  · This medicine may make your skin more sensitive to sunlight  Wear sunscreen  Do not use sunlamps or tanning beds  · Call your doctor if your symptoms do not improve or if they get worse    · Keep all medicine out of the reach of children  Never share your medicine with anyone  Possible Side Effects While Using This Medicine:   Call your doctor right away if you notice any of these side effects:  · Allergic reaction: Itching or hives, swelling in your face or hands, swelling or tingling in your mouth or throat, chest tightness, trouble breathing  · Blistering, peeling, red skin rash  · Change in how much or how often you urinate  · Dark urine or pale stools, nausea, vomiting, loss of appetite, stomach pain, yellow skin or eyes  · Diarrhea that may contain blood  · Fainting, dizziness, or lightheadedness  · Fast, slow, or uneven heartbeat, chest pain  · Numbness, tingling, or burning pain in your hands, arms, legs, or feet  · Pain, stiffness, swelling, or bruises around your ankle, leg, shoulder, or other joint  · Seizures, severe headache, unusual thoughts or behaviors, trouble sleeping, feeling anxious, confused, or depressed, seeing, hearing, or feeling things that are not there  · Unusual bleeding, bruising, or weakness  If you notice these less serious side effects, talk with your doctor:   · Mild headache or nausea  If you notice other side effects that you think are caused by this medicine, tell your doctor  Call your doctor for medical advice about side effects  You may report side effects to FDA at 0-273-FDA-3863  © 2017 [address] [employer] Information is for End User's use only and may not be sold, redistributed or otherwise used for commercial purposes  The above information is an  only  It is not intended as medical advice for individual conditions or treatments  Talk to your doctor, nurse or pharmacist before following any medical regimen to see if it is safe and effective for you

## 2019-08-29 ENCOUNTER — APPOINTMENT (INPATIENT)
Dept: RADIOLOGY | Facility: HOSPITAL | Age: 69
DRG: 871 | End: 2019-08-29
Payer: COMMERCIAL

## 2019-08-29 ENCOUNTER — HOSPITAL ENCOUNTER (INPATIENT)
Facility: HOSPITAL | Age: 69
LOS: 2 days | Discharge: HOME | DRG: 308 | End: 2019-08-31
Attending: INTERNAL MEDICINE | Admitting: HOSPITALIST
Payer: COMMERCIAL

## 2019-08-29 VITALS
DIASTOLIC BLOOD PRESSURE: 83 MMHG | BODY MASS INDEX: 53.81 KG/M2 | RESPIRATION RATE: 18 BRPM | OXYGEN SATURATION: 93 % | SYSTOLIC BLOOD PRESSURE: 125 MMHG | TEMPERATURE: 97.9 F | HEART RATE: 75 BPM | WEIGHT: 285 LBS | HEIGHT: 61 IN

## 2019-08-29 DIAGNOSIS — E66.01 MORBID OBESITY (HCC): ICD-10-CM

## 2019-08-29 DIAGNOSIS — J96.01 ACUTE RESPIRATORY FAILURE WITH HYPOXIA (HCC): ICD-10-CM

## 2019-08-29 DIAGNOSIS — I10 HYPERTENSION: ICD-10-CM

## 2019-08-29 DIAGNOSIS — I48.91 ATRIAL FIBRILLATION WITH RAPID VENTRICULAR RESPONSE (HCC): ICD-10-CM

## 2019-08-29 LAB
ANION GAP SERPL CALCULATED.3IONS-SCNC: 7 MMOL/L (ref 4–13)
BASOPHILS # BLD AUTO: 0 THOUSANDS/ΜL (ref 0–0.1)
BASOPHILS NFR BLD AUTO: 1 % (ref 0–2)
BUN SERPL-MCNC: 24 MG/DL (ref 7–25)
CALCIUM SERPL-MCNC: 8.8 MG/DL (ref 8.6–10.5)
CHLORIDE SERPL-SCNC: 105 MMOL/L (ref 98–107)
CO2 SERPL-SCNC: 28 MMOL/L (ref 21–31)
CREAT SERPL-MCNC: 0.76 MG/DL (ref 0.6–1.2)
EOSINOPHIL # BLD AUTO: 0.2 THOUSAND/ΜL (ref 0–0.61)
EOSINOPHIL NFR BLD AUTO: 2 % (ref 0–5)
ERYTHROCYTE [DISTWIDTH] IN BLOOD BY AUTOMATED COUNT: 13.4 % (ref 11.5–14.5)
GFR SERPL CREATININE-BSD FRML MDRD: 80 ML/MIN/1.73SQ M
GLUCOSE SERPL-MCNC: 125 MG/DL (ref 65–99)
HCT VFR BLD AUTO: 36.1 % (ref 42–47)
HGB BLD-MCNC: 12.1 G/DL (ref 12–16)
LYMPHOCYTES # BLD AUTO: 1.2 THOUSANDS/ΜL (ref 0.6–4.47)
LYMPHOCYTES NFR BLD AUTO: 13 % (ref 21–51)
MAGNESIUM SERPL-MCNC: 2.3 MG/DL (ref 1.9–2.7)
MCH RBC QN AUTO: 30.2 PG (ref 26–34)
MCHC RBC AUTO-ENTMCNC: 33.6 G/DL (ref 31–37)
MCV RBC AUTO: 90 FL (ref 81–99)
MONOCYTES # BLD AUTO: 0.9 THOUSAND/ΜL (ref 0.17–1.22)
MONOCYTES NFR BLD AUTO: 10 % (ref 2–12)
NEUTROPHILS # BLD AUTO: 6.6 THOUSANDS/ΜL (ref 1.4–6.5)
NEUTS SEG NFR BLD AUTO: 74 % (ref 42–75)
PLATELET # BLD AUTO: 294 THOUSANDS/UL (ref 149–390)
PMV BLD AUTO: 7.9 FL (ref 8.6–11.7)
POTASSIUM SERPL-SCNC: 3.4 MMOL/L (ref 3.5–5.5)
RBC # BLD AUTO: 4.01 MILLION/UL (ref 3.9–5.2)
SODIUM SERPL-SCNC: 140 MMOL/L (ref 134–143)
WBC # BLD AUTO: 8.9 THOUSAND/UL (ref 4.8–10.8)

## 2019-08-29 PROCEDURE — 85025 COMPLETE CBC W/AUTO DIFF WBC: CPT | Performed by: HOSPITALIST

## 2019-08-29 PROCEDURE — 99223 1ST HOSP IP/OBS HIGH 75: CPT | Performed by: PHYSICIAN ASSISTANT

## 2019-08-29 PROCEDURE — 83735 ASSAY OF MAGNESIUM: CPT | Performed by: STUDENT IN AN ORGANIZED HEALTH CARE EDUCATION/TRAINING PROGRAM

## 2019-08-29 PROCEDURE — 71045 X-RAY EXAM CHEST 1 VIEW: CPT

## 2019-08-29 PROCEDURE — 99220 PR INITIAL OBSERVATION CARE/DAY 70 MINUTES: CPT | Performed by: HOSPITALIST

## 2019-08-29 PROCEDURE — 99233 SBSQ HOSP IP/OBS HIGH 50: CPT | Performed by: STUDENT IN AN ORGANIZED HEALTH CARE EDUCATION/TRAINING PROGRAM

## 2019-08-29 PROCEDURE — 99231 SBSQ HOSP IP/OBS SF/LOW 25: CPT | Performed by: INTERNAL MEDICINE

## 2019-08-29 PROCEDURE — 97530 THERAPEUTIC ACTIVITIES: CPT

## 2019-08-29 PROCEDURE — 97116 GAIT TRAINING THERAPY: CPT

## 2019-08-29 PROCEDURE — 80048 BASIC METABOLIC PNL TOTAL CA: CPT | Performed by: HOSPITALIST

## 2019-08-29 PROCEDURE — 94760 N-INVAS EAR/PLS OXIMETRY 1: CPT

## 2019-08-29 RX ORDER — ACETAMINOPHEN 325 MG/1
650 TABLET ORAL EVERY 6 HOURS PRN
Status: CANCELLED | OUTPATIENT
Start: 2019-08-29

## 2019-08-29 RX ORDER — LEVOFLOXACIN 750 MG/1
750 TABLET ORAL EVERY 24 HOURS
Status: COMPLETED | OUTPATIENT
Start: 2019-08-30 | End: 2019-08-31

## 2019-08-29 RX ORDER — LANOLIN ALCOHOL/MO/W.PET/CERES
3 CREAM (GRAM) TOPICAL
Status: CANCELLED | OUTPATIENT
Start: 2019-08-29

## 2019-08-29 RX ORDER — ATORVASTATIN CALCIUM 40 MG/1
40 TABLET, FILM COATED ORAL
Status: DISCONTINUED | OUTPATIENT
Start: 2019-08-29 | End: 2019-08-31 | Stop reason: HOSPADM

## 2019-08-29 RX ORDER — POTASSIUM CHLORIDE 14.9 MG/ML
20 INJECTION INTRAVENOUS
Status: DISCONTINUED | OUTPATIENT
Start: 2019-08-29 | End: 2019-08-29

## 2019-08-29 RX ORDER — POTASSIUM CHLORIDE 20 MEQ/1
40 TABLET, EXTENDED RELEASE ORAL 2 TIMES DAILY
Status: COMPLETED | OUTPATIENT
Start: 2019-08-29 | End: 2019-08-29

## 2019-08-29 RX ORDER — FUROSEMIDE 10 MG/ML
40 INJECTION INTRAMUSCULAR; INTRAVENOUS
Status: DISCONTINUED | OUTPATIENT
Start: 2019-08-29 | End: 2019-08-30

## 2019-08-29 RX ORDER — ACETAMINOPHEN 325 MG/1
650 TABLET ORAL EVERY 6 HOURS PRN
Status: DISCONTINUED | OUTPATIENT
Start: 2019-08-29 | End: 2019-08-31 | Stop reason: HOSPADM

## 2019-08-29 RX ORDER — METOPROLOL SUCCINATE 50 MG/1
100 TABLET, EXTENDED RELEASE ORAL EVERY 12 HOURS
Status: DISCONTINUED | OUTPATIENT
Start: 2019-08-29 | End: 2019-08-30

## 2019-08-29 RX ORDER — FUROSEMIDE 10 MG/ML
40 INJECTION INTRAMUSCULAR; INTRAVENOUS
Status: CANCELLED | OUTPATIENT
Start: 2019-08-29

## 2019-08-29 RX ORDER — ONDANSETRON 2 MG/ML
4 INJECTION INTRAMUSCULAR; INTRAVENOUS EVERY 6 HOURS PRN
Status: CANCELLED | OUTPATIENT
Start: 2019-08-29

## 2019-08-29 RX ORDER — ESCITALOPRAM OXALATE 10 MG/1
20 TABLET ORAL DAILY
Status: CANCELLED | OUTPATIENT
Start: 2019-08-30

## 2019-08-29 RX ORDER — LEVOFLOXACIN 750 MG/1
750 TABLET ORAL EVERY 24 HOURS
Status: DISCONTINUED | OUTPATIENT
Start: 2019-08-30 | End: 2019-08-29 | Stop reason: HOSPADM

## 2019-08-29 RX ORDER — ESCITALOPRAM OXALATE 20 MG/1
20 TABLET ORAL DAILY
Status: DISCONTINUED | OUTPATIENT
Start: 2019-08-30 | End: 2019-08-31 | Stop reason: HOSPADM

## 2019-08-29 RX ORDER — DILTIAZEM HYDROCHLORIDE 60 MG/1
120 TABLET, FILM COATED ORAL EVERY 8 HOURS SCHEDULED
Status: CANCELLED | OUTPATIENT
Start: 2019-08-29

## 2019-08-29 RX ORDER — DOCUSATE SODIUM 100 MG/1
100 CAPSULE, LIQUID FILLED ORAL 2 TIMES DAILY
Status: DISCONTINUED | OUTPATIENT
Start: 2019-08-29 | End: 2019-08-31 | Stop reason: HOSPADM

## 2019-08-29 RX ORDER — LEVOFLOXACIN 750 MG/1
750 TABLET ORAL EVERY 24 HOURS
Status: CANCELLED | OUTPATIENT
Start: 2019-08-30 | End: 2019-09-01

## 2019-08-29 RX ORDER — DIPHENHYDRAMINE HCL 25 MG
25 TABLET ORAL
Status: DISCONTINUED | OUTPATIENT
Start: 2019-08-29 | End: 2019-08-31 | Stop reason: HOSPADM

## 2019-08-29 RX ORDER — DILTIAZEM HYDROCHLORIDE 5 MG/ML
15 INJECTION INTRAVENOUS ONCE
Status: COMPLETED | OUTPATIENT
Start: 2019-08-29 | End: 2019-08-29

## 2019-08-29 RX ORDER — POTASSIUM CHLORIDE 20 MEQ/1
40 TABLET, EXTENDED RELEASE ORAL 2 TIMES DAILY
Status: CANCELLED | OUTPATIENT
Start: 2019-08-29 | End: 2019-08-30

## 2019-08-29 RX ORDER — DILTIAZEM HYDROCHLORIDE 60 MG/1
120 TABLET, FILM COATED ORAL EVERY 8 HOURS SCHEDULED
Status: DISCONTINUED | OUTPATIENT
Start: 2019-08-29 | End: 2019-08-30

## 2019-08-29 RX ORDER — METOPROLOL TARTRATE 5 MG/5ML
5 INJECTION INTRAVENOUS EVERY 6 HOURS PRN
Status: DISCONTINUED | OUTPATIENT
Start: 2019-08-29 | End: 2019-08-31 | Stop reason: HOSPADM

## 2019-08-29 RX ORDER — ONDANSETRON 2 MG/ML
4 INJECTION INTRAMUSCULAR; INTRAVENOUS EVERY 6 HOURS PRN
Status: DISCONTINUED | OUTPATIENT
Start: 2019-08-29 | End: 2019-08-31 | Stop reason: HOSPADM

## 2019-08-29 RX ORDER — POTASSIUM CHLORIDE 20 MEQ/1
40 TABLET, EXTENDED RELEASE ORAL 2 TIMES DAILY
Status: DISCONTINUED | OUTPATIENT
Start: 2019-08-29 | End: 2019-08-29 | Stop reason: HOSPADM

## 2019-08-29 RX ORDER — DOCUSATE SODIUM 100 MG/1
100 CAPSULE, LIQUID FILLED ORAL 2 TIMES DAILY
Status: CANCELLED | OUTPATIENT
Start: 2019-08-29

## 2019-08-29 RX ORDER — DIPHENHYDRAMINE HCL 25 MG
25 TABLET ORAL
Status: CANCELLED | OUTPATIENT
Start: 2019-08-29

## 2019-08-29 RX ORDER — DIGOXIN 125 MCG
125 TABLET ORAL DAILY
Status: DISCONTINUED | OUTPATIENT
Start: 2019-08-29 | End: 2019-08-29 | Stop reason: HOSPADM

## 2019-08-29 RX ORDER — METOPROLOL TARTRATE 5 MG/5ML
5 INJECTION INTRAVENOUS EVERY 6 HOURS PRN
Status: CANCELLED | OUTPATIENT
Start: 2019-08-29

## 2019-08-29 RX ORDER — LANOLIN ALCOHOL/MO/W.PET/CERES
3 CREAM (GRAM) TOPICAL
Status: DISCONTINUED | OUTPATIENT
Start: 2019-08-29 | End: 2019-08-31 | Stop reason: HOSPADM

## 2019-08-29 RX ORDER — METOPROLOL SUCCINATE 50 MG/1
100 TABLET, EXTENDED RELEASE ORAL EVERY 12 HOURS
Status: CANCELLED | OUTPATIENT
Start: 2019-08-29

## 2019-08-29 RX ORDER — DIGOXIN 125 MCG
125 TABLET ORAL DAILY
Status: DISCONTINUED | OUTPATIENT
Start: 2019-08-30 | End: 2019-08-30

## 2019-08-29 RX ORDER — ATORVASTATIN CALCIUM 40 MG/1
40 TABLET, FILM COATED ORAL
Status: CANCELLED | OUTPATIENT
Start: 2019-08-29

## 2019-08-29 RX ORDER — DIGOXIN 125 MCG
125 TABLET ORAL DAILY
Status: CANCELLED | OUTPATIENT
Start: 2019-08-30

## 2019-08-29 RX ADMIN — DILTIAZEM HYDROCHLORIDE 120 MG: 60 TABLET, FILM COATED ORAL at 06:04

## 2019-08-29 RX ADMIN — DILTIAZEM HYDROCHLORIDE 15 MG: 5 INJECTION INTRAVENOUS at 01:49

## 2019-08-29 RX ADMIN — POTASSIUM CHLORIDE 40 MEQ: 1500 TABLET, EXTENDED RELEASE ORAL at 17:57

## 2019-08-29 RX ADMIN — ATORVASTATIN CALCIUM 40 MG: 40 TABLET, FILM COATED ORAL at 17:57

## 2019-08-29 RX ADMIN — APIXABAN 5 MG: 5 TABLET, FILM COATED ORAL at 10:16

## 2019-08-29 RX ADMIN — DIGOXIN 125 MCG: 125 TABLET ORAL at 12:58

## 2019-08-29 RX ADMIN — DILTIAZEM HYDROCHLORIDE 120 MG: 60 TABLET, FILM COATED ORAL at 21:19

## 2019-08-29 RX ADMIN — FUROSEMIDE 40 MG: 10 INJECTION, SOLUTION INTRAMUSCULAR; INTRAVENOUS at 06:04

## 2019-08-29 RX ADMIN — METOPROLOL SUCCINATE 100 MG: 50 TABLET, EXTENDED RELEASE ORAL at 10:17

## 2019-08-29 RX ADMIN — DILTIAZEM HYDROCHLORIDE 120 MG: 60 TABLET, FILM COATED ORAL at 13:00

## 2019-08-29 RX ADMIN — FUROSEMIDE 40 MG: 10 INJECTION, SOLUTION INTRAMUSCULAR; INTRAVENOUS at 11:47

## 2019-08-29 RX ADMIN — POTASSIUM CHLORIDE 40 MEQ: 1500 TABLET, EXTENDED RELEASE ORAL at 10:16

## 2019-08-29 RX ADMIN — ESCITALOPRAM OXALATE 20 MG: 10 TABLET ORAL at 10:16

## 2019-08-29 RX ADMIN — MELATONIN 3 MG: 3 TAB ORAL at 21:19

## 2019-08-29 RX ADMIN — FUROSEMIDE 40 MG: 10 INJECTION, SOLUTION INTRAMUSCULAR; INTRAVENOUS at 17:57

## 2019-08-29 RX ADMIN — APIXABAN 5 MG: 5 TABLET, FILM COATED ORAL at 17:57

## 2019-08-29 NOTE — PLAN OF CARE
Problem: PHYSICAL THERAPY ADULT  Goal: Performs mobility at highest level of function for planned discharge setting  See evaluation for individualized goals  Description  Treatment/Interventions: Functional transfer training, LE strengthening/ROM, Therapeutic exercise, Endurance training, Bed mobility, Gait training, Equipment eval/education, Spoke to nursing, OT(&neuro re-education w/balance training)  Equipment Recommended: Walker(will monitor progress to determine short term need for AD)    See flowsheet documentation for full assessment, interventions and recommendations  Jaylene Koyanagi, PT     Outcome: Progressing  Note:   Prognosis: Good  Problem List: Decreased strength, Decreased endurance, Impaired balance, Decreased mobility, Decreased safety awareness, Obesity  Assessment: Pt seen for PT treatment session this date with interventions consisting of gait training w/ emphasis on improving pt's ability to ambulate level surfaces x 15 feet with min A provided by therapist with RW and therapeutic activity consisting of training: bed mobility, supine<>sit transfers, sit<>stand transfers, static sitting tolerance at EOB for 5 minutes w/ B UE support, static standing tolerance for 3 minutes w/ B UE support, vc and tactile cues for static sitting posture faciliation, vc and tactile cues for static standing posture faciliation and stand pivot transfers towards L direction  Pt agreeable to PT treatment session upon arrival, pt found supine in bed w/ HOB elevated, in no apparent distress and A&O x 4  In comparison to previous session, pt with improvements in dynamic standing balance  Post session: all needs in reach and RN notified of session findings/recommendations Continue to recommend STR at time of d/c in order to maximize pt's functional independence and safety w/ mobility   Pt continues to be functioning below baseline level, and remains limited 2* factors listed above and including weakness, decreased endurance w/O2 usage, decreased activity tolerance, gait dysfunction, impaired balance, decline from PLOF  PT will continue to see pt while here in order to address the deficits listed above and provide interventions consistent w/ POC in effort to achieve LTGs  Barriers to Discharge: Decreased caregiver support  Barriers to Discharge Comments: pt  lives alone  Recommendation: Short-term skilled PT     PT - OK to Discharge: Yes(if medically stable to STR)    See flowsheet documentation for full assessment     Baljit Solorzano, PT

## 2019-08-29 NOTE — PROGRESS NOTES
Progress Note - Elio Alexander 1950, 78 y o  female MRN: [id number]    Unit/Bed#: ICU 03 Encounter: [id number]    Primary Care Provider: Mya Lawrence MD   Date and time admitted to hospital: 8/26/2019  9:27 AM        * Sepsis due to pneumonia [facility])  Assessment & Plan  · CTA chest with multifocal pneumonia - however patient's clinical history is not completely convincing for a pneumonia  · Urine for Streptococcus pneumoniae and Legionella antigen is negative  · Blood cultures negative thus far  · C reactive protein elevated, procalcitonin testing within normal limits  · Status post IR eval for thoracentesis, not indicated at this time  · Continue levofloxacin for 1 more day at which point the patient will have completed a 5 day course of antibiotics  · No further workup    At[facility] fibrillation with rapid [facility] response ([facility] )  Assessment & Plan  · This is new onset atrial fibrillation  · Status post a Cardiology evaluation  · Status post a Cardizem drip  · Continue current dosing of Cardizem and metoprolol for rate control  · Continue apixaban for anticoagulation purposes    Acute respiratory failure with hypoxia ([facility] )  Assessment & Plan  · Oxygen requirements have improved  · Secondary to suspected diastolic dysfunction CHF with an acute exacerbation as well as a community-acquired pneumonia  · Patient has diuresed approximately 8 lb - highest weight on this admission was 293 lb, current weight is 285 lb  · Cardiology are following  · Lasix is currently dosed at 40 mg IV t i d    · Await further Cardiology recommendation  · Supplement potassium for the hypokalemia    Morbid obesity ([facility] )  Assessment & Plan  · BMI of 53 85  · Dietary, weight loss, and lifestyle modification counseling provided  ·  Supportive care  · Status post a nutrition eval    Essential hypertension  Assessment & Plan  · Continue metoprolol and Cardizem for now    Major depressive disorder in full remission [facility])  Assessment & Plan  · Continue Lexapro        VTE Pharmacologic Prophylaxis: Pharmacologic: Apixaban (Eliquis)    Patient Centered Rounds: I have performed bedside rounds with nursing staff today  Discussions with Specialists or Other Care Team Provider:  Cardiology, Critical Care, case management, nursing and pharmacy  Education and Discussions with Family / Patient:  Patient was brought up to par with the plan of care for today, all questions answered to her satisfaction    Current Length of Stay: 3 day(s)    Current Patient Status: Inpatient   Certification Statement: The patient will continue to require additional inpatient hospital stay due to The need for continued IV diuresis    Discharge Plan:  Hopeful discharge planning for 24 hours from now    Code Status: Level 1 - Full Code    Subjective:   Patient seen and examined  Feels like she is back to normal   Denies pain or discomfort  Denies shortness of breath  Objective:     Vitals:   Temp (24hrs), Av 7 °F (36 5 °C), Min:97 °F (36 1 °C), Max:98 3 °F (36 8 °C)    Temp:  [97 °F (36 1 °C)-98 3 °F (36 8 °C)] 97 7 °F (36 5 °C)  HR:  [109-131] 127  Resp:  [12-32] 12  BP: (112-147)/(61-90) 143/70  SpO2:  [87 %-92 %] 92 %  Body mass index is 53 85 kg/m²  Input and Output Summary (last 24 hours): Intake/Output Summary (Last 24 hours) at 2019 3636  Last data filed at 2019 0600  Gross per 24 hour   Intake 340 ml   Output 3050 ml   Net -2710 ml       Physical Exam:     Physical Exam   Constitutional: She is oriented to person, place, and time  She appears well-developed and well-nourished  HENT:   Head: Normocephalic and atraumatic  Nose: Nose normal    Mouth/Throat: Oropharynx is clear and moist    Eyes: Pupils are equal, round, and reactive to light  Conjunctivae and EOM are normal    Neck: Normal range of motion  Neck supple  No JVD present  No thyromegaly present  Cardiovascular: Normal rate, regular rhythm and intact distal pulses   Exam reveals no gallop and no friction rub  No murmur heard  Pulmonary/Chest: Effort normal and breath sounds normal  No respiratory distress  Abdominal: Soft  Bowel sounds are normal  She exhibits no distension and no mass  There is no tenderness  There is no guarding  Musculoskeletal: Normal range of motion  She exhibits no edema  Lymphadenopathy:     She has no cervical adenopathy  Neurological: She is alert and oriented to person, place, and time  No cranial nerve deficit  Skin: Skin is warm  No rash noted  No erythema  Psychiatric: She has a normal mood and affect  Her behavior is normal    Vitals reviewed  Additional Data:     Labs:    Results from last 7 days   Lab Units 08/29/19  0452   WBC Thousand/uL 8 90   HEMOGLOBIN g/dL 12 1   HEMATOCRIT % 36 1*   PLATELETS Thousands/uL 294   NEUTROS PCT % 74   LYMPHS PCT % 13*   MONOS PCT % 10   EOS PCT % 2     Results from last 7 days   Lab Units 08/29/19  0452  08/27/19  0503   POTASSIUM mmol/L 3 4*   < > 3 7   CHLORIDE mmol/L 105   < > 108*   CO2 mmol/L 28   < > 22   BUN mg/dL 24   < > 21   CREATININE mg/dL 0 76   < > 0 75   CALCIUM mg/dL 8 8   < > 8 7   ALK PHOS U/L  --   --  116   ALT U/L  --   --  22   AST U/L  --   --  18    < > = values in this interval not displayed  Results from last 7 days   Lab Units 08/26/19  0918   INR  1 26               * I Have Reviewed All Lab Data Listed Above  * Additional Pertinent Lab Tests Reviewed: [facility]  Reviewed    Imaging:  Imaging Reports Reviewed Today Include:  None    Recent Cultures (last 7 days):     Results from last 7 days   Lab Units 08/26/19  1526 08/26/19  1239 08/26/19  1235 08/26/19  1112   BLOOD CULTURE   --  No Growth at 48 hrs  No Growth at 48 hrs    --    LEGIONELLA URINARY ANTIGEN  Negative  --   --  Negative       Last 24 Hours Medication List:     Current Facility-Administered Medications:  acetaminophen 650 mg Oral Q6H PRN Rashaun Zarate MD   apixaban 5 mg Oral BID Wallace Hogan PA-C   atorvastatin 40 mg Oral Daily With Gracie Aceves MD   diltiazem 120 mg Oral Q8H [address] AYO Golden   diphenhydrAMINE 25 mg Oral HS PRN Osmin Ross MD   docusate sodium 100 mg Oral BID Lester Colby MD   escitalopram 20 mg Oral Daily Diana Matute MD   furosemide 40 mg Intravenous TID (diuretic) Dwight Hayward MD   [START ON 8/30/2019] levofloxacin 750 mg Oral Q24H Lester Colby MD   melatonin 3 mg Oral HS AYO Golden   metoprolol 5 mg Intravenous Q6H PRN Dwight Hayward MD   metoprolol succinate 100 mg Oral Q12H Dwight Hayward MD   ondansetron 4 mg Intravenous Q6H PRN Diana Matute MD   potassium chloride 40 mEq Oral BID Lester Colby MD   potassium chloride 20 mEq Intravenous Naida Avilez MD        Today, Patient Was Seen By: Rosana Perales MD    ** Please Note: Dictation voice to text software may have been used in the creation of this document   **

## 2019-08-29 NOTE — PROGRESS NOTES
Critical Care Interval Progress Note     Kelsie Conroy 78 y o  female MRN: [id number]    Unit/Bed#: ICU 03 Encounter: [id number]    Impression:  Principal Problem:    Sepsis due to pneumonia [facility])  Active Problems:    Acute respiratory failure with hypoxia ([facility] )    Atrial fibrillation with rapid ventricular response ([facility] )    Essential hypertension    Morbid obesity ([facility] )    Major depressive disorder in full remission ([facility] )  Resolved Problems:    * No resolved hospital problems  *    Ms Burnetta Gitelman is a 77 yo woman with PMH of HTN and super morbid obesity who was admitted 8/26 with new onset Afib with RVR and acute hypoxic respiratory failure likely 2/2 diastolic dysfunction  Improving with diuresis, overall net negative approximately 5L since admission  Plan:  Neuro:   No active issues, continue delirium precautions  Melatonin ordered for sleep aid  Did have CT/MRI brain earlier this year during work-up of syncope which were normal per patient report      CV:   New onset atrial fibrillation with RVR:  Rate control is improved since admission but still largely in 120s with intermittent spikes into 130s and 140s  Appreciate cardiology recommendations  Currently on diltiazem 120 mg PO Q8H as well as metoprolol 100 mg BID  Have discussed SUSIE/cardioversion with patient, however more likely to have sustained success with optimal volume status  Continue aggressive diuresis, which will likely also help to achieve rate control   Troponin negative x3   No RWMA   Systolic function estimated at 60% on TTE 8/26   TSH within normal limits  Electrolytes within acceptable limits  Volume overload:  Suspect this is 2/2 diastolic dysfunction in setting of new onset Afib with RVR  Aggressive diuresis, would continue lasix 40 mg IV TID today and keep murcia as patient still requiring 6L O2 via nc, with tentative plan to decrease dose and d/c murcia tomorrow    Would target at least 2-3L net negative over 24H        Lung:   Acute hypoxic respiratory failure:  Still requiring 6L nc to maintain saturations in low to mid 90s, fewer nocturnal desaturations last night as compared to night prior   Per history and physical exam this seems less likely infectious and more likely cardiac in origin   Continue aggressive diuresis, target at least 2L net negative over 24H  Initial concern for multifocal pneumonia based on imaging and presence of low grade fever of 38 2 with leukocytosis, however no infectious prodrome and procalcitonin negative x2  Remains afebrile and normal WBC  CTA negative for acute PE  Normal renal function makes possible vasculitis less likely   ESR mildly elevated  Suspicion for YUDI:  Patient with nocturnal desaturations and evidence of RV wall thickening on echocardiogram, recommend outpatient sleep study  Will order prn vapotherm to be used if requires more supplemental oxygen at night (or during the day) while diuresis ongoing      GI:   No active issues   Tolerating diet with bowel regimen in place      F/E/N:   Electrolytes within acceptable limits   Potassium replaced  Ongoing replacement as indicated      :  No active issues, will continue to monitor renal function closely in setting of diuresis      ID:   Possible community acquired pneumonia:  History not consistent with pneumonia, but patient did present with leukocytosis, tachypnea, and oxygen requirement and developed low grade fever on hospital day 1  Procalcitonin negative x2 making bacterial pneumonia unlikely  More likely is that acute hypoxic respiratory insufficiency due to volume overload in setting of diastolic dysfunction and new onset Afib with RVR  UTI, POA:  Urinalysis on admission with moderate bacteria and +nitrite  Unfortunately, specimen was not sent for culture  However, both ceftriaxone and levofloxacin should provide sufficient coverage for UTI  Blood cultures from 8/26 with no growth at 24H    End date in place for levofloxacin      Heme:   Eliquis for systemic anticoagulation in setting of new onset Afib  Hemoglobin stable, will continue to monitor      Endo:   No history of diabetes, glucose has been <180 since admission        Msk/Skin:   Continue meticulous skin care  Encourage mobility as able given tachycardia and oxygenation      Disposition:  Will sign off, please reach out with any additional questions/concerns  Counseling / Coordination of Care: Total time spent today 23 minutes  Greater than 50% of total time was spent with the patient and / or family counseling and / or coordination of care  A description of the counseling / coordination of care: Discussion with patient and hospitalist  ______________________________________________________________________    Chief Complaint: "I feel so much better"    Recent Events / Nursing Concern: Continues to diurese, remains on 6L nc and HR remains elevated  Vitals:   Vitals:    19 0400 19 0500 19 0600 19 0839   BP: 140/82 147/79 143/70    BP Location: Left arm Left arm Left arm    Pulse: (!) 120 (!) 115 (!) 127    Resp: 13 19 12    Temp: 97 7 °F (36 5 °C)      TempSrc: Temporal      SpO2: 91% 91% 92% 92%   Weight:   129 kg (285 lb)    Height:                 Temperature: Temp (24hrs), Av 7 °F (36 5 °C), Min:97 °F (36 1 °C), Max:98 3 °F (36 8 °C)  Current: Temperature: 97 7 °F (36 5 °C)    Hemodynamic Monitoring:  N/A       Respiratory:  SpO2 Device: O2 Device: Nasal cannula  O2 Flow Rate (L/min): 6 L/min    Physical Exam:  Physical Exam   Constitutional: She is oriented to person, place, and time  She appears well-developed and well-nourished  No distress  HENT:   Head: Normocephalic and atraumatic  Eyes: Pupils are equal, round, and reactive to light     Cardiovascular:   Irregularly irregular rhythm, rapid rate  Palpable peripheral pulses, +2 LE edema bilaterally   Pulmonary/Chest: Effort normal    +crackles at bilateral bases  Breathing comfortably on 6L nc, no increased work of breathing or use of accessory muscles   Abdominal: Soft  Genitourinary:   Genitourinary Comments: Clear yellow urine in murcia   Musculoskeletal: She exhibits edema  Neurological: She is alert and oriented to person, place, and time  Skin: Skin is warm and dry  Psychiatric: She has a normal mood and affect  Nursing note and vitals reviewed  Allergies:    Allergies   Allergen Reactions    Penicillins Rash       Medications:   Scheduled Meds:  Current Facility-Administered Medications:  acetaminophen 650 mg Oral Q6H PRN Franc Trejo MD   apixaban 5 mg Oral BID Percy Nicholas PA-C   atorvastatin 40 mg Oral Daily With Selwyn Rosenberg MD   diltiazem 120 mg Oral Q8H [facility] AYO Golden   diphenhydrAMINE 25 mg Oral HS PRN Tito Krishnamurthy MD   docusate sodium 100 mg Oral BID Génesis Chino MD   escitalopram 20 mg Oral Daily Franc Trejo MD   furosemide 40 mg Intravenous TID (diuretic) Jai Martin MD   levofloxacin 750 mg Oral Q24H Sayda Tyler MD   melatonin 3 mg Oral HS AYO Golden   metoprolol 5 mg Intravenous Q6H PRN Jai Martin MD   metoprolol succinate 100 mg Oral Q12H Jai Martin MD   ondansetron 4 mg Intravenous Q6H PRN Franc Trejo MD     Continuous Infusions:   PRN Meds:    acetaminophen 650 mg Q6H PRN   diphenhydrAMINE 25 mg HS PRN   metoprolol 5 mg Q6H PRN   ondansetron 4 mg Q6H PRN       Labs:   Results from last 7 days   Lab Units 08/29/19  0452 08/28/19  0445 08/27/19  0503 08/26/19  0918   WBC Thousand/uL 8 90 10 60 14 30* 18 40*   HEMOGLOBIN g/dL 12 1 11 6* 12 1 13 5   HEMATOCRIT % 36 1* 34 3* 36 2* 40 7*   PLATELETS Thousands/uL 294 259 237 263   NEUTROS PCT % 74 79* 82* 85*   MONOS PCT % 10 11 10 9     Results from last 7 days   Lab Units 08/29/19  0452 08/28/19  0445 08/27/19  0503 08/26/19  0918   SODIUM mmol/L 140 139 139 136   POTASSIUM mmol/L 3  4* 3 6 3 7 3 6   CHLORIDE mmol/L 105 104 108* 101   CO2 mmol/L 28 27 22 24   ANION GAP mmol/L 7 8 9 11   BUN mg/dL 24 25 21 20   CREATININE mg/dL 0 76 0 85 0 75 0 83   CALCIUM mg/dL 8 8 8 7 8 7 9 4   GLUCOSE RANDOM mg/dL 125* 132* 116* 173*   ALT U/L  --   --  22 18   AST U/L  --   --  18 13   ALK PHOS U/L  --   --  116 98   ALBUMIN g/dL  --   --  3 7 4 2   TOTAL BILIRUBIN mg/dL  --   --  1 10* 1 50*     Results from last 7 days   Lab Units 08/29/19  0452 08/27/19  0503   MAGNESIUM mg/dL 2 3 2 2      Results from last 7 days   Lab Units 08/26/19  0918   INR  1 26   PTT seconds 34      Results from last 7 days   Lab Units 08/26/19  1729 08/26/19  1239 08/26/19  0918   TROPONIN I ng/mL <0 03 <0 03 <0 03     Results from last 7 days   Lab Units 08/26/19  1239   LACTIC ACID mmol/L 1 3     ABG:    VBG:    Results from last 7 days   Lab Units 08/28/19  0445 08/27/19  0503   PROCALCITONIN ng/ml <0 05 <0 05       Diagnostic Imaging / Data: I have personally reviewed pertinent reports  and I have personally reviewed pertinent films in PACS  EKG: Afib with RVR  Code Status: Level 1 - Full Code    Portions of the record may have been created with voice recognition software  Occasional wrong word or "sound a like" substitutions may have occurred due to the inherent limitations of voice recognition software  Read the chart carefully and recognize, using context, where substitutions have occurred      SIGNATURE: Venkata Joshi MD  DATE: August 29, 2019  TIME: 8:55 AM

## 2019-08-29 NOTE — ASSESSMENT & PLAN NOTE
· BMI of 53 85  · Dietary, weight loss, and lifestyle modification counseling provided  ·  Supportive care  · Status post a nutrition eval

## 2019-08-29 NOTE — ASSESSMENT & PLAN NOTE
· Oxygen requirements have improved  · Secondary to suspected diastolic dysfunction CHF with an acute exacerbation as well as a community-acquired pneumonia  · Patient has diuresed approximately 8 lb - highest weight on this admission was 293 lb, current weight is 285 lb  · Cardiology are following  · Lasix is currently dosed at 40 mg IV t i d    · Await further Cardiology recommendation  · Supplement potassium for the hypokalemia

## 2019-08-29 NOTE — PHYSICAL THERAPY NOTE
Physical Therapy Treatment Session Note    Patient's Name: Talita Naik    Admitting Diagnosis  Cardiomegaly [I51 7]  Acute cystitis [N30 00]  Shortness of breath [R06 02]  Cholelithiasis [K80 20]  Hypertension [I10]  Hypoxia [R09 02]  Bilateral pneumonia [J18 9]  Pleural effusion, bilateral [J90]  Atrial fibrillation with RVR ([address] ) [I48 91]  Sepsis ([address] ) [A41 9]    Problem List  Patient Active Problem List   Diagnosis    Acute respiratory failure with hypoxia ([address] )    Sepsis due to pneumonia ([address] )    Atrial fibrillation with rapid ventricular response ([address] )    Essential hypertension    Morbid [facility] )    Major depressive disorder in full remission [facility])       Past Medical History  Past Medical History:   Diagnosis Date    Anxiety     Hyperlipidemia     Hypertension     Obesity        Past Surgical History  Past Surgical History:   Procedure Laterality Date    TONSILLECTOMY          08/29/19 1040   Pain Assessment   Pain Assessment No/denies pain   Pain Score No Pain   Restrictions/Precautions   Weight Bearing Precautions Per Order No   Other Precautions Fall Risk;Telemetry;O2;Multiple lines  (6 L O2)   General   Chart Reviewed Yes   Response to Previous Treatment Patient with no complaints from previous session   (pleasant & eager to participate)   Family/Caregiver Present No   Cognition   Overall Cognitive Status WFL   Arousal/Participation Alert; Cooperative   Attention Within functional limits   Orientation Level Oriented X4   Memory Within functional limits   Following Commands Follows one step commands without difficulty   Subjective   Subjective "I just can't get over how quickly I became weak"   Bed Mobility   Rolling R 5  Supervision   Additional items Assist x 1;HOB elevated; Bedrails   Rolling L 5  Supervision   Additional items Assist x 1;HOB elevated; Bedrails   Supine to Sit 4  Minimal assistance   Additional items Assist x 1;HOB elevated; Bedrails; Increased time required;Verbal cues;LE management   Sit to Supine   (DNT as pt  remained OOB in chair upon session conclusion )   Transfers   Sit to Stand 4  Minimal assistance   Additional items Assist x 1;HOB elevated; Bedrails; Increased time required;Verbal cues   Stand to Sit 5  Supervision   Additional items Assist x 1; Armrests; Verbal cues   Stand pivot 5  Supervision   Additional items Assist x 1; Increased time required;Verbal cues   Ambulation/Elevation   Gait pattern Improper Weight shift; Forward Flexion;Decreased foot clearance; Short stride   Gait Assistance 4  Minimal assist   Additional items Assist x 1;Verbal cues; Tactile cues   Assistive Device Rolling walker   Distance 15 feet in room   Stair Management Assistance Not tested   Balance   Static Sitting Normal   Dynamic Sitting Good   Static Standing Good   Dynamic Standing Good   Ambulatory Fair +   Endurance Deficit   Endurance Deficit Yes   Endurance Deficit Description /91 w/  upon AMB conclusion  O2 saturation 95%w/ O2 maintained throughout session   Activity Tolerance   Activity Tolerance Patient limited by fatigue;Treatment limited secondary to medical complications (Comment)   Nurse Made Aware yes, Tiffanie Bobby RN   Assessment   Prognosis Good   Problem List Decreased strength;Decreased endurance; Impaired balance;Decreased mobility; Decreased safety awareness; Obesity   Assessment Pt seen for PT treatment session this date with interventions consisting of gait training w/ emphasis on improving pt's ability to ambulate level surfaces x 15 feet with min A provided by therapist with RW and therapeutic activity consisting of training: bed mobility, supine<>sit transfers, sit<>stand transfers, static sitting tolerance at EOB for 5 minutes w/ B UE support, static standing tolerance for 3 minutes w/ B UE support, vc and tactile cues for static sitting posture faciliation, vc and tactile cues for static standing posture faciliation and stand pivot transfers towards L direction   Pt agreeable to PT treatment session upon arrival, pt found supine in bed w/ HOB elevated, in no apparent distress and A&O x 4  In comparison to previous session, pt with improvements in dynamic standing balance  Post session: all needs in reach and RN notified of session findings/recommendations Continue to recommend STR at time of d/c in order to maximize pt's functional independence and safety w/ mobility  Pt continues to be functioning below baseline level, and remains limited 2* factors listed above and including weakness, decreased endurance w/O2 usage, decreased activity tolerance, gait dysfunction, impaired balance, decline from PLOF  PT will continue to see pt while here in order to address the deficits listed above and provide interventions consistent w/ POC in effort to achieve LTGs  Barriers to Discharge Decreased caregiver support   Barriers to Discharge Comments pt  lives alone   Goals   Patient Goals get stronger   LTG Expiration Date 09/07/19   Long Term Goal #1 LTGs remain appropriate   Treatment Day 2   Plan   Treatment/Interventions Functional transfer training;LE strengthening/ROM; Therapeutic exercise; Endurance training;Bed mobility;Gait training;Equipment eval/education;Spoke to nursing;OT  (&neuro re-education w/balance training)   Progress Slow progress, decreased activity tolerance   PT Frequency Other (Comment)  (3-5x/wk)   Recommendation   Recommendation Short-term skilled PT   Equipment Recommended Walker   PT - OK to Discharge Yes  (if medically stable to STR)   Additional Comments Upon conclusion, pt  was resting OOB in chair w/SCD's active & all needs within reach       Sergee Masters, PT

## 2019-08-29 NOTE — ASSESSMENT & PLAN NOTE
· This is new onset atrial fibrillation  · Status post a Cardiology evaluation  · Status post a Cardizem drip  · Continue current dosing of Cardizem and metoprolol for rate control  · Continue apixaban for anticoagulation purposes

## 2019-08-29 NOTE — NURSING NOTE
Patient transferred to [facility]: Jaxson Click via :Aj MCCARTNEY DC instructions along with medical necessity and face sheet provided to transport team

## 2019-08-29 NOTE — NURSING NOTE
HR improved since Cardizem bolus  Awake and alert; denies pain  Lung sounds clear now, but dimminished, no crackles  Rest of assessment unchanged

## 2019-08-29 NOTE — NURSING NOTE
6L/min NC maintained; VS and sats stable  [place] patent  IVs intact and remain capped  Sleeping when undisturbed; arouses to name  Oriented when awake  Denies pain  Returns to sleep when left undisturbed

## 2019-08-29 NOTE — NURSING NOTE
Patient transferred to [facility] from ICU  Patient resting comfortably in bed with family at bedside  Patient denies any chest pain, s o b or  any needs at this time  Patient Alert and orientated x4, pulse ox 93% on 3L, A-fib on tele 8777, heart rate ranges anywhere between 90' to 130's  RN informed during report from ICU that  patient is to be transferred to [facility] for SUSIE and possible cardio version

## 2019-08-29 NOTE — LETTER
[address]  Dept: [phone]    August 31, 2019     Patient: Edwin Holloway   YOB: 1950   Date of Visit: 8/29/2019       To Whom it May Concern:    Edwin Holloway is under my professional care  She was seen in the hospital from 8/29/2019   to 08/31/19  She may return to work on 9/16/2019 without limitations  If you have any questions or concerns, please don't hesitate to call           Sincerely,          Natalie Galvez, DO
Clear

## 2019-08-29 NOTE — PLAN OF CARE
Problem: Potential for Falls  Goal: Patient will remain free of falls  Description  INTERVENTIONS:  - Assess patient frequently for physical needs  -  Identify cognitive and physical deficits and behaviors that affect risk of falls    -  [place] fall precautions as indicated by assessment   - Educate patient/family on patient safety including physical limitations  - Instruct patient to call for assistance with activity based on assessment  - Modify environment to reduce risk of injury  - Consider OT/PT consult to assist with strengthening/mobility  Outcome: Progressing     Problem: PAIN - ADULT  Goal: Verbalizes/displays adequate comfort level or baseline comfort level  Description  Interventions:  - Encourage patient to monitor pain and request assistance  - Assess pain using appropriate pain scale  - Administer analgesics based on type and severity of pain and evaluate response  - Implement non-pharmacological measures as appropriate and evaluate response  - Consider cultural and social influences on pain and pain management  - Notify physician/advanced practitioner if interventions unsuccessful or patient reports new pain  Outcome: Progressing     Problem: SAFETY ADULT  Goal: Maintain or return to baseline ADL function  Description  INTERVENTIONS:  -  Assess patient's ability to carry out ADLs; assess patient's baseline for ADL function and identify physical deficits which impact ability to perform ADLs (bathing, care of mouth/teeth, toileting, grooming, dressing, etc )  - Assess/evaluate cause of self-care deficits   - Assess range of motion  - Assess patient's mobility; develop plan if impaired  - Assess patient's need for assistive devices and provide as appropriate  - Encourage maximum independence but intervene and supervise when necessary  - Involve family in performance of ADLs  - Assess for home care needs following discharge   - Consider OT consult to assist with ADL evaluation and planning for discharge  - Provide patient education as appropriate  Outcome: Progressing  Goal: Maintain or return mobility status to optimal level  Description  INTERVENTIONS:  - Assess patient's baseline mobility status (ambulation, transfers, stairs, etc )    - Identify cognitive and physical deficits and behaviors that affect mobility  - Identify mobility aids required to assist with transfers and/or ambulation (gait belt, sit-to-stand, lift, walker, cane, etc )  - [place] fall precautions as indicated by assessment  - Record patient progress and toleration of activity level on Mobility SBAR; progress patient to next Phase/Stage  - Instruct patient to call for assistance with activity based on assessment  - Consider rehabilitation consult to assist with strengthening/weightbearing, etc   Outcome: Progressing     Problem: DISCHARGE PLANNING  Goal: Discharge to home or other facility with appropriate resources  Description  INTERVENTIONS:  - Identify barriers to discharge w/patient and caregiver  - Arrange for needed discharge resources and transportation as appropriate  - Identify discharge learning needs (meds, wound care, etc )  - Arrange for interpretive services to assist at discharge as needed  - Refer to Case Management Department for coordinating discharge planning if the patient needs post-hospital services based on physician/advanced practitioner order or complex needs related to functional status, cognitive ability, or social support system  Outcome: Progressing     Problem: Knowledge Deficit  Goal: Patient/family/caregiver demonstrates understanding of disease process, treatment plan, medications, and discharge instructions  Description  Complete learning assessment and assess knowledge base    Interventions:  - Provide teaching at level of understanding  - Provide teaching via preferred learning methods  Outcome: Progressing

## 2019-08-29 NOTE — SOCIAL WORK
Chart reviewed, pt to be transferred for higher level of care, I placed a call to [facility] at 13:40pm spoke with Rosa Marie and I was given the pending case # [id number] and then I was transferred to [facility] [place] then transferred to Dickson Torres I spoke with her @14:10pm and Dickson Torres stated that no Sheldon Emeterio is needed for transport from hospital to hospital and she will cx the case that has been started,I met with the pt and her daughter, they are both in agreement with the transfer for higher level of care

## 2019-08-29 NOTE — ASSESSMENT & PLAN NOTE
They had a difficult time at outside hospital controlling her new atrial fibrillation    They therefore transfer the patient for a SUSIE cardioversion  Patient is on Eliquis

## 2019-08-29 NOTE — ASSESSMENT & PLAN NOTE
· CTA chest with multifocal pneumonia - however patient's clinical history is not completely convincing for a pneumonia  · Urine for Streptococcus pneumoniae and Legionella antigen is negative  · Blood cultures negative thus far  · C reactive protein elevated, procalcitonin testing within normal limits  · Status post IR eval for thoracentesis, not indicated at this time  · Continue levofloxacin for 1 more day at which point the patient will have completed a 5 day course of antibiotics  · No further workup

## 2019-08-29 NOTE — CONSULTS
Consultation - Pulmonary Medicine   Michele Brady 78 y o  female MRN: [id number]  Unit/Bed#: ICU 03 Encounter: [id number]      Assessment/Plan:    1  Acute hypoxic respiratory failure- secondary to below   1  Improving  2  Titrated from to nasal cannula while at bedside and oxygen saturations remained above 95%  3  Keep oxygen saturations above 88%  4  Pulmonary toilet:  Increase activity as tolerated, out of bed as tolerated, cough and deep breathing exercise encouraged, incentive spirometry q 1 hour, PT/OT consulted  5  Went over proper technique for incentive spirometry while at bedside and patient demonstrated adequate use  2  Abnormal CTA chest secondary to volume overload vs suspected multifocal pneumonia   1  Suspicion of multifocal pneumonia earlier in admission- was started on Ceftriaxone and azithromycin for 2 days  Transitioned to Levaquin (day #2)   2  Procalcitonin negative and leukocytosis resolved  3  Cultures negative   4  Would recommend discontinuing antibiotics from a pulmonary prospective but can continue for suspicion of UTI on admission  5  Complete 5 day course  6  Continue diuresis per cardiology recommendations  7  CXR today shows stable if not mildly improved pulmonary vasular congestion and bilateral pleural effusions   8  Suggest CXR in 6-8 weeks and pulmonary follow up afterwards  3  Suspected obstructive sleep apnea  1  Reported nocturnal desaturations and subjective daytime drowsiness raises suspicion for YUDI  2  Would benefit from outpatient polysomnogram and pulmonary follow up  3  Supportive care included avoiding sleeping supine and weight loss discussed   4  Pulmonary hypertension  1  Secondary to either who group II/III with untreated YUDI and diastolic CHF   2  Treat underlying cause for now  3  Follow up with Cardiology and Pulmonary as outpatient  5  New onset Afib with RVR secondary to acute diastolic dysfunction  1   Cardiology following- Continue aggressive diuresis with Lasix 40 mg t i d , Cardizem 120 mg p o  Q 8 hours, metoprolol 50 mg p o  B i d   2  Monitor I/Os and daily weights  3  Monitor creatinine given aggressive diuresis  6  Morbid obesity with BMI > 50   1  Weight loss encouraged  2  Will follow with nutritionist as outpatient  3  Discussed heart healthy diet and modified exercise plan briefly while at bedside today    History of Present Illness   Physician Requesting Consult: Marisa Mays MD  Reason for Consult / Principal Problem:  Bilateral pneumonia  Hx and PE limited by: n/a   Chief Complaint:  Shortness of breath  HPI: Memo Singh is a 78 y o   female who presented to [facility] with complaints of shortness of breath  Patient has a past medical history positive for hypertension, hyperlipidemia, anxiety/depression, and morbid obesity  Presented to the hospital 08/26/2019 for progressively worsening shortness of breath over the last several days  Patient also complained of cough productive of clear sputum and worsening lower extremity swelling and abdominal bloating  Denies chest pain, palpitations, wheeze, sick contacts  Patient informs me that she had seen her primary care doctor a month prior who switched her blood pressure medications  She was told that a side effect to the medication was cough and swelling so she did not think anything of it  She thought she would be able to ride out the symptoms until her next PCP appointment however he got to the point where she was unable to walk more than 5 ft without getting dyspneic  At that point she decided to call her cousin and go to the emergency room for further medical evaluation  In the ER patient was found to have rapid AFib and was treated with IV Cardizem and IV Lopressor  She was then started on Cardizem drip after no resolution of RVR    CTA of the chest was obtained which was negative for PE however did show multifocal consolidations bilaterally and small bilateral pleural effusions  Patient was also found to be hypoxic and requiring 6 L nasal cannula  Pulmonary was consulted for this reason  Presently, patient subjectively feels much better since being admitted  Denies shortness of breath at rest, wheeze, cough, sputum production, hemoptysis  She notes significant improvement in belly bloating and lower extremity swelling  She admits that she has been using the incentive spirometer very frequently throughout the day  From a pulmonary perspective, patient does not follow with pulmonologist   She was never been diagnosed with any pulmonary disease including emphysema, COPD, asthma, pulmonary fibrosis  She is not taking inhalers or require oxygen at baseline  She has a very remote history of smoking for 1 year as a teenager  Patient is currently employed for the [employer] and denies exposures to asbestos, silica, coal, dust   Denies sick contacts  Denies bird exposure  Denies recent travel  Inpatient consult to Pulmonology  Consult performed by: Lizabeth Bassett PA-C  Consult ordered by: Byron Bustamante MD          Review of Systems   Constitutional: Negative  HENT: Negative  Eyes: Negative  Respiratory: Positive for cough and shortness of breath  Negative for wheezing  Cardiovascular: Positive for leg swelling  Negative for chest pain and palpitations  Endocrine: Negative  Genitourinary: Negative  Musculoskeletal: Negative  Skin: Negative  Allergic/Immunologic: Negative  Neurological: Negative  Hematological: Negative  Psychiatric/Behavioral: Negative          Historical Information   Past Medical History:   Diagnosis Date    Anxiety     Hyperlipidemia     Hypertension     Obesity      Past Surgical History:   Procedure Laterality Date    TONSILLECTOMY       Social History   Social History     Substance and Sexual Activity   Alcohol Use Yes    Comment: social     Social History Substance and Sexual Activity   Drug Use Never     Social History     Tobacco Use   Smoking Status Never Smoker   Smokeless Tobacco Never Used     Occupational History:  Noncontributory    Family History: History reviewed  No pertinent family history  Meds/Allergies   all current active meds have been reviewed, pertinent pulmonary meds have been reviewed and current meds:   Current Facility-Administered Medications   Medication Dose Route Frequency    acetaminophen (TYLENOL) tablet 650 mg  650 mg Oral Q6H PRN    apixaban (ELIQUIS) tablet 5 mg  5 mg Oral BID    atorvastatin (LIPITOR) tablet 40 mg  40 mg Oral Daily With Dinner    diltiazem (CARDIZEM) tablet 120 mg  120 mg Oral Q8H [facility]    diphenhydrAMINE (BENADRYL) tablet 25 mg  25 mg Oral HS PRN    docusate sodium (COLACE) capsule 100 mg  100 mg Oral BID    escitalopram (LEXAPRO) tablet 20 mg  20 mg Oral Daily    furosemide (LASIX) injection 40 mg  40 mg Intravenous TID (diuretic)    [START ON 8/30/2019] levofloxacin (LEVAQUIN) tablet 750 mg  750 mg Oral Q24H    melatonin tablet 3 mg  3 mg Oral HS    metoprolol (LOPRESSOR) injection 5 mg  5 mg Intravenous Q6H PRN    metoprolol succinate (TOPROL-XL) 24 hr tablet 100 mg  100 mg Oral Q12H    ondansetron (ZOFRAN) injection 4 mg  4 mg Intravenous Q6H PRN    potassium chloride (K-DUR,KLOR-CON) CR tablet 40 mEq  40 mEq Oral BID       Allergies   Allergen Reactions    Penicillins Rash       Objective   Vitals: Blood pressure 125/74, pulse (!) 120, temperature 97 8 °F (36 6 °C), temperature source Temporal, resp  rate 17, height 5' 1" (1 549 m), weight 129 kg (285 lb), SpO2 93 %  3L NC,Body mass index is 53 85 kg/m²      Intake/Output Summary (Last 24 hours) at 8/29/2019 1132  Last data filed at 8/29/2019 0800  Gross per 24 hour   Intake 340 ml   Output 4000 ml   Net -3660 ml     Invasive Devices     Peripheral Intravenous Line            Peripheral IV 08/26/19 Left Forearm 3 days    Peripheral IV 08/26/19 Right Wrist 2 days                Physical Exam   Constitutional: She is oriented to person, place, and time  She appears well-developed  No distress  Pleasant obese elderly woman seen sitting in chair   HENT:   Head: Normocephalic and atraumatic  Right Ear: External ear normal    Left Ear: External ear normal    Mouth/Throat: Oropharynx is clear and moist  No oropharyngeal exudate  Eyes: Pupils are equal, round, and reactive to light  EOM are normal  Right eye exhibits no discharge  Left eye exhibits no discharge  Neck: Normal range of motion  Neck supple  No tracheal deviation present  Cardiovascular: Intact distal pulses  An irregularly irregular rhythm present  Tachycardia present  No murmur heard  Pulmonary/Chest: Effort normal  She has decreased breath sounds in the right lower field and the left lower field  She has no wheezes  She has rales (faint bibasilar crackles)  Abdominal: Soft  Bowel sounds are normal  She exhibits no distension  There is no tenderness  Musculoskeletal: She exhibits edema (+1/2 pitting pretibial and pedal edema bilaterally)  She exhibits no tenderness or deformity  Neurological: She is alert and oriented to person, place, and time  No cranial nerve deficit  Skin: Skin is dry  She is not diaphoretic  There is erythema  No pallor  Psychiatric: She has a normal mood and affect  Her behavior is normal  Judgment and thought content normal        Lab Results:   I have personally reviewed pertinent lab results  , ABG: No results found for: PHART, OCI8WFA, PO2ART, QUE0FPM, C5RNBBRR, BEART, SOURCE, BNP: No results found for: BNP, CBC:   Lab Results   Component Value Date    WBC 8 90 08/29/2019    HGB 12 1 08/29/2019    HCT 36 1 (L) 08/29/2019    MCV 90 08/29/2019     08/29/2019    MCH 30 2 08/29/2019    MCHC 33 6 08/29/2019    RDW 13 4 08/29/2019    MPV 7 9 (L) 08/29/2019   , CMP:   Lab Results   Component Value Date    SODIUM 140 08/29/2019    K 3 4 (L) 08/29/2019  08/29/2019    CO2 28 08/29/2019    BUN 24 08/29/2019    CREATININE 0 76 08/29/2019    CALCIUM 8 8 08/29/2019    EGFR 80 08/29/2019   , PT/INR: No results found for: PT, INR, Troponin: No results found for: TROPONINI    Imaging Studies: I have personally reviewed pertinent reports  and I have personally reviewed pertinent films in PACS     CTA chest PE study 08/26/2019  No pulmonary embolism noted  Heart is mildly enlarged  Bilateral multifocal consolidations without tracheal or endobronchial lesions  Small bilateral pleural effusions right greater than left  Chest x-ray today on initial review appears stable however patient appears more supine in position compared to previous exam     EKG, Pathology, and Other Studies: I have personally reviewed pertinent reports  Echocardiogram 85/93/2404  Systolic function is normal with EF of 60%  No regional wall motion abnormalities  Right ventricle wall is mildly increased  Mild MR  T RV max 3 12 suggestive of pulmonary hypertension  Pulmonary Results (PFTs, PSG): None to review     VTE Prophylaxis: Reason for no pharmacologic prophylaxis On Eliquis for new onset Afib    Code Status: Level 1 - Full Code    Counseling/Coordination of Care: Total floor / unit time spent today 35 minutes  Greater than 50% of total time was spent with the patient and / or family counseling and / or coordination of care  A description of the counseling / coordination of care: treatment plan and education    Portions of the record may have been created with voice recognition software  Occasional wrong word or "sound a like" substitutions may have occurred due to the inherent limitations of voice recognition software  Read the chart carefully and recognize, using context, where substitutions have occurred

## 2019-08-29 NOTE — ASSESSMENT & PLAN NOTE
· This is new onset atrial fibrillation  · Status post a Cardiology evaluation  · Status post a Cardizem drip  · Continue current dosing of Cardizem and metoprolol for rate control  · Continue apixaban for anticoagulation purposes    Update at 2:55 p m  Patient continues to have issues with rapid ventricular response  Case reviewed with Dr Kaia Sidhu - he would like the patient to be transferred to the [facility] for a SUSIE and a cardioversion    Accepting cardiologist is Dr Chandni Carmona, patient is to be admitted to the [facility] Internal Medicine hospitalist service - verbal sign-out provided to Dr Reza Douglas who accepted the patient in transfer

## 2019-08-29 NOTE — PLAN OF CARE
Problem: Potential for Falls  Goal: Patient will remain free of falls  Description  INTERVENTIONS:  - Assess patient frequently for physical needs  -  Identify cognitive and physical deficits and behaviors that affect risk of falls    -  [place] fall precautions as indicated by assessment   - Educate patient/family on patient safety including physical limitations  - Instruct patient to call for assistance with activity based on assessment  - Modify environment to reduce risk of injury  - Consider OT/PT consult to assist with strengthening/mobility  Outcome: Progressing     Problem: PAIN - ADULT  Goal: Verbalizes/displays adequate comfort level or baseline comfort level  Description  Interventions:  - Encourage patient to monitor pain and request assistance  - Assess pain using appropriate pain scale  - Administer analgesics based on type and severity of pain and evaluate response  - Implement non-pharmacological measures as appropriate and evaluate response  - Consider cultural and social influences on pain and pain management  - Notify physician/advanced practitioner if interventions unsuccessful or patient reports new pain  Outcome: Progressing     Problem: SAFETY ADULT  Goal: Maintain or return to baseline ADL function  Description  INTERVENTIONS:  -  Assess patient's ability to carry out ADLs; assess patient's baseline for ADL function and identify physical deficits which impact ability to perform ADLs (bathing, care of mouth/teeth, toileting, grooming, dressing, etc )  - Assess/evaluate cause of self-care deficits   - Assess range of motion  - Assess patient's mobility; develop plan if impaired  - Assess patient's need for assistive devices and provide as appropriate  - Encourage maximum independence but intervene and supervise when necessary  - Involve family in performance of ADLs  - Assess for home care needs following discharge   - Consider OT consult to assist with ADL evaluation and planning for discharge  - Provide patient education as appropriate  Outcome: Progressing  Goal: Maintain or return mobility status to optimal level  Description  INTERVENTIONS:  - Assess patient's baseline mobility status (ambulation, transfers, stairs, etc )    - Identify cognitive and physical deficits and behaviors that affect mobility  - Identify mobility aids required to assist with transfers and/or ambulation (gait belt, sit-to-stand, lift, walker, cane, etc )  - [place] fall precautions as indicated by assessment  - Record patient progress and toleration of activity level on Mobility SBAR; progress patient to next Phase/Stage  - Instruct patient to call for assistance with activity based on assessment  - Consider rehabilitation consult to assist with strengthening/weightbearing, etc   Outcome: Progressing     Problem: DISCHARGE PLANNING  Goal: Discharge to home or other facility with appropriate resources  Description  INTERVENTIONS:  - Identify barriers to discharge w/patient and caregiver  - Arrange for needed discharge resources and transportation as appropriate  - Identify discharge learning needs (meds, wound care, etc )  - Arrange for interpretive services to assist at discharge as needed  - Refer to Case Management Department for coordinating discharge planning if the patient needs post-hospital services based on physician/advanced practitioner order or complex needs related to functional status, cognitive ability, or social support system  Outcome: Progressing     Problem: Knowledge Deficit  Goal: Patient/family/caregiver demonstrates understanding of disease process, treatment plan, medications, and discharge instructions  Description  Complete learning assessment and assess knowledge base    Interventions:  - Provide teaching at level of understanding  - Provide teaching via preferred learning methods  Outcome: Progressing

## 2019-08-29 NOTE — DISCHARGE SUMMARY
Discharge- Memo Singh 1950, 78 y o  female MRN: [id number]    Unit/Bed#: -01 Encounter: [id number]    Primary Care Provider: Sg Avila MD   Date and time admitted to hospital: 8/26/2019  9:27 AM        * Sepsis due to pneumonia [facility])  Assessment & Plan  · CTA chest with multifocal pneumonia - however patient's clinical history is not completely convincing for a pneumonia  · Urine for Streptococcus pneumoniae and Legionella antigen is negative  · Blood cultures negative thus far  · C reactive protein elevated, procalcitonin testing within normal limits  · Status post IR eval for thoracentesis, not indicated at this time  · Continue levofloxacin for 1 more day at which point the patient will have completed a 5 day course of antibiotics  · No further workup    Atrial fibrillation with rapid ventricular response ([address] )  Assessment & Plan  · This is new onset atrial fibrillation  · Status post a Cardiology evaluation  · Status post a Cardizem drip  · Continue current dosing of Cardizem and metoprolol for rate control  · Continue apixaban for anticoagulation purposes    Update at 2:55 p m  Patient continues to have issues with rapid ventricular response  Case reviewed with Dr Huey Franco - he would like the patient to be transferred to the [facility] for a SUSIE and a cardioversion    Accepting cardiologist is Dr Ciara Bullock, patient is to be admitted to the [facility] Internal Medicine hospitalist service - verbal sign-out provided to Dr Jessica Whiting who accepted the patient in transfer    Acute respiratory failure with hypoxia [facility])  Assessment & Plan  · Oxygen requirements have improved  · Secondary to suspected diastolic dysfunction CHF with an acute exacerbation as well as a community-acquired pneumonia  · Patient has diuresed approximately 8 lb - highest weight on this admission was 293 lb, current weight is 285 lb  · Cardiology are following  · Lasix is currently dosed at 40 mg IV t i d  · Await further Cardiology recommendation  · Supplement potassium for the hypokalemia    Morbid obesity ([address] )  Assessment & Plan  · BMI of 53 85  · Dietary, weight loss, and lifestyle modification counseling provided  ·  Supportive care  · Status post a nutrition eval          Discharging Physician / Practitioner: Sayda Tyler MD  PCP: Joaquin Ann MD  Admission Date:   Admission Orders (From admission, onward)     Ordered        08/26/19 1228  Inpatient Admission (expected length of stay for this patient Order details is greater than two midnights)  Once                   Discharge Date: 08/29/19        Consultations During Hospital Stay:  · Cardiology  · Pulmonary  · Interventional Radiology  · Critical care    Procedures Performed:   · None    Significant Findings / Test Results:   · Chest x-ray-Mild improvement in the overall appearance of the chest, suggesting resolving vascular congestion and improving multifocal pneumonia  · CTA chest PE protocol-Slightly limited study due to respiratory motion artifact   No definite evidence of pulmonary embolism  Bilateral multifocal consolidation compatible with multilobar pneumonia   Small bilateral pleural effusions, presumably parapneumonic in nature  Mild cardiomegaly  Cholelithiasis  · Initial chest x-ray - Bibasilar opacities could be pneumonia or bibasilar edema   Suggestion of trace amount of pleural fluid as well  Cardiomegaly noted  Incidental Findings:   · None     Test Results Pending at Discharge (will require follow up): · None     Outpatient Tests Requested:  · None    Complications:  None    Reason for Admission:  Shortness of breath/sepsis/AFib with RVR    Hospital Course:     Gurjit Webster is a 78 y o  female patient who originally presented to the hospital on 8/26/2019 due to shortness of breath    Please refer to the initial history and physical examination completed by Dr Solo Sandra for the initial presenting features and complaints  In brief the patient is a pleasant 21-year-old female who was admitted after she came in complaining of shortness of breath  She initially met sepsis criteria  Sepsis was deemed to be secondary to a pneumonia  She was admitted to the ICU  She was started on broad-spectrum IV antibiotics in the form of Rocephin and Zithromax  Patient was also found to be in a state of AFib with RVR at time of admission  She was started on a Cardizem drip  A cardiology consultation was obtained  Cardiology continued to follow throughout her stay  Unfortunately they had a hard time controlling her rate  Despite being maxed out on a Cardizem drip, having metoprolol initiated at high doses as well as digoxin the patient remained in a state of AFib with RVR  Ultimately the cardiologist felt the patient should be transferred to a higher level of care for a SUSIE and cardioversion  Patient was [facility] under the care of the hospitalist service there  She is scheduled for a SUSIE and cardioversion in the morning  Additionally, her initial chest x-ray was concerning for pneumonia however she did not have the clinical signs and symptoms to back up that radiographic diagnosis  She was noted to have pleural effusions  An Interventional Radiology consultation was obtained for the further evaluation of a potential thoracentesis however it was deemed that the thoracentesis was not indicated  Critical Care Medicine followed along with the patient  Pulmonary also evaluated the patient  All parties involved were in agreement that a 5 day course of antibiotics should suffice  She was also diuresed with IV Lasix  She was hypokalemic and potassium was supplemented  She is being transferred down to [facility] for higher level of care because of the need for a SUSIE and cardioversion    Further management as per the doctors there  Please see above list of diagnoses and related plan for additional information  Condition at Discharge: fair     Discharge Day Visit / Exam:     Please refer to my daily progress note from earlier today for today's physical exam    Discussion with Family:  No family members were present at the time of my discharge evaluation    Discharge instructions/Information to patient and family:   See after visit summary for information provided to patient and family  Provisions for Follow-Up Care:  See after visit summary for information related to follow-up care and any pertinent home health orders  Disposition:     [address] [facility]  [address] Transfer to [address] [place] [address] to [facility] SNF:   · Not Applicable to this Patient - Not Applicable to this Patient    Planned Readmission:  To [facility]     Discharge Statement:  I spent 55 minutes discharging the patient  This time was spent on the day of discharge  I had direct contact with the patient on the day of discharge  Greater than 50% of the total time was spent examining patient, answering all patient questions, arranging and discussing plan of care with patient as well as directly providing post-discharge instructions  Additional time then spent on discharge activities  Discharge Medications:  See after visit summary for reconciled discharge medications provided to patient and family        ** Please Note: This note has been constructed using a voice recognition system **

## 2019-08-29 NOTE — H&P
H&P- Edwin Holloway 1950, 78 y o  female MRN: [id number]    Unit/Bed#: E4 -01 Encounter: [id number]    Primary Care Provider: Heron Carranza MD   Date and time admitted to hospital: 8/29/2019  4:38 PM        Atrial fibrillation with rapid ventricular response [facility])  Assessment & Plan  They had a difficult time at outside hospital controlling her new atrial fibrillation  They therefore transfer the patient for a SUSIE cardioversion  Patient is on Eliquis    Acute respiratory failure with hypoxia [facility])  Assessment & Plan  Due to pneumonia  Completing treatment with levaquin          Chief Complaint:   Shortness of breath      History of Present Illness:    Edwin Holloway is a 78 y o  female who presents with shortness of breath  She was a transfer from [facility]   She had been there for sepsis, pneumonia and new AFib with rapid ventricular response  She was treated with several days of antibiotics for pneumonia  She is improving as far as that is concerned  But she still has rapid atrial fibrillation which has been difficult to control  She was seen by Cardiology there  They suggested transfer to [facility] for a SUSIE cardioversion  She will get that tomorrow  She is not coughing up any mucus  No fever or chills         Review of Systems:    Review of Systems   Constitutional: Positive for fever  HENT: Negative  Eyes: Negative  Respiratory: Positive for cough and shortness of breath  Cardiovascular: Positive for palpitations  Gastrointestinal: Negative  Endocrine: Negative  Genitourinary: Negative  Musculoskeletal: Negative  All other systems reviewed and are negative          Past Medical and Surgical History:     Past Medical History:   Diagnosis Date    Anxiety     Hyperlipidemia     Hypertension     Obesity        Past Surgical History:   Procedure Laterality Date    TONSILLECTOMY           Home Medications:    Prior to Admission medications Medication Sig Start Date End Date Taking? Authorizing Provider   amLODIPine-benazepril (LOTREL) 10-40 MG per capsule Daily    Historical Provider, MD   ASPIRIN 81 PO Daily 4/5/19   Historical Provider, MD   atorvastatin (LIPITOR) 40 mg tablet atorvastatin 40 mg tablet   take one tab by mouth once daily    Historical Provider, MD   escitalopram (LEXAPRO) 20 mg tablet Daily    Historical Provider, MD     I have reviewed home medications with patient personally  Allergies: Allergies   Allergen Reactions    Other Itching     lobster    Penicillins Rash         Social History:    Substance Use History:   Social History     Substance and Sexual Activity   Alcohol Use Yes    Comment: social     Social History     Tobacco Use   Smoking Status Never Smoker   Smokeless Tobacco Never Used     Social History     Substance and Sexual Activity   Drug Use Never         Family History:    non-contributory      Physical Exam:     Vitals:   Blood Pressure: 132/94 (08/29/19 1703)  Pulse: (!) 112 (08/29/19 1703)  Temperature: 97 6 °F (36 4 °C) (08/29/19 1703)  Temp Source: Tympanic (08/29/19 1703)  Respirations: 18 (08/29/19 1703)  Height: 5' 2" (157 5 cm) (08/29/19 1703)  Weight - Scale: 126 kg (278 lb 10 6 oz) (08/29/19 1703)  SpO2: 92 % (08/29/19 1703)    Physical Exam   HENT:   Head: Normocephalic and atraumatic  Eyes: Pupils are equal, round, and reactive to light  EOM are normal    Cardiovascular: Normal rate  Exam reveals no gallop and no friction rub  No murmur heard  Tachy, irregular   Pulmonary/Chest: Effort normal and breath sounds normal  She has no wheezes  She has no rales  Abdominal: Soft  Bowel sounds are normal  There is no tenderness  obese   Musculoskeletal: She exhibits no edema  Nursing note and vitals reviewed       Additional Data:     Lab Results: I have personally reviewed pertinent reports        Results from last 7 days   Lab Units 08/29/19  0452   WBC Thousand/uL 8 90   HEMOGLOBIN g/dL 12 1   HEMATOCRIT % 36 1*   PLATELETS Thousands/uL 294   NEUTROS PCT % 74   LYMPHS PCT % 13*   MONOS PCT % 10   EOS PCT % 2     Results from last 7 days   Lab Units 08/29/19  0452  08/27/19  0503   POTASSIUM mmol/L 3 4*   < > 3 7   CHLORIDE mmol/L 105   < > 108*   CO2 mmol/L 28   < > 22   BUN mg/dL 24   < > 21   CREATININE mg/dL 0 76   < > 0 75   CALCIUM mg/dL 8 8   < > 8 7   ALK PHOS U/L  --   --  116   ALT U/L  --   --  22   AST U/L  --   --  18    < > = values in this interval not displayed  Results from last 7 days   Lab Units 08/26/19  0918   INR  1 26                 Imaging: I have personally reviewed pertinent reports  No orders to display           VTE Prophylaxis: Apixaban (Eliquis)        Anticipated Length of Stay:  Patient will be admitted on an Observation basis with an anticipated length of stay of  Less than 2 midnights  Justification for Hospital Stay: patient will get SUSIE cardioversion and likely be discharged home tomorrow  Total Time for Visit, including Counseling / Coordination of Care: 45 minutes  Greater than 50% of this total time spent on direct patient counseling and coordination of care  ** Please Note: This note has been constructed using a voice recognition system   **

## 2019-08-29 NOTE — EMTALA/ACUTE CARE TRANSFER
[facility] SURGICAL UNIT  [address] [address]  [phone]  Dept: [phone]      ACUTE CARE TRANSFER CONSENT    NAME José ARNOLD 1950                              MRN [id number]    I have been informed of my rights regarding examination, treatment, and transfer   by Dr Janeen Austin:   SUSIE and cardioversion    Risks:   road traffic accident      Transfer Request:  I acknowledge that my medical condition has been evaluated and explained to me by the treating physician or other qualified medical person and/or my attending physician who has recommended and offered to me further medical examination and treatment  I understand the Hospital's obligation with respect to the treatment and stabilization of my medical condition  I nevertheless request to be transferred  I release the Hospital, the doctor, and any other persons caring for me from all responsibility or liability for any injury or ill effects that may result from my transfer and agree to accept all responsibility for the consequences of my choice to transfer, rather than receive stabilizing treatment at the Hospital  I understand that because the transfer is my request, my insurance may not provide reimbursement for the services  The Hospital will assist and direct me and my family in how to make arrangements for transfer, but the hospital is not liable for any fees charged by the transport service  In spite of this understanding, I refuse to consent to further medical examination and treatment which has been offered to me, and request transfer to    I authorize the performance of emergency medical procedures and treatments upon me in both transit and upon arrival at the receiving facility  Additionally, I authorize the release of any and all medical records to the receiving facility and request they be transported with me, if possible      I authorize the performance of emergency medical procedures and treatments upon me in both transit and upon arrival at the receiving facility  Additionally, I authorize the release of any and all medical records to the receiving facility and request they be transported with me, if possible  I understand that the safest mode of transportation during a medical emergency is an ambulance and that the Hospital advocates the use of this mode of transport  Risks of traveling to the receiving facility by car, including absence of medical control, life sustaining equipment, such as oxygen, and medical personnel has been explained to me and I fully understand them  (LUDWIN CORRECT BOX BELOW)  [  ]  I consent to the stated transfer and to be transported by ambulance/helicopter  [  ]  I consent to the stated transfer, but refuse transportation by ambulance and accept full responsibility for my transportation by car  I understand the risks of non-ambulance transfers and I exonerate the Hospital and its staff from any deterioration in my condition that results from this refusal     X___________________________________________    DATE  19  TIME________  Signature of patient or legally responsible individual signing on patient behalf           RELATIONSHIP TO PATIENT_________________________          Provider Certification    NAME Shalonda Kc                                        [facility] 1950                              MRN [id number]    A medical screening exam was performed on the above named patient    Based on the examination:    Condition Necessitating Transfer persistent atrial fibrillation with rapid ventricular response    Patient Condition:   stable    Reason for Transfer:   need for SUSIE and cardioversion    Transfer Requirements: Facility     · Space available and qualified personnel available for treatment as acknowledged by   patient [employer]  · Agreed to accept transfer and to provide appropriate medical treatment as acknowledged by Dr Guerda Bergeron          · Appropriate medical records of the examination and treatment of the patient are provided at the time of transfer   [address] [facility], [address] _______  · Transfer will be performed by qualified personnel from    and appropriate transfer equipment as required, including the use of necessary and appropriate life support measures  Provider Certification: I have examined the patient and explained the following risks and benefits of being transferred/refusing transfer to the patient/family:         Based on these reasonable risks and benefits to the patient and/or the unborn child(sandhya), and based upon the information available at the time of the patients examination, I certify that the medical benefits reasonably to be expected from the provision of appropriate medical treatments at another medical facility outweigh the increasing risks, if any, to the individuals medical condition, and in the case of labor to the unborn child, from effecting the transfer      X____________________________________________ DATE 08/29/19        TIME_______      ORIGINAL - SEND TO MEDICAL RECORDS   COPY - SEND WITH PATIENT DURING TRANSFER

## 2019-08-29 NOTE — PLAN OF CARE
Problem: DISCHARGE PLANNING - CARE MANAGEMENT  Goal: Discharge to post-acute care or home with appropriate resources  Description  INTERVENTIONS:  - Conduct assessment to determine patient/family and health care team treatment goals, and need for post-acute services based on payer coverage, community resources, and patient preferences, and barriers to discharge  - Address psychosocial, clinical, and financial barriers to discharge as identified in assessment in conjunction with the patient/family and health care team  - Arrange appropriate level of post-acute services according to patient's   needs and preference and payer coverage in collaboration with the physician and health care team  - Communicate with and update the patient/family, physician, and health care team regarding progress on the discharge plan  - Arrange appropriate transportation to post-acute venues    Pt needs to be transferred for higher level of care     Outcome: Completed

## 2019-08-29 NOTE — PLAN OF CARE
Problem: Prexisting or High Potential for Compromised Skin Integrity  Goal: Skin integrity is maintained or improved  Description  INTERVENTIONS:  - Identify patients at risk for skin breakdown  - Assess and monitor skin integrity  - Assess and monitor nutrition and hydration status  - Monitor labs   - Assess for incontinence   - Turn and reposition patient  - Assist with mobility/ambulation  - Relieve pressure over bony prominences  - Avoid friction and shearing  - Provide appropriate hygiene as needed including keeping skin clean and dry  - Evaluate need for skin moisturizer/barrier cream  - Collaborate with interdisciplinary team   - Patient/family teaching  - Consider wound care consult   Outcome: Completed     Problem: Potential for Falls  Goal: Patient will remain free of falls  Description  INTERVENTIONS:  - Assess patient frequently for physical needs  -  Identify cognitive and physical deficits and behaviors that affect risk of falls    -  [place] fall precautions as indicated by assessment   - Educate patient/family on patient safety including physical limitations  - Instruct patient to call for assistance with activity based on assessment  - Modify environment to reduce risk of injury  - Consider OT/PT consult to assist with strengthening/mobility  Outcome: Completed     Problem: PAIN - ADULT  Goal: Verbalizes/displays adequate comfort level or baseline comfort level  Description  Interventions:  - Encourage patient to monitor pain and request assistance  - Assess pain using appropriate pain scale  - Administer analgesics based on type and severity of pain and evaluate response  - Implement non-pharmacological measures as appropriate and evaluate response  - Consider cultural and social influences on pain and pain management  - Notify physician/advanced practitioner if interventions unsuccessful or patient reports new pain  Outcome: Completed     Problem: INFECTION - ADULT  Goal: Absence or prevention of progression during hospitalization  Description  INTERVENTIONS:  - Assess and monitor for signs and symptoms of infection  - Monitor lab/diagnostic results  - Monitor all insertion sites, i e  indwelling lines, tubes, and drains  - Monitor endotracheal if appropriate and nasal secretions for changes in amount and color  - [place] appropriate cooling/warming therapies per order  - Administer medications as ordered  - Instruct and encourage patient and family to use good hand hygiene technique  - Identify and instruct in appropriate isolation precautions for identified infection/condition  Outcome: Completed  Goal: Absence of fever/infection during neutropenic period  Description  INTERVENTIONS:  - Monitor WBC    Outcome: Completed     Problem: SAFETY ADULT  Goal: Maintain or return to baseline ADL function  Description  INTERVENTIONS:  -  Assess patient's ability to carry out ADLs; assess patient's baseline for ADL function and identify physical deficits which impact ability to perform ADLs (bathing, care of mouth/teeth, toileting, grooming, dressing, etc )  - Assess/evaluate cause of self-care deficits   - Assess range of motion  - Assess patient's mobility; develop plan if impaired  - Assess patient's need for assistive devices and provide as appropriate  - Encourage maximum independence but intervene and supervise when necessary  - Involve family in performance of ADLs  - Assess for home care needs following discharge   - Consider OT consult to assist with ADL evaluation and planning for discharge  - Provide patient education as appropriate  Outcome: Completed  Goal: Maintain or return mobility status to optimal level  Description  INTERVENTIONS:  - Assess patient's baseline mobility status (ambulation, transfers, stairs, etc )    - Identify cognitive and physical deficits and behaviors that affect mobility  - Identify mobility aids required to assist with transfers and/or ambulation (gait belt, sit-to-stand, lift, walker, cane, etc )  - [place] fall precautions as indicated by assessment  - Record patient progress and toleration of activity level on Mobility SBAR; progress patient to next Phase/Stage  - Instruct patient to call for assistance with activity based on assessment  - Consider rehabilitation consult to assist with strengthening/weightbearing, etc   Outcome: Completed     Problem: DISCHARGE PLANNING  Goal: Discharge to home or other facility with appropriate resources  Description  INTERVENTIONS:  - Identify barriers to discharge w/patient and caregiver  - Arrange for needed discharge resources and transportation as appropriate  - Identify discharge learning needs (meds, wound care, etc )  - Arrange for interpretive services to assist at discharge as needed  - Refer to Case Management Department for coordinating discharge planning if the patient needs post-hospital services based on physician/advanced practitioner order or complex needs related to functional status, cognitive ability, or social support system  Outcome: Completed     Problem: Knowledge Deficit  Goal: Patient/family/caregiver demonstrates understanding of disease process, treatment plan, medications, and discharge instructions  Description  Complete learning assessment and assess knowledge base    Interventions:  - Provide teaching at level of understanding  - Provide teaching via preferred learning methods  Outcome: Completed     Problem: NEUROSENSORY - ADULT  Goal: Achieves stable or improved neurological status  Description  INTERVENTIONS  - Monitor and report changes in neurological status  - Monitor vital signs such as temperature, blood pressure, glucose, and any other labs ordered   - Initiate measures to prevent increased intracranial pressure  - Monitor for seizure activity and implement precautions if appropriate      Outcome: Completed  Goal: Remains free of injury related to seizures activity  Description  INTERVENTIONS  - Maintain airway, patient safety  and administer oxygen as ordered  - Monitor patient for seizure activity, document and report duration and description of seizure to physician/advanced practitioner  - If seizure occurs,  ensure patient safety during seizure  - Reorient patient post seizure  - Seizure pads on all 4 side rails  - Instruct patient/family to notify RN of any seizure activity including if an aura is experienced  - Instruct patient/family to call for assistance with activity based on nursing assessment  - Administer anti-seizure medications if ordered    Outcome: Completed  Goal: Achieves maximal functionality and self care  Description  INTERVENTIONS  - Monitor swallowing and airway patency with patient fatigue and changes in neurological status  - Encourage and assist patient to increase activity and self care     - Encourage visually impaired, hearing impaired and aphasic patients to use assistive/communication devices  Outcome: Completed     Problem: CARDIOVASCULAR - ADULT  Goal: Maintains optimal cardiac output and hemodynamic stability  Description  INTERVENTIONS:  - Monitor I/O, vital signs and rhythm  - Monitor for S/S and trends of decreased cardiac output  - Administer and titrate ordered vasoactive medications to optimize hemodynamic stability  - Assess quality of pulses, skin color and temperature  - Assess for signs of decreased coronary artery perfusion  - Instruct patient to report change in severity of symptoms  Outcome: Completed  Goal: Absence of cardiac dysrhythmias or at baseline rhythm  Description  INTERVENTIONS:  - Continuous cardiac monitoring, vital signs, obtain 12 lead EKG if ordered  - Administer antiarrhythmic and heart rate control medications as ordered  - Monitor electrolytes and administer replacement therapy as ordered  Outcome: Completed     Problem: RESPIRATORY - ADULT  Goal: Achieves optimal ventilation and oxygenation  Description  INTERVENTIONS:  - Assess for changes in respiratory status  - Assess for changes in mentation and behavior  - Position to facilitate oxygenation and minimize respiratory effort  - Oxygen administered by appropriate delivery if ordered  - Initiate smoking cessation education as indicated  - Encourage broncho-pulmonary hygiene including cough, deep breathe, Incentive Spirometry  - Assess the need for suctioning and aspirate as needed  - Assess and instruct to report SOB or any respiratory difficulty  - Respiratory Therapy support as indicated  Outcome: Completed     Problem: GASTROINTESTINAL - ADULT  Goal: Minimal or absence of nausea and/or vomiting  Description  INTERVENTIONS:  - Administer IV fluids if ordered to ensure adequate hydration  - Maintain NPO status until nausea and vomiting are resolved  - Nasogastric tube if ordered  - Administer ordered antiemetic medications as needed  - Provide nonpharmacologic comfort measures as appropriate  - Advance diet as tolerated, if ordered  - Consider nutrition services referral to assist patient with adequate nutrition and appropriate food choices  Outcome: Completed  Goal: Maintains or returns to baseline bowel function  Description  INTERVENTIONS:  - Assess bowel function  - Encourage oral fluids to ensure adequate hydration  - Administer IV fluids if ordered to ensure adequate hydration  - Administer ordered medications as needed  - Encourage mobilization and activity  - Consider nutritional services referral to assist patient with adequate nutrition and appropriate food choices  Outcome: Completed  Goal: Maintains adequate nutritional intake  Description  INTERVENTIONS:  - Monitor percentage of each meal consumed  - Identify factors contributing to decreased intake, treat as appropriate  - Assist with meals as needed  - Monitor I&O, weight, and lab values if indicated  - Obtain nutrition services referral as needed  Outcome: Completed  Goal: Establish and maintain optimal ostomy function  Description  INTERVENTIONS:  - Assess bowel function  - Encourage oral fluids to ensure adequate hydration  - Administer IV fluids if ordered to ensure adequate hydration   - Administer ordered medications as needed  - Encourage mobilization and activity  - Nutrition services referral to assist patient with appropriate food choices  - Assess stoma site  - Consider wound care consult   Outcome: Completed     Problem: GENITOURINARY - ADULT  Goal: Maintains or returns to baseline urinary function  Description  INTERVENTIONS:  - Assess urinary function  - Encourage oral fluids to ensure adequate hydration if ordered  - Administer IV fluids as ordered to ensure adequate hydration  - Administer ordered medications as needed  - Offer frequent toileting  - Follow urinary retention protocol if ordered  Outcome: Completed  Goal: Absence of urinary retention  Description  INTERVENTIONS:  - Assess patients ability to void and empty bladder  - Monitor I/O  - Bladder scan as needed  - Discuss with physician/AP medications to alleviate retention as needed  - Discuss catheterization for long term situations as appropriate  Outcome: Completed  Goal: Urinary catheter remains patent  Description  INTERVENTIONS:  - Assess patency of urinary catheter  - If patient has a chronic murcia, consider changing catheter if non-functioning  - Follow guidelines for intermittent irrigation of non-functioning urinary catheter  Outcome: Completed     Problem: Nutrition/Hydration-ADULT  Goal: Nutrient/Hydration intake appropriate for improving, restoring or maintaining nutritional needs  Description  Monitor and assess patient's nutrition/hydration status for malnutrition  Collaborate with interdisciplinary team and initiate plan and interventions as ordered  Monitor patient's weight and dietary intake as ordered or per policy  Utilize nutrition screening tool and intervene as necessary   Determine patient's food preferences and provide high-protein, high-caloric foods as appropriate       INTERVENTIONS:  - Monitor oral intake, urinary output, labs, and treatment plans  - Assess nutrition and hydration status and recommend course of action  - Evaluate amount of meals eaten  - Assist patient with eating if necessary   - Allow adequate time for meals  - Recommend/ encourage appropriate diets, oral nutritional supplements, and vitamin/mineral supplements  - Order, calculate, and assess calorie counts as needed  - Recommend, monitor, and adjust tube feedings and TPN/PPN based on assessed needs  - Assess need for intravenous fluids  - Provide specific nutrition/hydration education as appropriate  - Include patient/family/caregiver in decisions related to nutrition  Outcome: Completed

## 2019-08-30 ENCOUNTER — APPOINTMENT (INPATIENT)
Dept: NON INVASIVE DIAGNOSTICS | Facility: HOSPITAL | Age: 69
DRG: 308 | End: 2019-08-30
Payer: COMMERCIAL

## 2019-08-30 PROBLEM — J18.9 PNEUMONIA: Status: ACTIVE | Noted: 2019-08-30

## 2019-08-30 LAB
ANION GAP SERPL CALCULATED.3IONS-SCNC: 9 MMOL/L (ref 4–13)
BASOPHILS # BLD AUTO: 0.06 THOUSANDS/ΜL (ref 0–0.1)
BASOPHILS NFR BLD AUTO: 1 % (ref 0–1)
BUN SERPL-MCNC: 25 MG/DL (ref 5–25)
CALCIUM SERPL-MCNC: 8.9 MG/DL (ref 8.3–10.1)
CHLORIDE SERPL-SCNC: 104 MMOL/L (ref 100–108)
CO2 SERPL-SCNC: 30 MMOL/L (ref 21–32)
CREAT SERPL-MCNC: 1.05 MG/DL (ref 0.6–1.3)
DIGOXIN SERPL-MCNC: 0.4 NG/ML (ref 0.8–2)
EOSINOPHIL # BLD AUTO: 0.36 THOUSAND/ΜL (ref 0–0.61)
EOSINOPHIL NFR BLD AUTO: 4 % (ref 0–6)
ERYTHROCYTE [DISTWIDTH] IN BLOOD BY AUTOMATED COUNT: 13 % (ref 11.6–15.1)
GFR SERPL CREATININE-BSD FRML MDRD: 54 ML/MIN/1.73SQ M
GLUCOSE P FAST SERPL-MCNC: 119 MG/DL (ref 65–99)
GLUCOSE SERPL-MCNC: 119 MG/DL (ref 65–140)
HCT VFR BLD AUTO: 43 % (ref 34.8–46.1)
HGB BLD-MCNC: 13.6 G/DL (ref 11.5–15.4)
IMM GRANULOCYTES # BLD AUTO: 0.03 THOUSAND/UL (ref 0–0.2)
IMM GRANULOCYTES NFR BLD AUTO: 0 % (ref 0–2)
LYMPHOCYTES # BLD AUTO: 1.88 THOUSANDS/ΜL (ref 0.6–4.47)
LYMPHOCYTES NFR BLD AUTO: 20 % (ref 14–44)
MCH RBC QN AUTO: 29.5 PG (ref 26.8–34.3)
MCHC RBC AUTO-ENTMCNC: 31.6 G/DL (ref 31.4–37.4)
MCV RBC AUTO: 93 FL (ref 82–98)
MONOCYTES # BLD AUTO: 0.78 THOUSAND/ΜL (ref 0.17–1.22)
MONOCYTES NFR BLD AUTO: 8 % (ref 4–12)
NEUTROPHILS # BLD AUTO: 6.52 THOUSANDS/ΜL (ref 1.85–7.62)
NEUTS SEG NFR BLD AUTO: 67 % (ref 43–75)
NRBC BLD AUTO-RTO: 0 /100 WBCS
PLATELET # BLD AUTO: 394 THOUSANDS/UL (ref 149–390)
PMV BLD AUTO: 9.2 FL (ref 8.9–12.7)
POTASSIUM SERPL-SCNC: 3.5 MMOL/L (ref 3.5–5.3)
RBC # BLD AUTO: 4.61 MILLION/UL (ref 3.81–5.12)
SODIUM SERPL-SCNC: 143 MMOL/L (ref 136–145)
WBC # BLD AUTO: 9.63 THOUSAND/UL (ref 4.31–10.16)

## 2019-08-30 PROCEDURE — 80162 ASSAY OF DIGOXIN TOTAL: CPT | Performed by: INTERNAL MEDICINE

## 2019-08-30 PROCEDURE — 93321 DOPPLER ECHO F-UP/LMTD STD: CPT | Performed by: INTERNAL MEDICINE

## 2019-08-30 PROCEDURE — 80048 BASIC METABOLIC PNL TOTAL CA: CPT | Performed by: HOSPITALIST

## 2019-08-30 PROCEDURE — 93312 ECHO TRANSESOPHAGEAL: CPT

## 2019-08-30 PROCEDURE — G8988 SELF CARE GOAL STATUS: HCPCS

## 2019-08-30 PROCEDURE — 85025 COMPLETE CBC W/AUTO DIFF WBC: CPT | Performed by: HOSPITALIST

## 2019-08-30 PROCEDURE — 99253 IP/OBS CNSLTJ NEW/EST LOW 45: CPT | Performed by: INTERNAL MEDICINE

## 2019-08-30 PROCEDURE — 92960 CARDIOVERSION ELECTRIC EXT: CPT

## 2019-08-30 PROCEDURE — 93325 DOPPLER ECHO COLOR FLOW MAPG: CPT | Performed by: INTERNAL MEDICINE

## 2019-08-30 PROCEDURE — 93005 ELECTROCARDIOGRAM TRACING: CPT

## 2019-08-30 PROCEDURE — 93312 ECHO TRANSESOPHAGEAL: CPT | Performed by: INTERNAL MEDICINE

## 2019-08-30 PROCEDURE — 99232 SBSQ HOSP IP/OBS MODERATE 35: CPT | Performed by: HOSPITALIST

## 2019-08-30 PROCEDURE — 5A2204Z RESTORATION OF CARDIAC RHYTHM, SINGLE: ICD-10-PCS | Performed by: INTERNAL MEDICINE

## 2019-08-30 PROCEDURE — 92960 CARDIOVERSION ELECTRIC EXT: CPT | Performed by: INTERNAL MEDICINE

## 2019-08-30 PROCEDURE — G8987 SELF CARE CURRENT STATUS: HCPCS

## 2019-08-30 PROCEDURE — B246ZZ4 ULTRASONOGRAPHY OF RIGHT AND LEFT HEART, TRANSESOPHAGEAL: ICD-10-PCS | Performed by: INTERNAL MEDICINE

## 2019-08-30 PROCEDURE — 97166 OT EVAL MOD COMPLEX 45 MIN: CPT

## 2019-08-30 RX ORDER — SODIUM CHLORIDE 9 MG/ML
INJECTION, SOLUTION INTRAVENOUS CONTINUOUS PRN
Status: DISCONTINUED | OUTPATIENT
Start: 2019-08-30 | End: 2019-08-30 | Stop reason: SURG

## 2019-08-30 RX ORDER — FUROSEMIDE 10 MG/ML
40 INJECTION INTRAMUSCULAR; INTRAVENOUS
Status: DISCONTINUED | OUTPATIENT
Start: 2019-08-31 | End: 2019-08-31 | Stop reason: HOSPADM

## 2019-08-30 RX ORDER — PROPOFOL 10 MG/ML
INJECTION, EMULSION INTRAVENOUS AS NEEDED
Status: DISCONTINUED | OUTPATIENT
Start: 2019-08-30 | End: 2019-08-30 | Stop reason: SURG

## 2019-08-30 RX ADMIN — ESCITALOPRAM OXALATE 20 MG: 20 TABLET ORAL at 08:26

## 2019-08-30 RX ADMIN — DIGOXIN 125 MCG: 125 TABLET ORAL at 08:26

## 2019-08-30 RX ADMIN — APIXABAN 5 MG: 5 TABLET, FILM COATED ORAL at 17:34

## 2019-08-30 RX ADMIN — PROPOFOL 50 MG: 10 INJECTION, EMULSION INTRAVENOUS at 12:19

## 2019-08-30 RX ADMIN — PROPOFOL 50 MG: 10 INJECTION, EMULSION INTRAVENOUS at 12:28

## 2019-08-30 RX ADMIN — METOPROLOL SUCCINATE 100 MG: 50 TABLET, EXTENDED RELEASE ORAL at 00:00

## 2019-08-30 RX ADMIN — APIXABAN 5 MG: 5 TABLET, FILM COATED ORAL at 08:26

## 2019-08-30 RX ADMIN — LEVOFLOXACIN 750 MG: 750 TABLET, FILM COATED ORAL at 08:26

## 2019-08-30 RX ADMIN — DILTIAZEM HYDROCHLORIDE 120 MG: 60 TABLET, FILM COATED ORAL at 06:27

## 2019-08-30 RX ADMIN — ATORVASTATIN CALCIUM 40 MG: 40 TABLET, FILM COATED ORAL at 17:34

## 2019-08-30 RX ADMIN — PROPOFOL 50 MG: 10 INJECTION, EMULSION INTRAVENOUS at 12:24

## 2019-08-30 RX ADMIN — METOPROLOL SUCCINATE 100 MG: 50 TABLET, EXTENDED RELEASE ORAL at 10:42

## 2019-08-30 RX ADMIN — MELATONIN 3 MG: 3 TAB ORAL at 22:57

## 2019-08-30 RX ADMIN — PROPOFOL 50 MG: 10 INJECTION, EMULSION INTRAVENOUS at 12:17

## 2019-08-30 RX ADMIN — SODIUM CHLORIDE: 0.9 INJECTION, SOLUTION INTRAVENOUS at 11:47

## 2019-08-30 NOTE — NURSING NOTE
Patient back from SUSIE/Cardioversion, now is Sinus Dhruv (48 bpm) to a Sinus Arrhythmia  Patient resting in bed on 2L NC  No complaints of pain, sob, cp  Patient states she is hungry, ordering lunch

## 2019-08-30 NOTE — PROGRESS NOTES
Progress Note - Juan F Cisneros 1950, 78 y o  female MRN: [id number]    Unit/Bed#: E4 -01 Encounter: [id number]    Primary Care Provider: Janee Calabrese MD   Date and time admitted to hospital: 2019  4:38 PM        * Atrial fibrillation with rapid ventricular response ([address] )  Assessment & Plan  On Eliquis  Has been difficult to control  For DCCV today  Hopefully home tomorrow    Acute respiratory failure with hypoxia ([address] )  Assessment & Plan  Due to pneumonia  Completing treatment with levaquin    Pneumonia  Assessment & Plan  Completing course of levaquin          Subjective:   Feels well  No chest pain  No sob      Objective:     Vitals:   Temp (24hrs), Av 8 °F (36 6 °C), Min:97 6 °F (36 4 °C), Max:97 9 °F (36 6 °C)    Temp:  [97 6 °F (36 4 °C)-97 9 °F (36 6 °C)] 97 9 °F (36 6 °C)  HR:  [] 112  Resp:  [18] 18  BP: (125-146)/(82-94) 141/94  SpO2:  [92 %-100 %] 94 %  Body mass index is 50 4 kg/m²  Input and Output Summary (last 24 hours): Intake/Output Summary (Last 24 hours) at 2019 1123  Last data filed at 2019 0900  Gross per 24 hour   Intake --   Output 1250 ml   Net -1250 ml       Physical Exam:     Physical Exam   HENT:   Head: Normocephalic and atraumatic  Eyes: Pupils are equal, round, and reactive to light  EOM are normal    Cardiovascular: Exam reveals no gallop and no friction rub  No murmur heard  Tachy, irregular   Pulmonary/Chest: Effort normal and breath sounds normal  She has no wheezes  She has no rales  Abdominal: Soft  Bowel sounds are normal  There is no tenderness  Musculoskeletal: She exhibits no edema  Nursing note and vitals reviewed              Additional Data:     Labs:    Results from last 7 days   Lab Units 19  0527   WBC Thousand/uL 9 63   HEMOGLOBIN g/dL 13 6   HEMATOCRIT % 43 0   PLATELETS Thousands/uL 394*   NEUTROS PCT % 67   LYMPHS PCT % 20   MONOS PCT % 8   EOS PCT % 4     Results from last 7 days   Lab Units 08/30/19  0527  08/27/19  0503   POTASSIUM mmol/L 3 5   < > 3 7   CHLORIDE mmol/L 104   < > 108*   CO2 mmol/L 30   < > 22   BUN mg/dL 25   < > 21   CREATININE mg/dL 1 05   < > 0 75   CALCIUM mg/dL 8 9   < > 8 7   ALK PHOS U/L  --   --  116   ALT U/L  --   --  22   AST U/L  --   --  18    < > = values in this interval not displayed  Results from last 7 days   Lab Units 08/26/19  0918   INR  1 26                   * I Have Reviewed All Lab Data     Recent Cultures (last 7 days):     Results from last 7 days   Lab Units 08/26/19  1526 08/26/19  1239 08/26/19  1235 08/26/19  1112   BLOOD CULTURE   --  No Growth at 72 hrs  No Growth at 72 hrs    --    LEGIONELLA URINARY ANTIGEN  Negative  --   --  Negative         Last 24 Hours Medication List:     Current Facility-Administered Medications:  acetaminophen 650 mg Oral Q6H PRN Alvin Prechtel, DO   apixaban 5 mg Oral BID Alvin Prechtel, DO   atorvastatin 40 mg Oral Daily With Alfalight, DO   digoxin 125 mcg Oral Daily Alvin Prechtel, DO   diltiazem 120 mg Oral Q8H [facility] Alvin Prechtel, DO   diphenhydrAMINE 25 mg Oral HS PRN Alvin Prechtel, DO   docusate sodium 100 mg Oral BID Alvin Prechtel, DO   escitalopram 20 mg Oral Daily Alvin Prechtel, DO   furosemide 40 mg Intravenous TID (diuretic) Alvin Prechtel, DO   levofloxacin 750 mg Oral Q24H Alvin Prechtel, DO   melatonin 3 mg Oral HS Alvin Prechtel, DO   metoprolol 5 mg Intravenous Q6H PRN Alvin Prechtel, DO   metoprolol succinate 100 mg Oral Q12H Alvin Prechtel, DO   ondansetron 4 mg Intravenous Q6H PRN Alvin Prechtel, DO         VTE Pharmacologic Prophylaxis:   Pharmacologic: Apixaban (Eliquis)      Current Length of Stay: 1 day(s)    Current Patient Status: Inpatient       Discharge Plan: home tomorrow    Code Status: Level 1 - Full Code           Today, Patient Was Seen By: Conan Boxer, DO    ** Please Note: Dictation voice to text software may have been used in the creation of this document   **

## 2019-08-30 NOTE — PROCEDURES
Procedure note:  SUSIE Preliminary Report    Impression:     LV:  Normal size and systolic function  Left ventricular ejection fraction ~ 60%  LA:  Mildly dilated  GRAY:  Normal size and function  No spontaneous echocontrast ("smoke"), or thrombus  RA:  Normal size  Interatrial septum:  Probable very small tunneled PFO with right to left shunting noted by color flow doppler study  RV:  Normal size and systolic function  MV:  Normal structure  No mitral stenosis  Trace regurgitation  AV:  Normal structure  No aortic stenosis  Trace regurgitation  TV:  Normal structure  No tricuspid stenosis  Mild regurgitation  PV:  Grossly normal but poorly visualized  No gross pulmonic stenosis or significant regurgitation  AO:  Normal appearing root, descending thoracic aorta, and aortic arch with minimal atherocalcific change in the upper descending thoracic aorta    Informed consent obtained from patient prior to procedure after all indications, risks, and benefits of SUSIE thoroughly discussed  Propofol was utilized for sedation and administered intravenously by Anesthesia  Blood pressure, EKG, pulse oximetry, respirations, and level of consciousness were monitored throughout the procedure  Pt tolerated procedure well with nurse anesthetist performing sedation and monitoring  SUSIE probe passed without difficulty with no blood seen on probe upon extubation

## 2019-08-30 NOTE — PROCEDURES
Kelsie Conroy is a 78 y o  y o  female who is admitted to the hospital for new Afib w/ RVR, difficult to control with medications  Informed consent was obtained after indications, risks, and benefits were thoroughly reviewed  The patient was identified in the cath lab  A time out procedure was performed  Cardioversion leads were placed in an carlo-apical fashion  Continuous pulse oximetry and ECG were performed with blood pressure measurements at regular intervals  The patient was sedated by the anesthesia service  After adequate sedation, a SUSIE was performed  Please see separate report for full details  Briefly, the LV function was normal, and there was no left atrial or left atrial appendage thrombus identified  After the SUSIE, adequate sedation was once again confirmed  The patient was cardioverted to sinus bradycardia and intermittent junctional escape rhythm with a 250J biphasic shock  There were no immediate complications  Results were discussed with the family

## 2019-08-30 NOTE — CASE MANAGEMENT
Patient was out of her room getting testing completed  CM was unable to provide the patient with an observation notice  CM will make another attempt at a later time today to provide the patient with the notice

## 2019-08-30 NOTE — PLAN OF CARE
Problem: OCCUPATIONAL THERAPY ADULT  Goal: Performs self-care activities at highest level of function for planned discharge setting  See evaluation for individualized goals  Description  Treatment Interventions: ADL retraining, Functional transfer training, UE strengthening/ROM, Endurance training, Patient/family training, Equipment evaluation/education, Compensatory technique education          See flowsheet documentation for full assessment, interventions and recommendations  Note:   Limitation: Decreased ADL status, Decreased UE strength, Decreased endurance  Prognosis: Good  Assessment: Pt is a 70y/o female x-ferred from [facility] 2* SOB and A-fib  Pt was at [address] 2* PNA, sepsis  Pt required a s/p cardioversion(8/30)  PTA pt states independence with all aspects of his ADLs, transfers, ambulation--w/o device; +falls=1, neg home alone, +  During initial eval, pt demonstrated slight deficits with her functional balance, functional mobility, ADL status, activity tolerance(currently fair=15-20mins), and R UE strength  Pt would benefit from continued OT tx for the above deficits  3-5xwk/1-2wks     OT Discharge Recommendation: Home with family support(home vs  oupt P T  )

## 2019-08-30 NOTE — SOCIAL WORK
Pt is observation status at this time  RN reports pt was started on Eliquis, but no script to check at this time  CM put coupons for 30 days free and $10 00 co-pay in binder for pt  RN is aware of this

## 2019-08-30 NOTE — CONSULTS
Consulted for heart failure diet teaching  Provided verbal and written diet education on heart failure nutrition therapy  Reviewed foods high in sodium to avoid, demostrated how to read a food label, discussed balanced meals, encouraged cooking and meal preparation at home versus frozen meals, provided tips for flavoring versus salt  Encouraged to monitor weight at home  Provided RD outpatient contact information to f/u  Goal diet cardiac, 2gm Na which can promote weight loss

## 2019-08-30 NOTE — UTILIZATION REVIEW
Notification of Discharge  This is a Notification of Discharge from our facility [address]  Please be advised that this patient has been discharge from our facility  Below you will find the admission and discharge date and time including the patients disposition  PRESENTATION DATE: 8/26/2019  9:27 AM  OBS ADMISSION DATE:   IP ADMISSION DATE: 8/26/19 1228   DISCHARGE DATE: 8/29/2019  3:56 PM  DISPOSITION: [address]   Admission Orders listed below:  Admission Orders (From admission, onward)     Ordered        08/26/19 1228  Inpatient Admission (expected length of stay for this patient Order details is greater than two midnights)  Once                   Please contact the UR Department if additional information is required to close this patient's authorization/case  [address]  Utilization Review Department  Phone: [phone]; Fax [phone]  [email or web address]  ATTENTION: Please call with any questions or concerns to [phone]  and carefully listen to the prompts so that you are directed to the right person  Send all requests for admission clinical reviews, approved or denied determinations and any other requests to fax [phone]   All voicemails are confidential

## 2019-08-30 NOTE — UTILIZATION REVIEW
Initial Clinical Review    PLEASE NOTE: Pt was upgraded to IP 8/30 @ 1051 from OBS 8/29 @ 1725  ( OBS was incorrect status as pt was already IP @ [facility])     Admission: Date/Time/Statement:    Start   Ordered   08/30/19 1052  Inpatient Admission Once     Transfer Service: Hospitalist       Question Answer Comment   Admitting Physician ROGE KOO    Level of Care Med Surg    Estimated length of stay More than 2 Midnights    Certification I certify that inpatient services are medically necessary for this patient for a duration of greater than two midnights  See H&P and MD Progress Notes for additional information about the patient's course of treatment  08/30/19 1051         Orders Placed This Encounter   Procedures    Place in Observation     Standing Status:   Standing     Number of Occurrences:   1     Order Specific Question:   Admitting Physician     Answer:   Cassidy Parry [[id number]]     Order Specific Question:   Level of Care     Answer:   Med Surg [[id number]]     Order Specific Question:   Bed request comments     Answer:   cardiac tele     ED Arrival Information     Patient not seen in ED - [place] [facility] [place]IE [place]                     No chief complaint on file  Assessment/Plan:  79 yo female transferred [place] [address] [place] [facility] with rapid a fib that has been difficult to control  Despite being maxed out on a Cardizem drip, having metoprolol initiated at high doses as well as digoxin the patient remained in a state of AFib with RVR  Cardio recommended transfer for SUSIE Cardioversion  On day of transfer HR remained 90 to 130's( Initially presented to ED with SOB & found to be in A FIB with RVR with rates to the 180's   Admitted there with  sepsis secondary to pneumonia, acute respiratory failure with hypoxia, new onset Afib with RVR)     Per Cardio: 8/30: Atrial fibrillation with accelerated ventricular rate: Currently on high-dose AV blocking medications with continue tachycardia -SUSIE cardioversion advised and discussed with the patient who was agreeable ~~> will proceed today  Will reassess sinus rate after cardioversion as patient may be able to have some reduction in her current regimen of AV blocking meds  Continue Eliquis anticoagulation    SUSIE Cardioversion 8/30: The patient was cardioverted to sinus bradycardia and intermittent junctional escape rhythm with a 250J biphasic shock          ADMISSION Vitals   Temperature Pulse Respirations Blood Pressure SpO2   08/28/19 1900 08/28/19 1900 08/28/19 1900 08/28/19 1900 08/28/19 1900   97 6 °F (36 4 °C) 100 18 116/74 95 %      Temp Source Heart Rate Source Patient Position - Orthostatic VS BP Location FiO2 (%)   08/28/19 1900 08/28/19 1900 08/28/19 1900 08/28/19 1900 --   Tympanic Monitor Lying Left arm       Pain Score       08/29/19 1703       No Pain        Wt Readings from Last 1 Encounters:   08/30/19 125 kg (275 lb 9 2 oz)     Additional Vital Signs:   08/30/19 0725  97 9 °F (36 6 °C) 112 18 141/94 94 % Nasal cannula Lying   08/29/19 2300  97 6 °F (36 4 °C) 67 18 134/93 100 % Nasal cannula Sitting   08/29/19 1703  97 6 °F (36 4 °C) 112  18 132/94 92 % Nasal cannula Sitting       Pertinent Labs/Diagnostic Test Results:   Results from last 7 days   Lab Units 08/30/19  0527 08/29/19  0452 08/28/19  0445 08/27/19  0503 08/26/19  0918   WBC Thousand/uL 9 63 8 90 10 60 14 30* 18 40*   HEMOGLOBIN g/dL 13 6 12 1 11 6* 12 1 13 5   HEMATOCRIT % 43 0 36 1* 34 3* 36 2* 40 7*   PLATELETS Thousands/uL 394* 294 259 237 263   NEUTROS ABS Thousands/µL 6 52 6 60* 8 30* 11 70* 15 70*     Results from last 7 days   Lab Units 08/30/19  0527 08/29/19  0452 08/28/19  0445 08/27/19  0503 08/26/19  0918   SODIUM mmol/L 143 140 139 139 136   POTASSIUM mmol/L 3 5 3 4* 3 6 3 7 3 6   CHLORIDE mmol/L 104 105 104 108* 101   CO2 mmol/L 30 28 27 22 24   ANION GAP mmol/L 9 7 8 9 11   BUN mg/dL 25 24 25 21 20 CREATININE mg/dL 1 05 0 76 0 85 0 75 0 83   EGFR ml/min/1 73sq m 54 80 70 81 72   CALCIUM mg/dL 8 9 8 8 8 7 8 7 9 4   MAGNESIUM mg/dL  --  2 3  --  2 2  --      Results from last 7 days   Lab Units 08/27/19  0503 08/26/19  0918   AST U/L 18 13   ALT U/L 22 18   ALK PHOS U/L 116 98   TOTAL PROTEIN g/dL 6 3* 7 2   ALBUMIN g/dL 3 7 4 2   TOTAL BILIRUBIN mg/dL 1 10* 1 50*     Results from last 7 days   Lab Units 08/30/19  0527 08/29/19  0452 08/28/19  0445 08/27/19  0503 08/26/19  0918   GLUCOSE RANDOM mg/dL 119 125* 132* 116* 173*     Results from last 7 days   Lab Units 08/26/19  1729 08/26/19  1239 08/26/19  0918   TROPONIN I ng/mL <0 03 <0 03 <0 03     Results from last 7 days   Lab Units 08/26/19  0918   D DIMER QUANT ng/ml (FEU) 402*     Results from last 7 days   Lab Units 08/26/19  0918   PROTIME seconds 14 6*   INR  1 26   PTT seconds 34     Results from last 7 days   Lab Units 08/27/19  0503   TSH 3RD GENERATON uIU/mL 1 170     Results from last 7 days   Lab Units 08/28/19  0445 08/27/19  0503   PROCALCITONIN ng/ml <0 05 <0 05     Results from last 7 days   Lab Units 08/26/19  1239   LACTIC ACID mmol/L 1 3     Results from last 7 days   Lab Units 08/26/19  0918   BNP pg/mL 499*     Results from last 7 days   Lab Units 08/28/19  0445   CRP mg/L 64 9*   SED RATE mm/hour 52*     Results from last 7 days   Lab Units 08/26/19  1112   CLARITY UA  Cloudy*   COLOR UA  Yellow   SPEC GRAV UA  1 020   PH UA  5 5   GLUCOSE UA mg/dl Negative   KETONES UA mg/dl Negative   BLOOD UA  Trace-Intact*   PROTEIN UA mg/dl Trace*   NITRITE UA  Positive*   BILIRUBIN UA  Negative   UROBILINOGEN UA E U /dl 0 2   LEUKOCYTES UA  Trace*   WBC UA /hpf 4-10*   RBC UA /hpf 2-4*   BACTERIA UA /hpf Moderate*   EPITHELIAL CELLS WET PREP /hpf Moderate*     Results from last 7 days   Lab Units 08/26/19  1526 08/26/19  1112   STREP PNEUMONIAE ANTIGEN, URINE  Negative  --    LEGIONELLA URINARY ANTIGEN  Negative Negative     Results from last 7 days   Lab Units 08/26/19  1239 08/26/19  1235   BLOOD CULTURE  No Growth at 72 hrs  No Growth at 72 hrs  ED Treatment:   Medication Administration - No Administrations Displayed (No Start Event Found)     None        Past Medical History:   Diagnosis Date    Anxiety     Hyperlipidemia     Hypertension     Obesity      Present on Admission:   Acute respiratory failure with hypoxia (HCC)   Atrial fibrillation with rapid ventricular response (HCC)      Admitting Diagnosis: A-fib (HCC) [I48 91]  Age/Sex: 78 y o  female  Admission Orders:    Current Facility-Administered Medications:  acetaminophen 650 mg Oral Q6H PRN    apixaban 5 mg Oral BID    atorvastatin 40 mg Oral Daily With Dinner    digoxin 125 mcg Oral Daily    diltiazem 120 mg Oral Q8H [facility]    diphenhydrAMINE 25 mg Oral HS PRN    docusate sodium 100 mg Oral BID    escitalopram 20 mg Oral Daily    furosemide 40 mg Intravenous TID (diuretic)    levofloxacin 750 mg Oral Q24H    melatonin 3 mg Oral HS    metoprolol 5 mg Intravenous Q6H PRN    metoprolol succinate 100 mg Oral Q12H    ondansetron 4 mg Intravenous Q6H PRN        TELE,   NPO,   Vapotherm O2,   SCD's  IP CONSULT TO CARDIOLOGY    Network Utilization Review Department  Phone: [phone]; Fax [phone]  [email or web address]  ATTENTION: Please call with any questions or concerns to [phone]  and carefully listen to the prompts so that you are directed to the right person  Send all requests for admission clinical reviews, approved or denied determinations and any other requests to fax [phone]   All voicemails are confidential

## 2019-08-30 NOTE — ANESTHESIA PREPROCEDURE EVALUATION
Review of Systems/Medical History      No history of anesthetic complications     Cardiovascular  Hyperlipidemia, Hypertension , Dysrhythmias (rapid ventricular response) , atrial fibrillation,    Pulmonary  Pneumonia ([facility], transferred for cardioversion),        GI/Hepatic  Negative GI/hepatic ROS               Endo/Other    Obesity  morbid obesity   GYN       Hematology   Musculoskeletal       Neurology    TIA (6 months ago, no residual symptoms),    Psychology   Anxiety,              Physical Exam    Airway    Mallampati score: II  TM Distance: >3 FB  Neck ROM: full     Dental   Comment: edentulous,     Cardiovascular  Rhythm: irregular, Rate: abnormal,     Pulmonary  Decreased breath sounds,     Other Findings        Anesthesia Plan  ASA Score- 3     Anesthesia Type- IV sedation with anesthesia with ASA Monitors  Additional Monitors:   Airway Plan:         Plan Factors-    Induction- intravenous  Postoperative Plan-     Informed Consent- Anesthetic plan and risks discussed with patient

## 2019-08-30 NOTE — OCCUPATIONAL THERAPY NOTE
OccupationalTherapy Evaluation(time=2521-1570)     Patient Name: Shalonda Kc  Today's Date: 8/30/2019  Problem List  Patient Active Problem List   Diagnosis    [facility] respiratory failure with hypoxia [facility]    Sepsis due to pneumonia ([facility]    Atrial fibrillation with rapid ventricular response ([facility]    Essential hypertension    Morbid obesity ([facility]    Major depressive disorder in full remission ([facility]    Pneumonia     Past Medical History  Past Medical History:   Diagnosis Date    Anxiety     Hyperlipidemia     Hypertension     Obesity      Past Surgical History  Past Surgical History:   Procedure Laterality Date    TONSILLECTOMY               08/30/19 1445   Note Type   Note type Eval only   Restrictions/Precautions   Weight Bearing Precautions Per Order No   Other Precautions Fall Risk;O2   Pain Assessment   Pain Assessment No/denies pain   Home Living   Type of [address] [place] [address] One level  (2 joao)   Bathroom Shower/Tub Walk-in shower   Bathroom Toilet Standard   Home Equipment Walker   Prior Function   Lives With Alone   ADL Assistance Independent   Falls in the last 6 months 1 to 4  (1)   Lifestyle   Autonomy PTA pt states independence with all aspects of her ADLs, transfers, ambulation--w/o device, +, +falls=1   Reciprocal Relationships 1 dtr, not local   Service to Others works for [employer] --fulltime   Intrinsic Gratification watching TV   Psychosocial   Psychosocial (WDL) WDL   ADL   Where Assessed Edge of bed   Eating Assistance 6  Modified independent   [address]  [address] 4  Minimal Assistance   Additional Comments SPO2=88% on RA   Bed Mobility   Rolling R 5  Supervision   Additional items Assist x 1; Increased time required;Verbal cues   Rolling L 5  Supervision   Additional items Increased time required;Verbal cues   Supine to Sit 5  Supervision   Additional items Increased time required;Verbal cues   Sit to Supine 5  Supervision   Additional items Assist x 1; Increased time required   Transfers   Sit to Stand 5  Supervision   Additional items Verbal cues   Stand to Sit 5  Supervision   Additional items Verbal cues   Functional Mobility   Functional Mobility 5  Supervision   Additional items   (w/o device)   Balance   Static Sitting Good   Dynamic Sitting Fair +   Static Standing Fair   Dynamic Standing Fair -   Activity Tolerance   Activity Tolerance Patient limited by fatigue   Medical Staff Made Aware nsg, P T     RUE Assessment   RUE Assessment WFL   RUE Strength   RUE Overall Strength   (shr=3+/5, elbow-distal=4+/5;hx RTC injury ?)   LUE Assessment   LUE Assessment WFL   LUE Strength   LUE Overall Strength   (4+/5 throughout)   Hand Function   Gross Motor Coordination Functional   Fine Motor Coordination Functional   Sensation   Light Touch No apparent deficits   Proprioception   Proprioception No apparent deficits   Vision-Basic Assessment   Current Vision Wears glasses only for reading   Vision - Complex Assessment   Acuity Able to read clock/calendar on wall without difficulty   Perception   Inattention/Neglect Appears intact   Cognition   Overall Cognitive Status WFL   Arousal/Participation Alert   Attention Within functional limits   Orientation Level Oriented X4   Memory Within functional limits   Following Commands Follows one step commands without difficulty   Assessment   Limitation Decreased ADL status; Decreased UE strength;Decreased endurance   Prognosis Good   Assessment Pt is a 68y/o female x-ferred from [facility] 2* SOB and A-fib  Pt was at [address] 2* PNA, sepsis  Pt required a s/p cardioversion(8/30)  PTA pt states independence with all aspects of his ADLs, transfers, ambulation--w/o device; +falls=1, neg home alone, +   During initial eval, pt demonstrated slight deficits with her functional balance, functional mobility, ADL status, activity tolerance(currently fair=15-20mins), and R UE strength  Pt would benefit from continued OT tx for the above deficits  3-5xwk/1-2wks   Goals   Patient Goals "to be able to go home "   STG Time Frame 3-5   Short Term Goal #1 Pt will demonstrate improved activity tolerance to good(20-30mins) and standing tolerance to 3-5mins to assist with ADLs  Short Term Goal #2 Pt will demonstrate proper walker/transfer safety 100% of the time  Short Term Goal  Pt will independently demonstrate knowledge and application of proper energy conservation techniques 100% of the time  LTG Time Frame   (5-10days)   Long Term Goal #1 Pt will demonstrate g/g- balance with all functional activities  Long Term Goal #2 Pt will demonstrate mod I with their UE and LE bathing/dresssing  Long Term Goal Pt will demonstrate mod I with their sit-stand transfers to assist with completion of their LE dressing  Plan   Treatment Interventions ADL retraining;Functional transfer training;UE strengthening/ROM; Endurance training;Patient/family training;Equipment evaluation/education; Compensatory technique education   Goal Expiration Date 09/09/19   Treatment Day 0   OT Frequency 3-5x/wk   Recommendation   OT Discharge Recommendation Home with family support  (home vs  oupt P T  )   Barthel Index   Feeding 10   Bathing 0   Grooming Score 5   Dressing Score 5   Bladder Score 10   Bowels Score 10   Toilet Use Score 10   Transfers (Bed/Chair) Score 10   Mobility (Level Surface) Score 0   Stairs Score 0   Barthel Index Score 60   James Conrad, OT

## 2019-08-30 NOTE — PLAN OF CARE
Problem: Potential for Falls  Goal: Patient will remain free of falls  Description  INTERVENTIONS:  - Assess patient frequently for physical needs  -  Identify cognitive and physical deficits and behaviors that affect risk of falls    -  [place] fall precautions as indicated by assessment   - Educate patient/family on patient safety including physical limitations  - Instruct patient to call for assistance with activity based on assessment  - Modify environment to reduce risk of injury  - Consider OT/PT consult to assist with strengthening/mobility  8/30/2019 1956 by Gilles Sánchez RN  Outcome: Progressing  8/30/2019 1956 by Gilles Sánchez RN  Outcome: Progressing     Problem: PAIN - ADULT  Goal: Verbalizes/displays adequate comfort level or baseline comfort level  Description  Interventions:  - Encourage patient to monitor pain and request assistance  - Assess pain using appropriate pain scale  - Administer analgesics based on type and severity of pain and evaluate response  - Implement non-pharmacological measures as appropriate and evaluate response  - Consider cultural and social influences on pain and pain management  - Notify physician/advanced practitioner if interventions unsuccessful or patient reports new pain  8/30/2019 1956 by Gilles Sánchez RN  Outcome: Progressing  8/30/2019 1956 by Gilles Sánchez RN  Outcome: Progressing     Problem: SAFETY ADULT  Goal: Maintain or return to baseline ADL function  Description  INTERVENTIONS:  -  Assess patient's ability to carry out ADLs; assess patient's baseline for ADL function and identify physical deficits which impact ability to perform ADLs (bathing, care of mouth/teeth, toileting, grooming, dressing, etc )  - Assess/evaluate cause of self-care deficits   - Assess range of motion  - Assess patient's mobility; develop plan if impaired  - Assess patient's need for assistive devices and provide as appropriate  - Encourage maximum independence but intervene and supervise when necessary  - Involve family in performance of ADLs  - Assess for home care needs following discharge   - Consider OT consult to assist with ADL evaluation and planning for discharge  - Provide patient education as appropriate  8/30/2019 1956 by Anner Hashimoto, RN  Outcome: Progressing  8/30/2019 1956 by Anner Hashimoto, RN  Outcome: Progressing  Goal: Maintain or return mobility status to optimal level  Description  INTERVENTIONS:  - Assess patient's baseline mobility status (ambulation, transfers, stairs, etc )    - Identify cognitive and physical deficits and behaviors that affect mobility  - Identify mobility aids required to assist with transfers and/or ambulation (gait belt, sit-to-stand, lift, walker, cane, etc )  - [facility] fall precautions as indicated by assessment  - Record patient progress and toleration of activity level on Mobility SBAR; progress patient to next Phase/Stage  - Instruct patient to call for assistance with activity based on assessment  - Consider rehabilitation consult to assist with strengthening/weightbearing, etc   8/30/2019 1956 by Anner Hashimoto, RN  Outcome: Progressing  8/30/2019 1956 by Anner Hashimoto, RN  Outcome: Progressing     Problem: DISCHARGE PLANNING  Goal: Discharge to home or other facility with appropriate resources  Description  INTERVENTIONS:  - Identify barriers to discharge w/patient and caregiver  - Arrange for needed discharge resources and transportation as appropriate  - Identify discharge learning needs (meds, wound care, etc )  - Arrange for interpretive services to assist at discharge as needed  - Refer to Case Management Department for coordinating discharge planning if the patient needs post-hospital services based on physician/advanced practitioner order or complex needs related to functional status, cognitive ability, or social support system  8/30/2019 1956 by Anner Hashimoto, RN  Outcome: Progressing  8/30/2019 1956 by Anner Hashimoto, RN  Outcome: Progressing     Problem: Knowledge Deficit  Goal: Patient/family/caregiver demonstrates understanding of disease process, treatment plan, medications, and discharge instructions  Description  Complete learning assessment and assess knowledge base  Interventions:  - Provide teaching at level of understanding  - Provide teaching via preferred learning methods  8/30/2019 1956 by Gilles Sánchez RN  Outcome: Progressing  8/30/2019 1956 by Gilles Sánchez RN  Outcome: Progressing     Problem: Nutrition/Hydration-ADULT  Goal: Nutrient/Hydration intake appropriate for improving, restoring or maintaining nutritional needs  Description  Monitor and assess patient's nutrition/hydration status for malnutrition  Collaborate with interdisciplinary team and initiate plan and interventions as ordered  Monitor patient's weight and dietary intake as ordered or per policy  Utilize nutrition screening tool and intervene as necessary  Determine patient's food preferences and provide low sodium foods as appropriate       INTERVENTIONS:  - Monitor oral intake, urinary output, labs, and treatment plans  - Assess nutrition and hydration status and recommend course of action  - Evaluate amount of meals eaten  - Assist patient with eating if necessary   - Allow adequate time for meals  - Recommend/ encourage appropriate diets, oral nutritional supplements, and vitamin/mineral supplements  - Order, calculate, and assess calorie counts as needed  - Recommend, monitor, and adjust tube feedings and TPN/PPN based on assessed needs  - Assess need for intravenous fluids  - Provide specific nutrition/hydration education as appropriate  - Include patient/family/caregiver in decisions related to nutrition   8/30/2019 1956 by Gilles Sánchez RN  Outcome: Progressing  8/30/2019 1956 by Gilles Sánchez RN  Outcome: Progressing

## 2019-08-30 NOTE — CONSULTS
Cardiology   Shanti Trammell 78 y o  female MRN: [id number]  Unit/Bed#: E4 -01 Encounter: [id number]      Assessment and plan:  1  Atrial fibrillation with accelerated ventricular rate: Currently on high-dose AV blocking medications with continue tachycardia   -SUSIE cardioversion advised and discussed with the patient who was agreeable ~~> will proceed today   -Will reassess sinus rate after cardioversion as patient may be able to have some reduction in her current regimen of AV blocking meds (d/c digoxin with consideration to exclude BB or CCB)   -Continue Eliquis anticoagulation  2  Hypertension:  Controlled    -Presently tolerant of regimen of Toprol 100 mg b i d , Cardizem 120 mg q 8 hours, and Lasix (previous home regimen = amlodipine/benazepril-10/40)  3  Possible multifocal pneumonia:   -Was followed by pulmonology prior to transfer to this campus noting brisk clinical improvement, it negative procalcitonin, resolution of leukocytosis and negative cultures  Antibiotics Okay to be discontinued from pulmonary perspective but were ongoing with suspicion of possible UTI  4  Acute diastolic CHF:   -Seems improved clinically    -Will repeat proBNP and await echocardiogram for additional signs of potential volume overload suspecting she can be transitioned to a lower dose of diuretic  5  Obesity  6  Probable obstructive sleep apnea:   -Patient should have outpatient sleep study       HPI:  This woman is a 69-year-old with cardiac history previously limited to hypertension and dyslipidemia  On 08/26 she presented to the emergency department at the [address] [place] [address] ,[address] Floor Unit reporting a 2 week history of dyspnea and increased effort intolerance  She is found have atrial fibrillation with accelerated ventricular rate    She was seen for cardiology consultation by Dr Suyapa Stahl, placed on anticoagulation, AV blocking medications with subsequent upward titration to maximum levels (Cardizem 120 mg q 8 hours +metoprolol 100 mg b i d  +digoxin 0 125 daily)  Despite this the patient has a resting rate in the 100s accelerating with activity and associated symptom  Because of this it was advised that the patient undergo cardioversion  As this would require concomitant transesophageal echocardiogram she was transferred here to the [facility] where I am seeing the patient  At the time my visit the patient is comfortable denying any dyspnea, recognition of her tachycardia, or chest discomfort  She has not previously had any sort of endoscopy  She denies any swallowing dysfunction, hemoptysis, hematemesis, liver disease, other bleeding disorders  EXAM  General:  Alert normally conversant and comfortable-appearing  Visually appears to have BMI of obesity  Oropharynx:  Class 2 airway  Slightly dry mucosa without visible lesion  Heart:  Irregular with controlled rate and no pathologic murmur or rub  Lungs:  Modest excursion air exchange though no auscultatory signs of rales, rhonchi, or wheeze  Abdomen:  Protuberant, soft and nontender with normoactive bowel sounds  No palpable organomegaly or mass  Musculoskeletal: Independent movement of limbs observed, Formal ROM and strength eval not performed  Neurologic:    Oriented to: person , place, situation  Cranial Nerves: grossly intact - vision, smell, taste, and hearing  were not tested  Motor function:grossly normal, symmetric   Sensation: Was not tested  Lower Limbs:  No edema    Echocardiogram: Transthoracic-8/26/2019, [facility],[address] Floor  SUMMARY   LEFT VENTRICLE:  Systolic function was normal  Ejection fraction was estimated to be 60 %  There were no regional wall motion abnormalities      RIGHT VENTRICLE:  Wall thickness was mildly increased      MITRAL VALVE:  There was mild regurgitation

## 2019-08-31 VITALS
OXYGEN SATURATION: 96 % | TEMPERATURE: 97.7 F | HEART RATE: 57 BPM | WEIGHT: 277.9 LBS | DIASTOLIC BLOOD PRESSURE: 68 MMHG | RESPIRATION RATE: 18 BRPM | SYSTOLIC BLOOD PRESSURE: 149 MMHG | HEIGHT: 62 IN | BODY MASS INDEX: 51.14 KG/M2

## 2019-08-31 LAB
ANION GAP SERPL CALCULATED.3IONS-SCNC: 6 MMOL/L (ref 4–13)
BACTERIA BLD CULT: NORMAL
BACTERIA BLD CULT: NORMAL
BUN SERPL-MCNC: 26 MG/DL (ref 5–25)
CALCIUM SERPL-MCNC: 8.8 MG/DL (ref 8.3–10.1)
CHLORIDE SERPL-SCNC: 107 MMOL/L (ref 100–108)
CO2 SERPL-SCNC: 31 MMOL/L (ref 21–32)
CREAT SERPL-MCNC: 0.94 MG/DL (ref 0.6–1.3)
GFR SERPL CREATININE-BSD FRML MDRD: 62 ML/MIN/1.73SQ M
GLUCOSE SERPL-MCNC: 112 MG/DL (ref 65–140)
MAGNESIUM SERPL-MCNC: 2.5 MG/DL (ref 1.6–2.6)
NT-PROBNP SERPL-MCNC: 1682 PG/ML
POTASSIUM SERPL-SCNC: 4.2 MMOL/L (ref 3.5–5.3)
SODIUM SERPL-SCNC: 144 MMOL/L (ref 136–145)

## 2019-08-31 PROCEDURE — 99239 HOSP IP/OBS DSCHRG MGMT >30: CPT | Performed by: HOSPITALIST

## 2019-08-31 PROCEDURE — 80048 BASIC METABOLIC PNL TOTAL CA: CPT | Performed by: PHYSICIAN ASSISTANT

## 2019-08-31 PROCEDURE — 99233 SBSQ HOSP IP/OBS HIGH 50: CPT | Performed by: INTERNAL MEDICINE

## 2019-08-31 PROCEDURE — G8979 MOBILITY GOAL STATUS: HCPCS

## 2019-08-31 PROCEDURE — 83735 ASSAY OF MAGNESIUM: CPT | Performed by: PHYSICIAN ASSISTANT

## 2019-08-31 PROCEDURE — G8980 MOBILITY D/C STATUS: HCPCS

## 2019-08-31 PROCEDURE — G8978 MOBILITY CURRENT STATUS: HCPCS

## 2019-08-31 PROCEDURE — 83880 ASSAY OF NATRIURETIC PEPTIDE: CPT | Performed by: PHYSICIAN ASSISTANT

## 2019-08-31 PROCEDURE — 97163 PT EVAL HIGH COMPLEX 45 MIN: CPT

## 2019-08-31 RX ORDER — FUROSEMIDE 40 MG/1
40 TABLET ORAL DAILY
Qty: 30 TABLET | Refills: 1 | Status: SHIPPED | OUTPATIENT
Start: 2019-08-31 | End: 2019-09-12 | Stop reason: ALTCHOICE

## 2019-08-31 RX ORDER — LISINOPRIL 5 MG/1
20 TABLET ORAL DAILY
Qty: 30 TABLET | Refills: 1 | Status: SHIPPED | OUTPATIENT
Start: 2019-08-31 | End: 2019-08-31 | Stop reason: HOSPADM

## 2019-08-31 RX ORDER — METOPROLOL SUCCINATE 25 MG/1
12.5 TABLET, EXTENDED RELEASE ORAL DAILY
Qty: 15 TABLET | Refills: 1 | Status: SHIPPED | OUTPATIENT
Start: 2019-08-31 | End: 2019-10-31 | Stop reason: ALTCHOICE

## 2019-08-31 RX ORDER — POTASSIUM CHLORIDE 20 MEQ/1
20 TABLET, EXTENDED RELEASE ORAL DAILY
Qty: 30 TABLET | Refills: 1 | Status: SHIPPED | OUTPATIENT
Start: 2019-08-31 | End: 2019-09-12 | Stop reason: ALTCHOICE

## 2019-08-31 RX ORDER — LISINOPRIL 5 MG/1
10 TABLET ORAL DAILY
Qty: 30 TABLET | Refills: 1 | Status: SHIPPED | OUTPATIENT
Start: 2019-08-31 | End: 2019-08-31 | Stop reason: HOSPADM

## 2019-08-31 RX ORDER — LISINOPRIL 20 MG/1
20 TABLET ORAL DAILY
Qty: 30 TABLET | Refills: 1 | Status: SHIPPED | OUTPATIENT
Start: 2019-08-31 | End: 2019-10-31 | Stop reason: ALTCHOICE

## 2019-08-31 RX ADMIN — APIXABAN 5 MG: 5 TABLET, FILM COATED ORAL at 12:08

## 2019-08-31 RX ADMIN — LEVOFLOXACIN 750 MG: 750 TABLET, FILM COATED ORAL at 12:08

## 2019-08-31 RX ADMIN — ESCITALOPRAM OXALATE 20 MG: 20 TABLET ORAL at 12:08

## 2019-08-31 NOTE — PLAN OF CARE
Problem: Potential for Falls  Goal: Patient will remain free of falls  Description  INTERVENTIONS:  - Assess patient frequently for physical needs  -  Identify cognitive and physical deficits and behaviors that affect risk of falls    -  [place] fall precautions as indicated by assessment   - Educate patient/family on patient safety including physical limitations  - Instruct patient to call for assistance with activity based on assessment  - Modify environment to reduce risk of injury  - Consider OT/PT consult to assist with strengthening/mobility  Outcome: Progressing     Problem: PAIN - ADULT  Goal: Verbalizes/displays adequate comfort level or baseline comfort level  Description  Interventions:  - Encourage patient to monitor pain and request assistance  - Assess pain using appropriate pain scale  - Administer analgesics based on type and severity of pain and evaluate response  - Implement non-pharmacological measures as appropriate and evaluate response  - Consider cultural and social influences on pain and pain management  - Notify physician/advanced practitioner if interventions unsuccessful or patient reports new pain  Outcome: Progressing     Problem: SAFETY ADULT  Goal: Maintain or return to baseline ADL function  Description  INTERVENTIONS:  -  Assess patient's ability to carry out ADLs; assess patient's baseline for ADL function and identify physical deficits which impact ability to perform ADLs (bathing, care of mouth/teeth, toileting, grooming, dressing, etc )  - Assess/evaluate cause of self-care deficits   - Assess range of motion  - Assess patient's mobility; develop plan if impaired  - Assess patient's need for assistive devices and provide as appropriate  - Encourage maximum independence but intervene and supervise when necessary  - Involve family in performance of ADLs  - Assess for home care needs following discharge   - Consider OT consult to assist with ADL evaluation and planning for discharge  - Provide patient education as appropriate  Outcome: Progressing  Goal: Maintain or return mobility status to optimal level  Description  INTERVENTIONS:  - Assess patient's baseline mobility status (ambulation, transfers, stairs, etc )    - Identify cognitive and physical deficits and behaviors that affect mobility  - Identify mobility aids required to assist with transfers and/or ambulation (gait belt, sit-to-stand, lift, walker, cane, etc )  - [place] fall precautions as indicated by assessment  - Record patient progress and toleration of activity level on Mobility SBAR; progress patient to next Phase/Stage  - Instruct patient to call for assistance with activity based on assessment  - Consider rehabilitation consult to assist with strengthening/weightbearing, etc   Outcome: Progressing     Problem: DISCHARGE PLANNING  Goal: Discharge to home or other facility with appropriate resources  Description  INTERVENTIONS:  - Identify barriers to discharge w/patient and caregiver  - Arrange for needed discharge resources and transportation as appropriate  - Identify discharge learning needs (meds, wound care, etc )  - Arrange for interpretive services to assist at discharge as needed  - Refer to Case Management Department for coordinating discharge planning if the patient needs post-hospital services based on physician/advanced practitioner order or complex needs related to functional status, cognitive ability, or social support system  Outcome: Progressing     Problem: Knowledge Deficit  Goal: Patient/family/caregiver demonstrates understanding of disease process, treatment plan, medications, and discharge instructions  Description  Complete learning assessment and assess knowledge base    Interventions:  - Provide teaching at level of understanding  - Provide teaching via preferred learning methods  Outcome: Progressing     Problem: Nutrition/Hydration-ADULT  Goal: Nutrient/Hydration intake appropriate for improving, restoring or maintaining nutritional needs  Description  Monitor and assess patient's nutrition/hydration status for malnutrition  Collaborate with interdisciplinary team and initiate plan and interventions as ordered  Monitor patient's weight and dietary intake as ordered or per policy  Utilize nutrition screening tool and intervene as necessary  Determine patient's food preferences and provide low sodium foods as appropriate       INTERVENTIONS:  - Monitor oral intake, urinary output, labs, and treatment plans  - Assess nutrition and hydration status and recommend course of action  - Evaluate amount of meals eaten  - Assist patient with eating if necessary   - Allow adequate time for meals  - Recommend/ encourage appropriate diets, oral nutritional supplements, and vitamin/mineral supplements  - Order, calculate, and assess calorie counts as needed  - Recommend, monitor, and adjust tube feedings and TPN/PPN based on assessed needs  - Assess need for intravenous fluids  - Provide specific nutrition/hydration education as appropriate  - Include patient/family/caregiver in decisions related to nutrition   Outcome: Progressing     Problem: CARDIOVASCULAR - ADULT  Goal: Maintains optimal cardiac output and hemodynamic stability  Description  INTERVENTIONS:  - Monitor I/O, vital signs and rhythm  - Monitor for S/S and trends of decreased cardiac output  - Administer and titrate ordered vasoactive medications to optimize hemodynamic stability  - Assess quality of pulses, skin color and temperature  - Assess for signs of decreased coronary artery perfusion  - Instruct patient to report change in severity of symptoms  Outcome: Progressing  Goal: Absence of cardiac dysrhythmias or at baseline rhythm  Description  INTERVENTIONS:  - Continuous cardiac monitoring, vital signs, obtain 12 lead EKG if ordered  - Administer antiarrhythmic and heart rate control medications as ordered  - Monitor electrolytes and administer replacement therapy as ordered  Outcome: Progressing     Problem: RESPIRATORY - ADULT  Goal: Achieves optimal ventilation and oxygenation  Description  INTERVENTIONS:  - Assess for changes in respiratory status  - Assess for changes in mentation and behavior  - Position to facilitate oxygenation and minimize respiratory effort  - Oxygen administered by appropriate delivery if ordered  - Initiate smoking cessation education as indicated  - Encourage broncho-pulmonary hygiene including cough, deep breathe, Incentive Spirometry  - Assess the need for suctioning and aspirate as needed  - Assess and instruct to report SOB or any respiratory difficulty  - Respiratory Therapy support as indicated  Outcome: Progressing     Problem: MUSCULOSKELETAL - ADULT  Goal: Maintain or return mobility to safest level of function  Description  INTERVENTIONS:  - Assess patient's ability to carry out ADLs; assess patient's baseline for ADL function and identify physical deficits which impact ability to perform ADLs (bathing, care of mouth/teeth, toileting, grooming, dressing, etc )  - Assess/evaluate cause of self-care deficits   - Assess range of motion  - Assess patient's mobility  - Assess patient's need for assistive devices and provide as appropriate  - Encourage maximum independence but intervene and supervise when necessary  - Involve family in performance of ADLs  - Assess for home care needs following discharge   - Consider OT consult to assist with ADL evaluation and planning for discharge  - Provide patient education as appropriate  Outcome: Progressing  Goal: Maintain proper alignment of affected body part  Description  INTERVENTIONS:  - Support, maintain and protect limb and body alignment  - Provide patient/ family with appropriate education  Outcome: Progressing

## 2019-08-31 NOTE — DISCHARGE SUMMARY
Discharge- Greg Horton 1950, 78 y o  female MRN: [id number]    Unit/Bed#: E4 -01 Encounter: [id number]    Primary Care Provider: Belkys Moulton MD   Date and time admitted to hospital: 8/29/2019  4:38 PM        * Atrial fibrillation with rapid ventricular response [facility])  Assessment & Plan  Had DCCV yesterday  Converted to NSR  Doing well  Cardiology recommends Eliquis and low dose Toprol XL  She also has a lot of lower extremity edema likely due to the uncontrolled HR  Will send home on daily lasix, but hopefully that can be discontinued once she is euvolemic    Acute respiratory failure with hypoxia [facility])  Assessment & Plan  Due to pneumonia  Completed course of abx      Discharging Physician / Practitioner: Linsey Greene DO  PCP: Belkys Moulton MD  Admission Date:   Admission Orders (From admission, onward)     Ordered        08/30/19 1051  Inpatient Admission  Once         08/29/19 1725  Place in Observation  Once                   Discharge Date: 08/31/19    Resolved Problems  Date Reviewed: 8/31/2019    None            Consultations During Hospital Stay:  · Cardiology      Procedures Performed:     · DC Cardioversion      Reason for Admission: rapid atrial fibrillation       Hospital Course:     Greg Horton is a 78 y o  female patient who originally presented to the hospital on 8/29/2019 due to rapid atrial fibrillation  Patient was transferred from [facility] with rapid atrial fibrillation  She had been admitted there for several days with pneumonia and new rapid atrial fibrillaiton  She was transferred to [address] [place] [address] for DCCV  She underwent the procedure and did very well  Initially she had sinus bradycardia, so diltiazem was discontinued  HR came up to 70, so cardiology recommended a low dose of Toprol XL  Patient is also fluid overloaded likely due to the rapid afib, so she will go home on daily lasix  Hopefully this can be discontinued after a few weeks  We recommend PCP check BMP, BP and HR in a week  Please see above list of diagnoses and related plan for additional information  Condition at Discharge: good       Discharge Day Visit / Exam:     Subjective:  Feels better  No sob  No chest pain  Vitals: Blood Pressure: 149/68 (08/31/19 0757)  Pulse: 57 (08/31/19 0757)  Temperature: 97 7 °F (36 5 °C) (08/31/19 0757)  Temp Source: Temporal (08/31/19 0757)  Respirations: 18 (08/31/19 0757)  Height: 5' 2" (157 5 cm) (08/29/19 1703)  Weight - Scale: 126 kg (277 lb 14 4 oz) (08/31/19 0600)  SpO2: 96 % (08/31/19 0757)    Exam:     Physical Exam   HENT:   Head: Normocephalic and atraumatic  Eyes: Pupils are equal, round, and reactive to light  EOM are normal    Cardiovascular: Normal rate and regular rhythm  Exam reveals no gallop and no friction rub  No murmur heard  Pulmonary/Chest: Effort normal and breath sounds normal  She has no wheezes  She has no rales  Abdominal: Soft  Bowel sounds are normal  There is no tenderness  Musculoskeletal: She exhibits no edema  Nursing note and vitals reviewed            Discharge instructions/Information to patient and family:   See after visit summary for information provided to patient and family  Provisions for Follow-Up Care:  See after visit summary for information related to follow-up care and any pertinent home health orders  Disposition:     Home       Discharge Statement:  I spent 42 minutes discharging the patient  This time was spent on the day of discharge  I had direct contact with the patient on the day of discharge  Greater than 50% of the total time was spent examining patient, answering all patient questions, arranging and discussing plan of care with patient as well as directly providing post-discharge instructions  Additional time then spent on discharge activities      Discharge Medications:  See after visit summary for reconciled discharge medications provided to patient and family        ** Please Note: This note has been constructed using a voice recognition system **

## 2019-08-31 NOTE — ASSESSMENT & PLAN NOTE
Had DCCV yesterday  Converted to NSR  Doing well  Cardiology recommends Eliquis and low dose Toprol XL  She also has a lot of lower extremity edema likely due to the uncontrolled HR    Will send home on daily lasix, but hopefully that can be discontinued once she is euvolemic

## 2019-08-31 NOTE — PROGRESS NOTES
Progress Note - Electrophysiology-Cardiology (EP)   Saroj Canales 78 y o  female MRN: [id number]  Unit/Bed#: E4 -01 Encounter: [id number]    Assessment and Plan:  1  New onset of AFib with RVR  -difficult to control rates with medications  -status post SUSIE/DC cardioversion on 08/30/2019  -EF 60%  -heart rate in 50s to 70s of any beta-blocker  She was on Toprol  mg a however that was discontinued after cardioversion   -on anticoagulation with Eliquis  -would recommend starting small dose of beta-blocker-Toprol XL 12 5 daily as patient was tachycardic with atrial fibrillation on arrival  -discuss the further management options including antiarrhythmics and ablation therapy as well as pacemaker for possible tachy-boni   -she will follow-up with the cardiologist locally and will follow up with EP in [facility] if she is interested in further therapy  2  Acute respiratory failure with hypoxia  3  Hypertension   -controlled blood pressure  4  Morbid obesity  5  Depression    Subjective/Objective   Denies any chest pain, shortness of breath, palpitation  Still reports having lower extremity swelling  Review of Systems:   Review of system negative except stated above      Vitals: /68 (BP Location: Right arm)   Pulse 57   Temp 97 7 °F (36 5 °C) (Temporal)   Resp 18   Ht 5' 2" (1 575 m)   Wt 126 kg (277 lb 14 4 oz)   SpO2 96%   BMI 50 83 kg/m²     Vitals:    08/30/19 0600 08/31/19 0600   Weight: 125 kg (275 lb 9 2 oz) 126 kg (277 lb 14 4 oz)       Orthostatic Blood Pressures      Most Recent Value   Blood Pressure  149/68 filed at 08/31/2019 0757   Patient Position - Orthostatic VS  Lying filed at 08/31/2019 0757            Intake/Output Summary (Last 24 hours) at 8/31/2019 0911  Last data filed at 8/31/2019 0606  Gross per 24 hour   Intake 400 ml   Output 1100 ml   Net -700 ml       Invasive Devices     Peripheral Intravenous Line            Peripheral IV 08/26/19 Left Forearm 5 days    Peripheral IV 08/26/19 Right Wrist 4 days              Objective:   /68 (BP Location: Right arm)   Pulse 57   Temp 97 7 °F (36 5 °C) (Temporal)   Resp 18   Ht 5' 2" (1 575 m)   Wt 126 kg (277 lb 14 4 oz)   SpO2 96%   BMI 50 83 kg/m²    Physical Exam:  GEN: NAD, Alert and oriented, well appearing  Morbidly obese  HEENT:Head, neck, ears, oral pharynx: Mucus membranes moist, oral pharynx clear, nares clear  External ears normal  EYES: Pupils equal, sclera anicteric  NECK: No JVD  CARDIOVASCULAR: RRR, No murmur, rub, gallops S1,S2  LUNGS: Clear To auscultation bilaterally  ABDOMEN: Soft, nondistended  EXTREMITIES/VASCULAR:  +2 edema bilateral lower extremity    PSYCH: Normal Affect  NEURO: Grossly intact, moving all extremiteis equal, face symetric  HEME: No bleeding, bruising, petechia  SKIN: No significant rashes     Medications:   Medication Administration - last 24 hours from 08/30/2019 0911 to 08/31/2019 3011       Date/Time Order Dose Route Action Action by     08/30/2019 1734 atorvastatin (LIPITOR) tablet 40 mg 40 mg Oral Given Kaleigh Isaacs RN     08/30/2019 1734 apixaban (ELIQUIS) tablet 5 mg 5 mg Oral Given Kaleigh Isaacs RN     08/30/2019 1732 docusate sodium (COLACE) capsule 100 mg 100 mg Oral Refused Kaleigh Isaacs RN     08/30/2019 2257 melatonin tablet 3 mg 3 mg Oral Given Heather Sesay RN     08/30/2019 1042 metoprolol succinate (TOPROL-XL) 24 hr tablet 100 mg 100 mg Oral Given Kaleigh Isaacs RN     08/30/2019 1316 furosemide (LASIX) injection 40 mg 40 mg Intravenous Not Given Kaleigh Isaacs RN     08/30/2019 1313 sodium chloride 0 9 % infusion 0 mL/hr  General Reuben RN     08/30/2019 1238 sodium chloride 0 9 % infusion    Continued from [address], [place]EL[place]     08/30/2019 1232 sodium chloride 0 9 % infusion   Intravenous Stopped Tony Richey MD     08/30/2019 1147 sodium chloride 0 9 % infusion   Intravenous New Bag Tony Richey MD            Current Facility-Administered Medications:   [address] [place] [address] acetaminophen (TYLENOL) tablet 650 mg, 650 mg, Oral, Q6H PRN, Joey Jacquesiter, DO    apixaban (ELIQUIS) tablet 5 mg, 5 mg, Oral, BID, Alvin Prechtel, DO, 5 mg at 08/30/19 1734    atorvastatin (LIPITOR) tablet 40 mg, 40 mg, Oral, Daily With Gurmeet Turcios Prechtel, DO, 40 mg at 08/30/19 1734    diphenhydrAMINE (BENADRYL) tablet 25 mg, 25 mg, Oral, HS PRN, Joey Jacquesiter, DO    docusate sodium (COLACE) capsule 100 mg, 100 mg, Oral, BID, Alvin Prechtel, DO    escitalopram (LEXAPRO) tablet 20 mg, 20 mg, Oral, Daily, Alvin Prechtel, DO, 20 mg at 08/30/19 0826    furosemide (LASIX) injection 40 mg, 40 mg, Intravenous, BID (diuretic), Rujul Flor, DO    levofloxacin (LEVAQUIN) tablet 750 mg, 750 mg, Oral, Q24H, Alvin Prechtel, DO, 750 mg at 08/30/19 0826    melatonin tablet 3 mg, 3 mg, Oral, HS, Alvin Prechtel, DO, 3 mg at 08/30/19 2257    metoprolol (LOPRESSOR) injection 5 mg, 5 mg, Intravenous, Q6H PRN, Ema Media Prechtel, DO    ondansetron (ZOFRAN) injection 4 mg, 4 mg, Intravenous, Q6H PRN, Joey Mantilla,     Lab Results:   CBC with diff:   Lab Results   Component Value Date    WBC 9 63 08/30/2019    HGB 13 6 08/30/2019    HCT 43 0 08/30/2019    MCV 93 08/30/2019     (H) 08/30/2019    MCH 29 5 08/30/2019    MCHC 31 6 08/30/2019    RDW 13 0 08/30/2019    MPV 9 2 08/30/2019    NRBC 0 08/30/2019       CMP:  Lab Results   Component Value Date    K 4 2 08/31/2019     08/31/2019    CO2 31 08/31/2019    BUN 26 (H) 08/31/2019    CREATININE 0 94 08/31/2019    CALCIUM 8 8 08/31/2019    AST 18 08/27/2019    ALT 22 08/27/2019    ALKPHOS 116 08/27/2019    EGFR 62 08/31/2019       BMP:  Results from last 7 days   Lab Units 08/31/19  0514 08/30/19  0527 08/29/19  0452   POTASSIUM mmol/L 4 2 3 5 3 4*   CHLORIDE mmol/L 107 104 105   CO2 mmol/L 31 30 28   BUN mg/dL 26* 25 24   CREATININE mg/dL 0 94 1 05 0 76   CALCIUM mg/dL 8 8 8 9 8 8       Magnesium:   Lab Results   Component Value Date    MG 2 5 08/31/2019       CPK:       Troponin:   Lab Results   Component Value Date    TROPONINI <0 03 08/26/2019       BNP:   Lab Results   Component Value Date     (H) 08/26/2019       Coags:   Lab Results   Component Value Date    PTT 34 08/26/2019    INR 1 26 08/26/2019       TSH: No results found for: TSH    Lipid Profile: No results found for: LABLIPI      Imaging:  Xr Chest Portable    Result Date: 8/29/2019  Narrative: CHEST INDICATION:   hypoxia  COMPARISON:  Chest radiographs August 26, 2019 and CT pulmonary angiogram August 26, 2019  EXAM PERFORMED/VIEWS:  XR CHEST PORTABLE  AP semierect Images: 2 FINDINGS: The heart is enlarged  Atherosclerotic changes in the aorta  Lung volumes diminished  Bilateral pleural effusions,, slightly increased  Janie and pulmonary vessels are prominent  Bilateral perihilar and lower lobe infiltrates improved  Osseous structures appear within normal limits for patient age  Impression: Mild improvement in the overall appearance of the chest, suggesting resolving vascular congestion and improving multifocal pneumonia  Workstation performed: [id number]     Xr Chest 1 View Portable    Result Date: 8/26/2019  Narrative: CHEST INDICATION:   shortness of breath, afib rvr  COMPARISON:  None EXAM PERFORMED/VIEWS:  XR CHEST PORTABLE FINDINGS: There is cardiomegaly  There seem to be right greater than left bibasilar opacities which could be bilateral pneumonia or bibasilar edema  Correlate for any fever  There might be trace amounts of pleural fluid  No pneumothorax or mediastinal shift  Osseous structures appear within normal limits for patient age  Impression: Bibasilar opacities could be pneumonia or bibasilar edema  Suggestion of trace amount of pleural fluid as well  Cardiomegaly noted   Workstation performed: [id number]     [facility] Ed Chest Pe Study    Result Date: 8/26/2019  Narrative: CTA - CHEST WITH IV CONTRAST - PULMONARY ANGIOGRAM INDICATION:   Shortness of breath elevated ddimer, sob, hypoxia  COMPARISON: None  TECHNIQUE: CTA examination of the chest was performed using angiographic technique according to a protocol specifically tailored to evaluate for pulmonary embolism  Axial, sagittal, and coronal 2D reformatted images were created from the source data and  submitted for interpretation  In addition, coronal 3D MIP postprocessing was performed on the acquisition scanner  Radiation dose length product (DLP) for this visit:  668 mGy-cm   This examination, like all CT scans performed in the [facility], was performed utilizing techniques to minimize radiation dose exposure, including the use of iterative reconstruction and automated exposure control  IV Contrast:  85 mL of iohexol (OMNIPAQUE)  FINDINGS: PULMONARY ARTERIAL TREE: Evaluation is somewhat limited due to respiratory motion artifact, which degrades images  No definite pulmonary embolus is seen  LUNGS:  Slightly limited study due to respiratory motion artifact  There is bilateral multi lobar consolidation, most compatible with pneumonia  There is no tracheal or endobronchial lesion  PLEURA:  Small bilateral pleural effusions, right greater than left  HEART/GREAT VESSELS:  Heart is mildly enlarged  No pericardial effusion  Coronary artery calcifications noted  Normal caliber thoracic aorta with mild atherosclerotic calcifications  MEDIASTINUM AND MIRA:  Unremarkable  CHEST WALL AND LOWER NECK:   Unremarkable  VISUALIZED STRUCTURES IN THE UPPER ABDOMEN:  There are gallstones, with no definite pericholecystic inflammatory changes appreciated  OSSEOUS STRUCTURES:  No acute fracture or destructive osseous lesion  Impression: Slightly limited study due to respiratory motion artifact  No definite evidence of pulmonary embolism  Bilateral multifocal consolidation compatible with multilobar pneumonia  Small bilateral pleural effusions, presumably parapneumonic in nature   Mild cardiomegaly  Cholelithiasis  Workstation performed: [id number]         EKG:  Sinus rhythm with heart rate ranging from 50s to 70s          Counseling / Coordination of Uday Swain MD

## 2019-08-31 NOTE — PLAN OF CARE
Problem: Potential for Falls  Goal: Patient will remain free of falls  Description  INTERVENTIONS:  - Assess patient frequently for physical needs  -  Identify cognitive and physical deficits and behaviors that affect risk of falls    -  [place] fall precautions as indicated by assessment   - Educate patient/family on patient safety including physical limitations  - Instruct patient to call for assistance with activity based on assessment  - Modify environment to reduce risk of injury  - Consider OT/PT consult to assist with strengthening/mobility  Outcome: Progressing     Problem: PAIN - ADULT  Goal: Verbalizes/displays adequate comfort level or baseline comfort level  Description  Interventions:  - Encourage patient to monitor pain and request assistance  - Assess pain using appropriate pain scale  - Administer analgesics based on type and severity of pain and evaluate response  - Implement non-pharmacological measures as appropriate and evaluate response  - Consider cultural and social influences on pain and pain management  - Notify physician/advanced practitioner if interventions unsuccessful or patient reports new pain  Outcome: Progressing     Problem: SAFETY ADULT  Goal: Maintain or return to baseline ADL function  Description  INTERVENTIONS:  -  Assess patient's ability to carry out ADLs; assess patient's baseline for ADL function and identify physical deficits which impact ability to perform ADLs (bathing, care of mouth/teeth, toileting, grooming, dressing, etc )  - Assess/evaluate cause of self-care deficits   - Assess range of motion  - Assess patient's mobility; develop plan if impaired  - Assess patient's need for assistive devices and provide as appropriate  - Encourage maximum independence but intervene and supervise when necessary  - Involve family in performance of ADLs  - Assess for home care needs following discharge   - Consider OT consult to assist with ADL evaluation and planning for discharge  - Provide patient education as appropriate  Outcome: Progressing  Goal: Maintain or return mobility status to optimal level  Description  INTERVENTIONS:  - Assess patient's baseline mobility status (ambulation, transfers, stairs, etc )    - Identify cognitive and physical deficits and behaviors that affect mobility  - Identify mobility aids required to assist with transfers and/or ambulation (gait belt, sit-to-stand, lift, walker, cane, etc )  - [place] fall precautions as indicated by assessment  - Record patient progress and toleration of activity level on Mobility SBAR; progress patient to next Phase/Stage  - Instruct patient to call for assistance with activity based on assessment  - Consider rehabilitation consult to assist with strengthening/weightbearing, etc   Outcome: Progressing     Problem: DISCHARGE PLANNING  Goal: Discharge to home or other facility with appropriate resources  Description  INTERVENTIONS:  - Identify barriers to discharge w/patient and caregiver  - Arrange for needed discharge resources and transportation as appropriate  - Identify discharge learning needs (meds, wound care, etc )  - Arrange for interpretive services to assist at discharge as needed  - Refer to Case Management Department for coordinating discharge planning if the patient needs post-hospital services based on physician/advanced practitioner order or complex needs related to functional status, cognitive ability, or social support system  Outcome: Progressing     Problem: Knowledge Deficit  Goal: Patient/family/caregiver demonstrates understanding of disease process, treatment plan, medications, and discharge instructions  Description  Complete learning assessment and assess knowledge base    Interventions:  - Provide teaching at level of understanding  - Provide teaching via preferred learning methods  Outcome: Progressing     Problem: Nutrition/Hydration-ADULT  Goal: Nutrient/Hydration intake appropriate for improving, restoring or maintaining nutritional needs  Description  Monitor and assess patient's nutrition/hydration status for malnutrition  Collaborate with interdisciplinary team and initiate plan and interventions as ordered  Monitor patient's weight and dietary intake as ordered or per policy  Utilize nutrition screening tool and intervene as necessary  Determine patient's food preferences and provide low sodium foods as appropriate       INTERVENTIONS:  - Monitor oral intake, urinary output, labs, and treatment plans  - Assess nutrition and hydration status and recommend course of action  - Evaluate amount of meals eaten  - Assist patient with eating if necessary   - Allow adequate time for meals  - Recommend/ encourage appropriate diets, oral nutritional supplements, and vitamin/mineral supplements  - Order, calculate, and assess calorie counts as needed  - Recommend, monitor, and adjust tube feedings and TPN/PPN based on assessed needs  - Assess need for intravenous fluids  - Provide specific nutrition/hydration education as appropriate  - Include patient/family/caregiver in decisions related to nutrition   Outcome: Progressing

## 2019-08-31 NOTE — PHYSICAL THERAPY NOTE
PT EVALUATION    Pt  Name: Wellington Lopez  Pt  Age: 78 y o    MRN: [id number]  LENGTH OF STAY: 2    Patient Active Problem List   Diagnosis    [facility]    Sepsis due to pneumonia ([facility] )    Atrial fibrillation with rapid ventricular response ([facility] )    Essential hypertension    Morbid obesity ([facility] )    Major depressive disorder in full remission ([facility] )    Pneumonia       Admitting Diagnoses:   A-fib ([place] [address] ) [I48 [id number]]    Past Medical History:   Diagnosis Date    Anxiety     Hyperlipidemia     Hypertension     Obesity        Past Surgical History:   Procedure Laterality Date    TONSILLECTOMY         Imaging Studies:  No orders to display        08/31/19 1117   Note Type   Note type Eval only   Pain Assessment   Pain Score No Pain   Home Living   Type of [address] One level;Stairs to enter with rails  (2STE)   [address];Cane   Prior Function   Level of [place] Independent with ADLs and functional mobility  (w/o AD)   Lives With Alone   Receives Help From Family   ADL Assistance Independent   Falls in the last 6 months 1 to 4  (1x)   Vocational Full time employment   Comments pt reports only ambulates <100' at a time   Restrictions/Precautions   Weight Bearing Precautions Per Order No   Other Precautions Fall Risk;Telemetry   General   Family/Caregiver Present Yes  (daughter & ALETHEA)   Cognition   Overall Cognitive Status WFL   Arousal/Participation Alert   Attention Within functional limits   Orientation Level Oriented X4   Following Commands Follows one step commands without difficulty   RUE Assessment   RUE Assessment WFL   RUE Strength   RUE Overall Strength Within Functional Limits - able to perform ADL tasks with strength   LUE Assessment   LUE Assessment WFL   LUE Strength   LUE Overall Strength Within Functional Limits - able to perform ADL tasks with strength   RLE Assessment   RLE Assessment WFL  ((+) nonpitting edema)   Strength RLE   RLE Overall Strength 4/5   LLE Assessment   LLE Assessment WFL  ((+) nonpitting edema)   Strength LLE   LLE Overall Strength 4/5   Coordination   Movements are Fluid and Coordinated 1   Sensation WFL   Bed Mobility   Supine to Sit Unable to assess   Additional Comments pt OOB in chair pre & post session   Transfers   Sit to Stand 6  Modified independent   Stand to Sit 6  Modified independent   Ambulation/Elevation   Gait pattern Wide ANA CRISTINA; Decreased foot clearance  (inc lateral displacement 2* to body habitus)   Gait Assistance 7  Independent   Additional items Verbal cues   Assistive Device None   Distance 70'x2  (standing rest in between amb trial 2* to SOB)   Stair Management Assistance Not tested   Balance   Static Sitting Normal   Static Standing Good   Ambulatory Fair +   Endurance Deficit   Endurance Deficit Yes   Endurance Deficit Description SOB   Activity Tolerance   Activity Tolerance Patient limited by fatigue   Nurse Made Aware ANGELITA Perez   Assessment   Prognosis Good   Problem List Decreased strength;Decreased endurance;Obesity; Impaired balance   Assessment Pt 78 y o  female transferred from [facility] w/ SOB & afib w/ RVR Pt admitted for Atrial fibrillation with rapid ventricular response (HCC) w/ pneumonia & sepsis  S/p cardioversion on 8/30  PTA, pt reports being I w/o AD; pt lives alone in a 1 story house w/ 2STE; (+) driving & full time working  Pt referred to PT for mobility assessment & D/C planning  Pt demonstrates no significant decline in function to warrant skilled PT at this time  Pt  I w/ overall functional mobility including amb w/o AD  Dec amb tolerance as baseline  Pt reports only able to ambulate <100' at a time at baseline  Gait slow w/ dec foot clearance & inc lateral displacement B/L'y 2* to body habitus but no gross LOB noted  (+) SOB during amb but relieved w/ rest  SpO2 90-91% at RA during amb   Pt educated on energy conservation techniques w/ good understanding  Pt states being at her functional baseline and states no concerns about going back home when medically cleared  Will D/C PT  Pt may return home when medically cleared  OPPT PRN  Please advise PT when needs change  Pt remain OOB in chair w/o issues, call bell & phone in reach  Pt may ambulate in unit as tolerated to prevent decline in function  Nsg notified  Barriers to Discharge None   Goals   Patient Goals to go home   Treatment Day 0   Plan   Treatment/Interventions Spoke to nursing;Family   PT Frequency Other (Comment)  (D/C PT)   Recommendation   Recommendation Home with family support; Outpatient PT;D/C PT   Equipment Recommended Other (Comment)  (none)   PT - OK to Discharge Yes  (when medically cleared)   Barthel Index   Feeding 10   Bathing 5   Grooming Score 5   Dressing Score 5   Bladder Score 10   Bowels Score 10   Toilet Use Score 10   Transfers (Bed/Chair) Score 15   Mobility (Level Surface) Score 15   Stairs Score 0   Barthel Index Score 85   Hx/personal factors: co-morbidities, home alone, advanced age, telemetry, fall risk, assist w/ ADL's and obesity  Examination: close to baseline dec balance, dec endurance, dec amb, low fall risk  Clinical: unpredictable (ongoing medical status, abnormal lab values and low fall risk)  Complexity: high     Annell Zenobia, PT

## 2019-09-02 ENCOUNTER — HOSPITAL ENCOUNTER (EMERGENCY)
Facility: HOSPITAL | Age: 69
Discharge: HOME | End: 2019-09-02
Attending: EMERGENCY MEDICINE | Admitting: EMERGENCY MEDICINE
Payer: COMMERCIAL

## 2019-09-02 VITALS
SYSTOLIC BLOOD PRESSURE: 149 MMHG | RESPIRATION RATE: 18 BRPM | BODY MASS INDEX: 49.69 KG/M2 | WEIGHT: 270 LBS | TEMPERATURE: 98.9 F | HEIGHT: 62 IN | OXYGEN SATURATION: 94 % | HEART RATE: 71 BPM | DIASTOLIC BLOOD PRESSURE: 67 MMHG

## 2019-09-02 DIAGNOSIS — I10 HYPERTENSION, UNSPECIFIED TYPE: Primary | ICD-10-CM

## 2019-09-02 PROCEDURE — 96374 THER/PROPH/DIAG INJ IV PUSH: CPT

## 2019-09-02 PROCEDURE — 99283 EMERGENCY DEPT VISIT LOW MDM: CPT

## 2019-09-02 PROCEDURE — 96375 TX/PRO/DX INJ NEW DRUG ADDON: CPT

## 2019-09-02 PROCEDURE — 96376 TX/PRO/DX INJ SAME DRUG ADON: CPT

## 2019-09-02 PROCEDURE — 93005 ELECTROCARDIOGRAM TRACING: CPT

## 2019-09-02 RX ORDER — METOPROLOL TARTRATE 5 MG/5ML
5 INJECTION INTRAVENOUS ONCE
Status: COMPLETED | OUTPATIENT
Start: 2019-09-02 | End: 2019-09-02

## 2019-09-02 RX ORDER — HYDRALAZINE HYDROCHLORIDE 20 MG/ML
5 INJECTION INTRAMUSCULAR; INTRAVENOUS ONCE
Status: COMPLETED | OUTPATIENT
Start: 2019-09-02 | End: 2019-09-02

## 2019-09-02 RX ORDER — LORAZEPAM 2 MG/ML
1 INJECTION INTRAMUSCULAR ONCE
Status: DISCONTINUED | OUTPATIENT
Start: 2019-09-02 | End: 2019-09-02 | Stop reason: HOSPADM

## 2019-09-02 RX ADMIN — HYDRALAZINE HYDROCHLORIDE 5 MG: 20 INJECTION INTRAMUSCULAR; INTRAVENOUS at 11:16

## 2019-09-02 RX ADMIN — METOPROLOL TARTRATE 5 MG: 1 INJECTION, SOLUTION INTRAVENOUS at 10:11

## 2019-09-02 RX ADMIN — HYDRALAZINE HYDROCHLORIDE 5 MG: 20 INJECTION INTRAMUSCULAR; INTRAVENOUS at 12:07

## 2019-09-02 NOTE — DISCHARGE INSTRUCTIONS
Continue meds as prescribed and call your doctor for an appointment tomorrow as meds likely need to be modified  We generally do not treat high blood pressure in the emergency department unless the top number is over 220 or the bottom number is over 120  Return to ER if you have chest pain, trouble breathing, numbness or weakness in the arms or legs

## 2019-09-02 NOTE — ED PROVIDER NOTES
History  Chief Complaint   Patient presents with    High Blood Pressure     unsymptomatic hypertension  Patient presents with asymptomatic hypertension this morning  Patient took her 12 5 mg dose of metoprolol and checked her blood pressure approximately 1 hour afterwards and was found to have systolic in the 549I  Patient family became concerned because she was recently admitted to the hospital for pneumonia and atrial fibrillation with rapid ventricular response which needed to be electrically cardioverted  Patient was discharged several days ago after successful cardioversion is started on metoprolol daily, Cardizem was stopped due to bradycardia while admitted  Patient denies any symptoms at this time including chest pain, dyspnea, nausea, vomiting, palpitations  Patient states overall she is feeling much better since leaving the hospital       History provided by:  Patient  Hypertension   Severity:  Moderate  Onset quality:  Gradual  Chronicity:  Chronic  Notable PTA blood pressures:  482'H systolic  Worsened by:  Nothing  Ineffective treatments:  Beta blockers  Associated symptoms: peripheral edema    Associated symptoms: no abdominal pain, no anxiety, no chest pain, no epistaxis, no fever, no headaches, no hematuria, no nausea, no palpitations, no shortness of breath, not vomiting and no weakness        Prior to Admission Medications   Prescriptions Last Dose Informant Patient Reported? Taking?    apixaban (ELIQUIS) 5 mg   No No   Sig: Take 1 tablet (5 mg total) by mouth 2 (two) times a day   atorvastatin (LIPITOR) 40 mg tablet   Yes No   Sig: atorvastatin 40 mg tablet   take one tab by mouth once daily   escitalopram (LEXAPRO) 20 mg tablet   Yes No   Sig: Daily   furosemide (LASIX) 40 mg tablet   No No   Sig: Take 1 tablet (40 mg total) by mouth daily   lisinopril (ZESTRIL) 20 mg tablet   No No   Sig: Take 1 tablet (20 mg total) by mouth daily   metoprolol succinate (TOPROL-XL) 25 mg 24 hr tablet No No   Sig: Take 0 5 tablets (12 5 mg total) by mouth daily Take half of a tablet by mouth daily   potassium chloride (K-DUR,KLOR-CON) 20 mEq tablet   No No   Sig: Take 1 tablet (20 mEq total) by mouth daily      Facility-Administered Medications: None       Past Medical History:   Diagnosis Date    Anxiety     [facility] )     Hyperlipidemia     Hypertension     Obesity        Past Surgical History:   Procedure Laterality Date    CARDIOVERSION      TONSILLECTOMY         History reviewed  No pertinent family history  I have reviewed and agree with the history as documented  Social History     Tobacco Use    Smoking status: Never Smoker    Smokeless tobacco: Never Used   Substance Use Topics    Alcohol use: Yes     Comment: social    Drug use: Never        Review of Systems   Constitutional: Negative for chills and fever  HENT: Negative for congestion, nosebleeds, rhinorrhea and sore throat  Eyes: Negative for visual disturbance  Respiratory: Negative for cough and shortness of breath  Cardiovascular: Negative for chest pain and palpitations  Gastrointestinal: Negative for abdominal pain, diarrhea, nausea and vomiting  Genitourinary: Negative for dysuria and hematuria  Musculoskeletal: Negative for back pain  Skin: Negative for rash  Neurological: Negative for weakness, numbness and headaches  Psychiatric/Behavioral: The patient is not nervous/anxious  Physical Exam  Physical Exam   Constitutional: She is oriented to person, place, and time  She appears well-developed and well-nourished  HENT:   Head: Normocephalic and atraumatic  Right Ear: External ear normal    Left Ear: External ear normal    Nose: Nose normal    Mouth/Throat: Oropharynx is clear and moist    Eyes: Conjunctivae and EOM are normal    Neck: Normal range of motion  Neck supple  Cardiovascular: Regular rhythm, normal heart sounds and intact distal pulses     Tachycardic   Pulmonary/Chest: Effort normal and breath sounds normal    Abdominal: Soft  Bowel sounds are normal  There is no tenderness  Musculoskeletal: She exhibits edema  2+ pitting edema bilateral lower legs, ankles and feet  Neurological: She is alert and oriented to person, place, and time  Skin: Skin is warm and dry  Capillary refill takes less than 2 seconds  Psychiatric: She has a normal mood and affect  Her behavior is normal    Nursing note and vitals reviewed  Vital Signs  ED Triage Vitals [09/02/19 0932]   Temperature Pulse Respirations Blood Pressure SpO2   98 9 °F (37 2 °C) (!) 120 20 (!) 220/103 93 %      Temp Source Heart Rate Source Patient Position - Orthostatic VS BP Location FiO2 (%)   Temporal Monitor Sitting Left arm --      Pain Score       No Pain           Vitals:    09/02/19 1200 09/02/19 1209 09/02/19 1215 09/02/19 1230   BP:  (!) 196/84 163/73 149/67   Pulse: 68 69 68 71   Patient Position - Orthostatic VS:             Visual Acuity      ED Medications  Medications   LORazepam (ATIVAN) 2 mg/mL injection 1 mg (has no administration in time range)   metoprolol (LOPRESSOR) injection 5 mg (5 mg Intravenous Given 9/2/19 1011)   hydrALAZINE (APRESOLINE) injection 5 mg (5 mg Intravenous Given 9/2/19 1116)   hydrALAZINE (APRESOLINE) injection 5 mg (5 mg Intravenous Given 9/2/19 1207)       Diagnostic Studies  Results Reviewed     None                 No orders to display              Procedures  ECG 12 Lead Documentation Only  Date/Time: 9/2/2019 9:51 AM  Performed by: Dylan John PA-C  Authorized by:  Dylan John PA-C     Indications / Diagnosis:  HTN  ECG reviewed by me, the ED Provider: yes    Patient location:  ED  Previous ECG:     Comparison to cardiac monitor: Yes    Interpretation:     Interpretation: abnormal    Rate:     ECG rate:  113    ECG rate assessment: tachycardic    Rhythm:     Rhythm: sinus tachycardia    Ectopy:     Ectopy: PAC    QRS:     QRS axis:  Normal    QRS intervals: Normal  Conduction:     Conduction: normal    ST segments:     ST segments:  Normal  T waves:     T waves: normal             ED Course  ED Course as of Sep 02 1235   Mon Sep 02, 2019   1233 Blood pressure is an acceptable level at this point time, will discharge patient with outpatient follow-up  MDM    Disposition  Final diagnoses:   Hypertension, unspecified type     Time reflects when diagnosis was documented in both MDM as applicable and the Disposition within this note     Time User Action Codes Description Comment    9/2/2019 12:33 PM Dahlia Webb Add [I10] Hypertension, unspecified type       ED Disposition     ED Disposition Condition Date/Time Comment    Discharge Stable Mon Sep 2, 2019 12:33 PM Allison Close discharge to home/self care  Follow-up Information     Follow up With Specialties Details Why Contact Luly Giraldo MD Internal Medicine Schedule an appointment as soon as possible for a visit   [address] [place], [place] [phone]            Patient's Medications   Discharge Prescriptions    No medications on file     No discharge procedures on file      ED Provider  Electronically Signed by           Anson Balderas PA-C  09/02/19 1239

## 2019-09-03 LAB
ATRIAL RATE: 113 BPM
ATRIAL RATE: 49 BPM
P AXIS: 48 DEGREES
P AXIS: 60 DEGREES
PR INTERVAL: 142 MS
PR INTERVAL: 144 MS
QRS AXIS: -11 DEGREES
QRS AXIS: -31 DEGREES
QRSD INTERVAL: 88 MS
QRSD INTERVAL: 92 MS
QT INTERVAL: 342 MS
QT INTERVAL: 492 MS
QTC INTERVAL: 444 MS
QTC INTERVAL: 469 MS
T WAVE AXIS: 103 DEGREES
T WAVE AXIS: 79 DEGREES
VENTRICULAR RATE: 113 BPM
VENTRICULAR RATE: 49 BPM

## 2019-09-03 PROCEDURE — 93010 ELECTROCARDIOGRAM REPORT: CPT | Performed by: INTERNAL MEDICINE

## 2019-09-03 NOTE — UTILIZATION REVIEW
Notification of Discharge  This is a Notification of Discharge from our facility [address]  Please be advised that this patient has been discharge from our facility  Below you will find the admission and discharge date and time including the patients disposition  PRESENTATION DATE: 8/29/2019  4:38 PM  OBS ADMISSION DATE: 8/29/2019 5:25PM  IP ADMISSION DATE: 8/29/19 1638   DISCHARGE DATE: 8/31/2019  1:40 PM  DISPOSITION: Home/Self Care Home/Self Care   Admission Orders listed below:  Admission Orders (From admission, onward)     Ordered        08/30/19 1051  Inpatient Admission  Once         08/29/19 1725  Place in Observation  Once                 Please contact the UR Department if additional information is required to close this patient's authorization/case  [address]  Utilization Review Department  Phone: [phone]; Fax [phone]  [email or web address]  ATTENTION: Please call with any questions or concerns to [phone]  and carefully listen to the prompts so that you are directed to the right person  Send all requests for admission clinical reviews, approved or denied determinations and any other requests to fax [phone]   All voicemails are confidential

## 2019-09-12 ENCOUNTER — TRANSCRIBE ORDERS (OUTPATIENT)
Dept: ADMINISTRATIVE | Facility: HOSPITAL | Age: 69
End: 2019-09-12

## 2019-09-12 ENCOUNTER — OFFICE VISIT (OUTPATIENT)
Dept: CARDIOLOGY CLINIC | Facility: CLINIC | Age: 69
End: 2019-09-12
Payer: COMMERCIAL

## 2019-09-12 VITALS
DIASTOLIC BLOOD PRESSURE: 70 MMHG | SYSTOLIC BLOOD PRESSURE: 160 MMHG | HEIGHT: 62 IN | WEIGHT: 254 LBS | BODY MASS INDEX: 46.74 KG/M2 | HEART RATE: 88 BPM

## 2019-09-12 DIAGNOSIS — I10 ESSENTIAL HYPERTENSION: ICD-10-CM

## 2019-09-12 DIAGNOSIS — I48.0 PAROXYSMAL ATRIAL FIBRILLATION (HCC): Primary | ICD-10-CM

## 2019-09-12 DIAGNOSIS — E66.01 MORBID OBESITY (HCC): ICD-10-CM

## 2019-09-12 DIAGNOSIS — G47.30 SLEEP APNEA, UNSPECIFIED TYPE: Primary | ICD-10-CM

## 2019-09-12 PROCEDURE — 99204 OFFICE O/P NEW MOD 45 MIN: CPT | Performed by: INTERNAL MEDICINE

## 2019-09-12 RX ORDER — AMLODIPINE BESYLATE AND BENAZEPRIL HYDROCHLORIDE 10; 40 MG/1; MG/1
CAPSULE ORAL DAILY
COMMUNITY
Start: 2019-09-04 | End: 2019-12-20 | Stop reason: HOSPADM

## 2019-09-12 NOTE — PROGRESS NOTES
[facility] [address] [facility], [address]   [place], [address] [facility]   [phone]                                              Cardiology Office Consult  Michele Brady, 78 y o  female  YOB: 1950  MRN: [id number] Encounter: [id number]      PCP - Teresita Cooney MD    Assessment and Plan  1  New onset atrial fibrillation  2  Diastolic heart failure  3  Hypertension  4  Hyperlipidemia  5  Morbid obesity, Body mass index is 46 46 kg/m²  Plan  New onset Paroxysmal Atrial Fibrillation  · Recently diagnosed - about 3 weeks ago, when she presented with heart failure  · Risk factors evaluation  · Thyroid disease? No  Last TSH 1 17  · Excess Alcohol? Rarely drinks alcohol  · Untreated YUDI? Has multiple risk factors including obesity, neck circumference, snorting, daytime sleepiness  She is currently awaiting a sleep study insurance approval  · Smoking? No  · Obesity? Body mass index is 46 46 kg/m²  Extensively counseled regarding weight loss and need to exercise  · Rhythm control v/s rate control  · Previous rhythm control attempts? SUSIE-cardioversion - 8/30/19 - successful  · Remains in normal sinus rhythm today  · She had her heart rate in AFib was very difficult to controlled, woke up all and she had needed max doses of Cardizem, metoprolol and digoxin for some control, but subsequently since being converted to sinus rhythm she has remained bradycardic, and has been switched to metoprolol succinate 12 5 mg  · She has remained bradycardic even on this of very low-dose of metoprolol, with heart rate ranging in 45 to 60s at rest   She does not have any dizziness  She does have some fatigue  I discussed that if she were to remain fatigued and her heart rate will this low, then she can discontinue the metoprolol  · Stroke prevention  · On Eliquis 5mg b i d      Chronic diastolic heart failure  · Appears euvolemic on exam today  · Her recent take acute exacerbation was related to rapid atrial fibrillation, when her weight had increased from a baseline of 270 lb to 290 lb  · With diuresis she is now down to 254 lb  · At this point, now that she has remained in sinus rhythm, I think we can discontinue her Lasix  · I have recommended her to continue checking daily weights, and if she were to go up greater than 3 lb in a day, then she should restart taking Lasix 20 mg  · She needs better control of her blood pressure, and may need additional BP medications after coming off the Lasix      History of Present Illness   65-year-old female comes in as a new patient, for hospital follow-up, after recent diagnosis of atrial fibrillation  I saw her in the hospital when she had presented with rapid atrial fibrillation and heart failure  She had needed multiple medications including max doses of Cardizem, metoprolol and digoxin for rate control, and was very volume overloaded and short of breath needing BiPAP initially  She was diuresed with IV Lasix, and subsequently transferred to [facility] for a SUSIE cardioversion  She was successfully cardioverted, and discharged home on oral Lasix and reduced doses of rate control medications  Since discharge, she has been doing well, and does not have any shortness of breath  Her weight has continued to go down, and she is down from a hospital rate of more than 290 lb to 253 lb currently  She notes that she has not been this weight for almost 2 decades, and feels great  She has been trying to make some lifestyle changes and dietary modifications        Historical Information   Past Medical History:   Diagnosis Date    Anxiety     Atrial fibrillation     Hyperlipidemia     Hypertension     Obesity      Past Surgical History:   Procedure Laterality Date    CARDIOVERSION      TONSILLECTOMY       Family History   Problem Relation Age of Onset    Heart attack Mother     Hypertension Mother      Current Outpatient Medications on File Prior to Visit   Medication Sig Dispense Refill    amLODIPine-benazepril (LOTREL) 10-40 MG per capsule Take by mouth daily      apixaban (ELIQUIS) 5 mg Take 1 tablet (5 mg total) by mouth 2 (two) times a day 60 tablet 1    atorvastatin (LIPITOR) 40 mg tablet atorvastatin 40 mg tablet   take one tab by mouth once daily      escitalopram (LEXAPRO) 20 mg tablet Daily      metoprolol succinate (TOPROL-XL) 25 mg 24 hr tablet Take 0 5 tablets (12 5 mg total) by mouth daily Take half of a tablet by mouth daily 15 tablet 1    [DISCONTINUED] furosemide (LASIX) 40 mg tablet Take 1 tablet (40 mg total) by mouth daily 30 tablet 1    [DISCONTINUED] potassium chloride (K-DUR,KLOR-CON) 20 mEq tablet Take 1 tablet (20 mEq total) by mouth daily 30 tablet 1    lisinopril (ZESTRIL) 20 mg tablet Take 1 tablet (20 mg total) by mouth daily (Patient not taking: Reported on 9/12/2019) 30 tablet 1     No current facility-administered medications on file prior to visit  Allergies   Allergen Reactions    Other Itching     lobster    Penicillins Rash     Social History     Socioeconomic History    Marital status:       Spouse name: None    Number of children: None    Years of education: None    Highest education level: None   Occupational History    None   Social Needs    Financial resource strain: None    Food insecurity:     Worry: None     Inability: None    Transportation needs:     Medical: None     Non-medical: None   Tobacco Use    Smoking status: Never Smoker    Smokeless tobacco: Never Used   Substance and Sexual Activity    Alcohol use: Yes     Comment: social    Drug use: Never    Sexual activity: None   Lifestyle    Physical activity:     Days per week: None     Minutes per session: None    Stress: None   Relationships    Social connections:     Talks on phone: None     Gets together: None     Attends [employer] service: None     Active member of club or organization: None     Attends meetings of clubs or organizations: None     Relationship status: None    Intimate partner violence:     Fear of current or ex partner: None     Emotionally abused: None     Physically abused: None     Forced sexual activity: None   Other Topics Concern    None   Social History Narrative    None        Review of Systems  No c/o chest pain, No c/o palpitations, No c/o shortness of breath, No c/o dizziness or light-headedness  All other systems negative, except as noted in HPI    Vitals:  Vitals:    09/12/19 1405   BP: 160/70   Pulse: 88   Weight: 115 kg (254 lb)   Height: 5' 2" (1 575 m)     BMI - Body mass index is 46 46 kg/m²  Wt Readings from Last 7 Encounters:   09/12/19 115 kg (254 lb)   09/02/19 122 kg (270 lb)   08/31/19 126 kg (277 lb 14 4 oz)   08/29/19 129 kg (285 lb)       Physical Exam  Vitals reviewed  Nursing note & chart reviewed  Constitutional:  Edwin Holloway appears well, pleasant and cooperative  Alert and oriented x 3  No acute distress   HEENT:    Normocephalic, atraumatic   Neck:  Supple, no JVD, negative AJR   Lungs:  Respirations unlabored, clear to auscultation bilaterally, no rales, no wheezing  Chest Wall:  No tenderness, no pertinent deformity   Heart[de-identified]  Regular rate, Regular rhythm, S1 and S2 normal, no murmurs, no rubs, no gallops   Abdomen:  Soft, non-tender, non-distended   Neurological:  Awake, alert, oriented x3   Non-focal exam    Extremities:  No pedal edema, no calf tenderness       Labs:  CBC:   Lab Results   Component Value Date    WBC 9 63 08/30/2019    RBC 4 61 08/30/2019    HGB 13 6 08/30/2019    HCT 43 0 08/30/2019    MCV 93 08/30/2019     (H) 08/30/2019    RDW 13 0 08/30/2019       CMP:   Lab Results   Component Value Date    K 4 2 08/31/2019     08/31/2019    CO2 31 08/31/2019    BUN 26 (H) 08/31/2019    CREATININE 0 94 08/31/2019    EGFR 62 08/31/2019    CALCIUM 8 8 08/31/2019    AST 18 08/27/2019    ALT 22 08/27/2019    ALKPHOS 116 08/27/2019 Magnesium:  Lab Results   Component Value Date    MG 2 5 08/31/2019       Lipid Profile:   Lab Results   Component Value Date    HDL 62 (H) 05/07/2019    TRIG 153 (H) 05/07/2019    LDLCALC 87 05/07/2019       Thyroid Studies:   Lab Results   Component Value Date    OWC1GZKRFGYF 1 170 08/27/2019    FREET4 1 22 05/07/2019       No components found for: Hipui [facility]    Lab Results   Component Value Date    INR 1 26 08/26/2019   5    Imaging: Xr Chest Portable    Result Date: 8/29/2019  Narrative: CHEST INDICATION:   hypoxia  COMPARISON:  Chest radiographs August 26, 2019 and CT pulmonary angiogram August 26, 2019  EXAM PERFORMED/VIEWS:  XR CHEST PORTABLE  AP semierect Images: 2 FINDINGS: The heart is enlarged  Atherosclerotic changes in the aorta  Lung volumes diminished  Bilateral pleural effusions,, slightly increased  Janie and pulmonary vessels are prominent  Bilateral perihilar and lower lobe infiltrates improved  Osseous structures appear within normal limits for patient age  Impression: Mild improvement in the overall appearance of the chest, suggesting resolving vascular congestion and improving multifocal pneumonia  Workstation performed: [id number]     Xr Chest 1 View Portable    Result Date: 8/26/2019  Narrative: CHEST INDICATION:   shortness of breath, afib rvr  COMPARISON:  None EXAM PERFORMED/VIEWS:  XR CHEST PORTABLE FINDINGS: There is cardiomegaly  There seem to be right greater than left bibasilar opacities which could be bilateral pneumonia or bibasilar edema  Correlate for any fever  There might be trace amounts of pleural fluid  No pneumothorax or mediastinal shift  Osseous structures appear within normal limits for patient age  Impression: Bibasilar opacities could be pneumonia or bibasilar edema  Suggestion of trace amount of pleural fluid as well  Cardiomegaly noted   Workstation performed: [id number]     P[facility]co [facility] Ed Chest Pe Study    Result Date: 8/26/2019  Narrative: CTA - CHEST WITH IV CONTRAST - PULMONARY ANGIOGRAM INDICATION:   Shortness of breath elevated ddimer, sob, hypoxia  COMPARISON: None  TECHNIQUE: CTA examination of the chest was performed using angiographic technique according to a protocol specifically tailored to evaluate for pulmonary embolism  Axial, sagittal, and coronal 2D reformatted images were created from the source data and  submitted for interpretation  In addition, coronal 3D MIP postprocessing was performed on the acquisition scanner  Radiation dose length product (DLP) for this visit:  668 mGy-cm   This examination, like all CT scans performed in the [facility], was performed utilizing techniques to minimize radiation dose exposure, including the use of iterative reconstruction and automated exposure control  IV Contrast:  85 mL of iohexol (OMNIPAQUE)  FINDINGS: PULMONARY ARTERIAL TREE: Evaluation is somewhat limited due to respiratory motion artifact, which degrades images  No definite pulmonary embolus is seen  LUNGS:  Slightly limited study due to respiratory motion artifact  There is bilateral multi lobar consolidation, most compatible with pneumonia  There is no tracheal or endobronchial lesion  PLEURA:  Small bilateral pleural effusions, right greater than left  HEART/GREAT VESSELS:  Heart is mildly enlarged  No pericardial effusion  Coronary artery calcifications noted  Normal caliber thoracic aorta with mild atherosclerotic calcifications  MEDIASTINUM AND MIRA:  Unremarkable  CHEST WALL AND LOWER NECK:   Unremarkable  VISUALIZED STRUCTURES IN THE UPPER ABDOMEN:  There are gallstones, with no definite pericholecystic inflammatory changes appreciated  OSSEOUS STRUCTURES:  No acute fracture or destructive osseous lesion  Impression: Slightly limited study due to respiratory motion artifact  No definite evidence of pulmonary embolism  Bilateral multifocal consolidation compatible with multilobar pneumonia    Small bilateral pleural effusions, presumably parapneumonic in nature  Mild cardiomegaly  Cholelithiasis  Workstation performed: [id number]       Cardiac testing:   Results for orders placed during the hospital encounter of 19   Echo complete with contrast if indicated    Narrative [address] [place] [address]    Transthoracic Echocardiogram  2D, M-mode, Doppler, and Color Doppler    Study date:  26-Aug-2019    Patient: Shree Postal  MR number: [id number]  Account number: [de-identified]  : 1950  Age: 78 years  Gender: Female  Status: Inpatient  Location: [facility]   Height: 61 in  Weight: 290 4 lb  BP: 122/ 67 mmHg    Indications: Atrial fibrillation  Diagnoses: I48 0 - Atrial fibrillation    Sonographer:  Taras Eddy RDCS  Referring Physician:  Bhavya Ward MD  Group:  [id number]  Interpreting Physician:  Clarita Coleman MD    SUMMARY    LEFT VENTRICLE:  Systolic function was normal  Ejection fraction was estimated to be 60 %  There were no regional wall motion abnormalities  RIGHT VENTRICLE:  Wall thickness was mildly increased  MITRAL VALVE:  There was mild regurgitation  HISTORY: Dyspnea  PROCEDURE: The study was performed in the [facility]  This was a stat study  The transthoracic approach was used  The study included complete 2D imaging, M-mode, complete spectral Doppler, and color Doppler  The heart  rate was 142 bpm, at the start of the study  Images were obtained from the parasternal, apical, subcostal, and suprasternal notch acoustic windows  Intravenous contrast ( 0 8 ml of Definity in NSS) was administered to opacify the left  ventricle  Echocardiographic views were limited due to restricted patient mobility, poor acoustic window availability, decreased penetration, and lung interference  This was a technically difficult study      LEFT VENTRICLE: Size was normal  Systolic function was normal  Ejection fraction was estimated to be 60 %  There were no regional wall motion abnormalities  Wall thickness was normal  No evidence of apical thrombus  DOPPLER: The study was  not technically sufficient to allow evaluation of LV diastolic function  RIGHT VENTRICLE: The size was normal  Systolic function was normal  Wall thickness was mildly increased  LEFT ATRIUM: Size was normal     RIGHT ATRIUM: Size was normal     MITRAL VALVE: Valve structure was normal  There was normal leaflet separation  DOPPLER: The transmitral velocity was within the normal range  There was no evidence for stenosis  There was mild regurgitation  AORTIC VALVE: The valve was trileaflet  Leaflets exhibited normal thickness and normal cuspal separation  DOPPLER: Transaortic velocity was within the normal range  There was no evidence for stenosis  There was no significant  regurgitation  TRICUSPID VALVE: The valve structure was normal  There was normal leaflet separation  DOPPLER: The transtricuspid velocity was within the normal range  There was no evidence for stenosis  There was no significant regurgitation  PULMONIC VALVE: Not well visualized  DOPPLER: The transpulmonic velocity was within the normal range  There was no significant regurgitation  PERICARDIUM: There was no pericardial effusion  The pericardium was normal in appearance  AORTA: The root exhibited normal size  SYSTEMIC VEINS: IVC: The inferior vena cava was normal in size      SYSTEM MEASUREMENT TABLES    2D  %FS: 30 72 %  AV Diam: 2 98 cm  Ao asc: 3 04 cm  EDV(Teich): 106 66 ml  EF(Teich): 58 19 %  ESV(Teich): 44 6 ml  IVSd: 1 29 cm  LA Area: 22 52 cm2  LA Diam: 4 44 cm  LVIDd: 4 78 cm  LVIDs: 3 31 cm  LVPWd: 1 3 cm  RA Area: 24 74 cm2  SI(Teich): 27 96 ml/m2  SV(Cube): 73 06 ml  SV(Teich): 62 07 ml    CW  TR Vmax: 3 12 m/s  TR maxP 88 mmHg    MM  TAPSE: 1 21 cm    [facility]    Prepared and electronically signed by    Regis Hood MD  Signed 26-Aug-2019 15:58:59       Results for orders placed during the hospital encounter of 08/29/19   SUSIE    Narrative Roya Flor DO     8/30/2019 12:52 PM  Procedure note:  SUSIE Preliminary Report    Impression:     LV:  Normal size and systolic function  Left ventricular   ejection fraction ~ 60%  LA:  Mildly dilated  GRAY:  Normal size and function  No spontaneous echocontrast   ("smoke"), or thrombus  RA:  Normal size  Interatrial septum:  Probable very small tunneled PFO with right   to left shunting noted by color flow doppler study  RV:  Normal size and systolic function  MV:  Normal structure  No mitral stenosis  Trace regurgitation  AV:  Normal structure  No aortic stenosis  Trace regurgitation  TV:  Normal structure  No tricuspid stenosis  Mild   regurgitation  PV:  Grossly normal but poorly visualized  No gross pulmonic   stenosis or significant regurgitation  AO:  Normal appearing root, descending thoracic aorta, and aortic   arch with minimal atherocalcific change in the upper descending   thoracic aorta    Informed consent obtained from patient prior to procedure after   all indications, risks, and benefits of SUSIE thoroughly discussed  Propofol was utilized for sedation and administered   intravenously by Anesthesia  Blood pressure, EKG, pulse   oximetry, respirations, and level of consciousness were monitored   throughout the procedure  Pt tolerated procedure well with nurse   anesthetist performing sedation and monitoring  SUSIE probe passed   without difficulty with no blood seen on probe upon extubation  No results found for this or any previous visit  No results found for this or any previous visit

## 2019-09-18 ENCOUNTER — TELEPHONE (OUTPATIENT)
Dept: CARDIOLOGY CLINIC | Facility: CLINIC | Age: 69
End: 2019-09-18

## 2019-09-18 NOTE — TELEPHONE ENCOUNTER
Pt taking half dose of 25 mg metoprolol a day  Wants to know if she could cut her half a dose into a quarter of a dose  She is feeling worn down, tired and her heart rate is between 45-50

## 2019-10-24 ENCOUNTER — APPOINTMENT (OUTPATIENT)
Dept: RADIOLOGY | Facility: CLINIC | Age: 69
End: 2019-10-24
Payer: COMMERCIAL

## 2019-10-24 DIAGNOSIS — R93.89 ABNORMAL CT OF THE CHEST: ICD-10-CM

## 2019-10-24 DIAGNOSIS — J96.01 ACUTE RESPIRATORY FAILURE WITH HYPOXIA (HCC): ICD-10-CM

## 2019-10-24 PROCEDURE — 71046 X-RAY EXAM CHEST 2 VIEWS: CPT

## 2019-11-06 ENCOUNTER — TELEPHONE (OUTPATIENT)
Dept: SLEEP CENTER | Facility: CLINIC | Age: 69
End: 2019-11-06

## 2019-11-06 NOTE — TELEPHONE ENCOUNTER
----- Message from Carol Ann Moncada MD sent at 11/5/2019  5:05 PM EST -----  Approved  ----- Message -----  From: Liborio England MA  Sent: 11/4/2019   2:00 PM EST  To: Sleep Medicine [place] Provider    This sleep study needs approval  Pt also with Fatigue, Obesity, Anxiety, Bradycardia, Hypertension, New Onset Atrial Fibrillation, Chronic Diastolic heart Failure, BMI 46 46 as per Referring Dr     If approved please sign and return to clerical pool  If denied please include reasons why  Also provide alternative testing if warranted  Please sign and return to clerical pool

## 2019-12-17 ENCOUNTER — HOSPITAL ENCOUNTER (INPATIENT)
Facility: HOSPITAL | Age: 69
LOS: 3 days | Discharge: HOME | DRG: 291 | End: 2019-12-20
Attending: EMERGENCY MEDICINE | Admitting: INTERNAL MEDICINE
Payer: COMMERCIAL

## 2019-12-17 ENCOUNTER — APPOINTMENT (EMERGENCY)
Dept: RADIOLOGY | Facility: HOSPITAL | Age: 69
DRG: 291 | End: 2019-12-17
Payer: COMMERCIAL

## 2019-12-17 DIAGNOSIS — E66.01 MORBID OBESITY (HCC): ICD-10-CM

## 2019-12-17 DIAGNOSIS — I48.91 ATRIAL FIBRILLATION WITH RAPID VENTRICULAR RESPONSE (HCC): Primary | ICD-10-CM

## 2019-12-17 DIAGNOSIS — I50.9 ACUTE EXACERBATION OF CHF (CONGESTIVE HEART FAILURE) (HCC): ICD-10-CM

## 2019-12-17 PROBLEM — E78.2 MIXED DYSLIPIDEMIA: Status: ACTIVE | Noted: 2019-12-17

## 2019-12-17 PROBLEM — I50.31 ACUTE DIASTOLIC CONGESTIVE HEART FAILURE (HCC): Status: ACTIVE | Noted: 2019-12-17

## 2019-12-17 PROBLEM — D72.823 LEUKEMOID REACTION: Status: ACTIVE | Noted: 2019-12-17

## 2019-12-17 LAB
ALBUMIN SERPL BCP-MCNC: 4.4 G/DL (ref 3.5–5.7)
ALP SERPL-CCNC: 103 U/L (ref 55–165)
ALT SERPL W P-5'-P-CCNC: 22 U/L (ref 7–52)
ANION GAP SERPL CALCULATED.3IONS-SCNC: 12 MMOL/L (ref 4–13)
AST SERPL W P-5'-P-CCNC: 18 U/L (ref 13–39)
ATRIAL RATE: 166 BPM
BASOPHILS # BLD AUTO: 0.1 THOUSANDS/ΜL (ref 0–0.1)
BASOPHILS NFR BLD AUTO: 1 % (ref 0–2)
BILIRUB SERPL-MCNC: 0.9 MG/DL (ref 0.2–1)
BILIRUB UR QL STRIP: NEGATIVE
BNP SERPL-MCNC: 354 PG/ML (ref 1–100)
BUN SERPL-MCNC: 27 MG/DL (ref 7–25)
CALCIUM SERPL-MCNC: 9.3 MG/DL (ref 8.6–10.5)
CHLORIDE SERPL-SCNC: 108 MMOL/L (ref 98–107)
CLARITY UR: CLEAR
CO2 SERPL-SCNC: 23 MMOL/L (ref 21–31)
COLOR UR: YELLOW
CREAT SERPL-MCNC: 0.8 MG/DL (ref 0.6–1.2)
EOSINOPHIL # BLD AUTO: 0 THOUSAND/ΜL (ref 0–0.61)
EOSINOPHIL NFR BLD AUTO: 0 % (ref 0–5)
ERYTHROCYTE [DISTWIDTH] IN BLOOD BY AUTOMATED COUNT: 15.6 % (ref 11.5–14.5)
GFR SERPL CREATININE-BSD FRML MDRD: 75 ML/MIN/1.73SQ M
GLUCOSE SERPL-MCNC: 148 MG/DL (ref 65–99)
GLUCOSE UR STRIP-MCNC: NEGATIVE MG/DL
HCT VFR BLD AUTO: 39.7 % (ref 42–47)
HGB BLD-MCNC: 12.4 G/DL (ref 12–16)
HGB UR QL STRIP.AUTO: NEGATIVE
INR PPP: 1.52 (ref 0.9–1.5)
KETONES UR STRIP-MCNC: NEGATIVE MG/DL
LEUKOCYTE ESTERASE UR QL STRIP: NEGATIVE
LYMPHOCYTES # BLD AUTO: 1.2 THOUSANDS/ΜL (ref 0.6–4.47)
LYMPHOCYTES NFR BLD AUTO: 10 % (ref 21–51)
MCH RBC QN AUTO: 28.2 PG (ref 26–34)
MCHC RBC AUTO-ENTMCNC: 31.2 G/DL (ref 31–37)
MCV RBC AUTO: 90 FL (ref 81–99)
MONOCYTES # BLD AUTO: 0.7 THOUSAND/ΜL (ref 0.17–1.22)
MONOCYTES NFR BLD AUTO: 7 % (ref 2–12)
NEUTROPHILS # BLD AUTO: 9.5 THOUSANDS/ΜL (ref 1.4–6.5)
NEUTS SEG NFR BLD AUTO: 82 % (ref 42–75)
NITRITE UR QL STRIP: NEGATIVE
PH UR STRIP.AUTO: 5 [PH]
PLATELET # BLD AUTO: 320 THOUSANDS/UL (ref 149–390)
PMV BLD AUTO: 7.9 FL (ref 8.6–11.7)
POTASSIUM SERPL-SCNC: 3.9 MMOL/L (ref 3.5–5.5)
PROCALCITONIN SERPL-MCNC: <0.05 NG/ML
PROT SERPL-MCNC: 6.9 G/DL (ref 6.4–8.9)
PROT UR STRIP-MCNC: NEGATIVE MG/DL
PROTHROMBIN TIME: 17.7 SECONDS (ref 10.2–13)
QRS AXIS: 73 DEGREES
QRSD INTERVAL: 90 MS
QT INTERVAL: 276 MS
QTC INTERVAL: 447 MS
RBC # BLD AUTO: 4.4 MILLION/UL (ref 3.9–5.2)
SODIUM SERPL-SCNC: 143 MMOL/L (ref 134–143)
SP GR UR STRIP.AUTO: 1.01 (ref 1–1.03)
T WAVE AXIS: 240 DEGREES
TROPONIN I SERPL-MCNC: <0.03 NG/ML
TSH SERPL DL<=0.05 MIU/L-ACNC: 2.31 UIU/ML (ref 0.45–5.33)
UROBILINOGEN UR QL STRIP.AUTO: 0.2 E.U./DL
VENTRICULAR RATE: 158 BPM
WBC # BLD AUTO: 11.6 THOUSAND/UL (ref 4.8–10.8)

## 2019-12-17 PROCEDURE — 84484 ASSAY OF TROPONIN QUANT: CPT | Performed by: EMERGENCY MEDICINE

## 2019-12-17 PROCEDURE — 93005 ELECTROCARDIOGRAM TRACING: CPT

## 2019-12-17 PROCEDURE — 84443 ASSAY THYROID STIM HORMONE: CPT | Performed by: HOSPITALIST

## 2019-12-17 PROCEDURE — 99285 EMERGENCY DEPT VISIT HI MDM: CPT

## 2019-12-17 PROCEDURE — 96365 THER/PROPH/DIAG IV INF INIT: CPT

## 2019-12-17 PROCEDURE — 80053 COMPREHEN METABOLIC PANEL: CPT | Performed by: EMERGENCY MEDICINE

## 2019-12-17 PROCEDURE — 94760 N-INVAS EAR/PLS OXIMETRY 1: CPT

## 2019-12-17 PROCEDURE — 71045 X-RAY EXAM CHEST 1 VIEW: CPT

## 2019-12-17 PROCEDURE — 85610 PROTHROMBIN TIME: CPT | Performed by: EMERGENCY MEDICINE

## 2019-12-17 PROCEDURE — 94664 DEMO&/EVAL PT USE INHALER: CPT

## 2019-12-17 PROCEDURE — 81003 URINALYSIS AUTO W/O SCOPE: CPT | Performed by: EMERGENCY MEDICINE

## 2019-12-17 PROCEDURE — 85025 COMPLETE CBC W/AUTO DIFF WBC: CPT | Performed by: EMERGENCY MEDICINE

## 2019-12-17 PROCEDURE — 84145 PROCALCITONIN (PCT): CPT | Performed by: HOSPITALIST

## 2019-12-17 PROCEDURE — 36415 COLL VENOUS BLD VENIPUNCTURE: CPT | Performed by: EMERGENCY MEDICINE

## 2019-12-17 PROCEDURE — 99291 CRITICAL CARE FIRST HOUR: CPT | Performed by: EMERGENCY MEDICINE

## 2019-12-17 PROCEDURE — 96375 TX/PRO/DX INJ NEW DRUG ADDON: CPT

## 2019-12-17 PROCEDURE — 83880 ASSAY OF NATRIURETIC PEPTIDE: CPT | Performed by: EMERGENCY MEDICINE

## 2019-12-17 PROCEDURE — 99223 1ST HOSP IP/OBS HIGH 75: CPT | Performed by: HOSPITALIST

## 2019-12-17 PROCEDURE — 96376 TX/PRO/DX INJ SAME DRUG ADON: CPT

## 2019-12-17 PROCEDURE — 93010 ELECTROCARDIOGRAM REPORT: CPT | Performed by: INTERNAL MEDICINE

## 2019-12-17 RX ORDER — DILTIAZEM HYDROCHLORIDE 5 MG/ML
0.35 INJECTION INTRAVENOUS ONCE
Status: COMPLETED | OUTPATIENT
Start: 2019-12-17 | End: 2019-12-17

## 2019-12-17 RX ORDER — ONDANSETRON 2 MG/ML
4 INJECTION INTRAMUSCULAR; INTRAVENOUS EVERY 6 HOURS PRN
Status: DISCONTINUED | OUTPATIENT
Start: 2019-12-17 | End: 2019-12-20 | Stop reason: HOSPADM

## 2019-12-17 RX ORDER — DILTIAZEM HYDROCHLORIDE 5 MG/ML
0.25 INJECTION INTRAVENOUS ONCE
Status: COMPLETED | OUTPATIENT
Start: 2019-12-17 | End: 2019-12-17

## 2019-12-17 RX ORDER — ESCITALOPRAM OXALATE 10 MG/1
10 TABLET ORAL DAILY
Status: DISCONTINUED | OUTPATIENT
Start: 2019-12-18 | End: 2019-12-20 | Stop reason: HOSPADM

## 2019-12-17 RX ORDER — AMLODIPINE BESYLATE 5 MG/1
10 TABLET ORAL DAILY
Status: DISCONTINUED | OUTPATIENT
Start: 2019-12-18 | End: 2019-12-18

## 2019-12-17 RX ORDER — FUROSEMIDE 10 MG/ML
40 INJECTION INTRAMUSCULAR; INTRAVENOUS
Status: DISCONTINUED | OUTPATIENT
Start: 2019-12-18 | End: 2019-12-18

## 2019-12-17 RX ORDER — ACETAMINOPHEN 325 MG/1
650 TABLET ORAL EVERY 6 HOURS PRN
Status: DISCONTINUED | OUTPATIENT
Start: 2019-12-17 | End: 2019-12-20 | Stop reason: HOSPADM

## 2019-12-17 RX ORDER — FUROSEMIDE 10 MG/ML
80 INJECTION INTRAMUSCULAR; INTRAVENOUS ONCE
Status: COMPLETED | OUTPATIENT
Start: 2019-12-17 | End: 2019-12-17

## 2019-12-17 RX ORDER — ATORVASTATIN CALCIUM 40 MG/1
40 TABLET, FILM COATED ORAL
Status: DISCONTINUED | OUTPATIENT
Start: 2019-12-17 | End: 2019-12-20 | Stop reason: HOSPADM

## 2019-12-17 RX ORDER — DOCUSATE SODIUM 100 MG/1
100 CAPSULE, LIQUID FILLED ORAL 2 TIMES DAILY
Status: DISCONTINUED | OUTPATIENT
Start: 2019-12-17 | End: 2019-12-20 | Stop reason: HOSPADM

## 2019-12-17 RX ORDER — LISINOPRIL 20 MG/1
40 TABLET ORAL DAILY
Status: DISCONTINUED | OUTPATIENT
Start: 2019-12-18 | End: 2019-12-19

## 2019-12-17 RX ADMIN — FUROSEMIDE 80 MG: 10 INJECTION, SOLUTION INTRAMUSCULAR; INTRAVENOUS at 13:32

## 2019-12-17 RX ADMIN — APIXABAN 5 MG: 5 TABLET, FILM COATED ORAL at 17:46

## 2019-12-17 RX ADMIN — DOCUSATE SODIUM 100 MG: 100 CAPSULE, LIQUID FILLED ORAL at 17:45

## 2019-12-17 RX ADMIN — Medication 15 MG/HR: at 23:38

## 2019-12-17 RX ADMIN — DILTIAZEM HYDROCHLORIDE 42.5 MG: 5 INJECTION INTRAVENOUS at 14:35

## 2019-12-17 RX ADMIN — ATORVASTATIN CALCIUM 40 MG: 40 TABLET, FILM COATED ORAL at 17:45

## 2019-12-17 RX ADMIN — DILTIAZEM HYDROCHLORIDE 30 MG: 5 INJECTION INTRAVENOUS at 13:27

## 2019-12-17 RX ADMIN — DILTIAZEM HYDROCHLORIDE 5 MG/HR: 5 INJECTION INTRAVENOUS at 13:36

## 2019-12-17 NOTE — NURSING NOTE
Patient received from ED on Cardizem gtt at 7 5mh / hr; Atrial Fibrillation with RVR on cardiac monitor  bpm   Patient A+O x4; afebrile; denies chest pain or nausea  Ambulatory from litter to bed in ICU; Voiding clear yellow urine; purwick, external female catheter applied  Lungs clear bilaterally; O2 via nasal cannula at 2 liters / min; patient denies SOB at present time  Safety measures intact and functional; call bell with in reach

## 2019-12-17 NOTE — ED NOTES
Cardiac monitor alarm adjusted to upper rate limit of 165 BPM to avoid alarm fatigue       Praful Soni, RN  12/17/19 [id number]

## 2019-12-17 NOTE — H&P
H&P- Saravanan Singh 1950, 78 y o  female MRN: [id number]    Unit/Bed#: ICU 06 Encounter: [id number]    Primary Care Provider: Ar Mehta MD   Date and time admitted to hospital: 12/17/2019 12:27 PM        * Paroxysmal atrial fibrillation [facility])  Assessment & Plan  · Admit to the ICU  · Continue Cardizem drip  · Continue apixaban for anticoagulation purposes  · Of note, patient is status post a SUSIE and a cardioversion in August of 2019  · Will consult Cardiology  · Unspecified exact trigger  · Will transition off of the Cardizem drip as soon as possible to oral antiarrhythmics    Acute diastolic congestive heart failure (HCC)  Assessment & Plan  Wt Readings from Last 3 Encounters:   12/17/19 122 kg (270 lb)   09/12/19 115 kg (254 lb)   09/02/19 122 kg (270 lb)     · BNP is elevated at 354  · Chest x-ray is concerning for pulmonary vascular congestion and bilateral pleural effusions  · Patient is status post 80 mg of IV Lasix today in the ER  · Start 40 mg of IV Lasix b i d   · Monitor daily weight, input and output  · Will hold off on reordering a 2D echocardiogram since 1 was done a few months ago  · Defer to Cardiology for further management        Essential hypertension  Assessment & Plan  · For the most part blood pressure is very well controlled  · Continue amlodipine and lisinopril    Mixed dyslipidemia  Assessment & Plan  · Continue Lipitor    Morbid obesity ([facility] [address] )  Assessment & Plan  · BMI is 49 38  · Dietary, lifestyle, and weight loss modification counseling was provided    Major depressive disorder in full remission ([facility] [address] )  Assessment & Plan  · Continue Lexapro    Leukemoid reaction  Assessment & Plan  · Unspecified exact etiology  · Check a UA  · Check a procalcitonin level  · Hold off on antibiotic initiation just yet      VTE Prophylaxis: Apixaban (Eliquis)  Code Status:  Full code status level 1  POLST: POLST is not applicable to this patient  Discussion with family:  No family members were present at the time of my H&P evaluation    Anticipated Length of Stay:  Patient will be admitted on an Inpatient basis with an anticipated length of stay of  > 2 midnights  Justification for Hospital Stay:  Need for IV Cardizem drip    Chief Complaint:   Shortness of breath x4 days    History of Present Illness:    Sahara Parry is a 78 y o  female who presents with shortness of breath x4 days which was worse over the past 24 hours  Patient states that she was in her usual state of health up until about 4 days ago  She started feeling short of breath with moderate exertion  Over the past 4 days the shortness of breath has increased to shortness of breath almost at rest   Patient denies any chest pain, nausea, vomiting, diarrhea, fevers, chills and or palpitations  She does however feel bloated  She has noted that her legs have been swelling a little bit more  She was having difficulty climbing 18 steps that she needs to climb as part of her activities of daily living  Over the past 24 hours the shortness of breath became worse  She had a horrible night last night  Earlier today, she felt enough was enough and decided to come into the emergency room  In the emergency room she was diagnosed with atrial fibrillation with rapid ventricular response  She has been since admitted to the ICU for further management        Review of Systems:  Review of Systems   Constitutional: Positive for fatigue  Respiratory: Positive for shortness of breath  Cardiovascular: Positive for leg swelling  Negative for palpitations  Musculoskeletal: Positive for arthralgias and myalgias  All other systems reviewed and are negative        Past Medical and Surgical History:   Past Medical History:   Diagnosis Date    Anxiety     Atrial fibrillation     Hyperlipidemia     Hypertension     Obesity        Past Surgical History:   Procedure Laterality Date    CARDIOVERSION      TONSILLECTOMY         Meds/Allergies:  Prior to Admission medications    Medication Sig Start Date End Date Taking? Authorizing Provider   amLODIPine-benazepril (LOTREL) 10-40 MG per capsule Take by mouth daily 9/4/19   Historical Provider, MD   apixaban (ELIQUIS) 5 mg Take 1 tablet (5 mg total) by mouth 2 (two) times a day 8/31/19   Alvin Bonner DO   atorvastatin (LIPITOR) 40 mg tablet atorvastatin 40 mg tablet   take one tab by mouth once daily    Historical Provider, MD   escitalopram (LEXAPRO) 20 mg tablet Daily    Historical Provider, MD     I have reviewed home medications with patient personally  Allergies: Allergies   Allergen Reactions    Other Itching     lobster    Penicillins Rash       Social History:  Marital Status:    Occupation:  Retired  Patient Pre-hospital Living Situation:  Lives at home  Patient Pre-hospital Level of Mobility:  Independent  Patient Pre-hospital Diet Restrictions:  Low sodium diet  Substance Use History:  Denies    Social History     Substance and Sexual Activity   Alcohol Use Yes    Comment: social     Social History     Tobacco Use   Smoking Status Never Smoker   Smokeless Tobacco Never Used     Social History     Substance and Sexual Activity   Drug Use Never       Family History:  I have reviewed the patients family history - denies any early onset cancer, diabetes and or stroke in immediate family members    Physical Exam:   Vitals:   Blood Pressure: 121/75 (12/17/19 1600)  Pulse: (!) 124 (12/17/19 1600)  Temperature: 98 3 °F (36 8 °C) (12/17/19 1231)  Temp Source: Temporal (12/17/19 1231)  Respirations: 19 (12/17/19 1600)  Height: 5' 2" (157 5 cm) (12/17/19 1231)  Weight - Scale: 122 kg (270 lb) (12/17/19 1231)  SpO2: 94 % (12/17/19 1345)    Physical Exam   Constitutional: She is oriented to person, place, and time  She appears well-developed and well-nourished  HENT:   Head: Normocephalic and atraumatic     Nose: Nose normal    Mouth/Throat: Oropharynx is clear and moist    Eyes: Pupils are equal, round, and reactive to light  Conjunctivae and EOM are normal    Neck: Normal range of motion  Neck supple  No JVD present  No thyromegaly present  Cardiovascular: Intact distal pulses  Exam reveals no gallop and no friction rub  No murmur heard  S1 plus S2, irregularly irregular, tachycardic   Pulmonary/Chest: Effort normal and breath sounds normal  No respiratory distress  Decreased breath sounds bilaterally at the base  Faint crackles at the bases bilaterally   Abdominal: Soft  Bowel sounds are normal  She exhibits no distension and no mass  There is no tenderness  There is no guarding  Musculoskeletal: Normal range of motion  She exhibits edema  Two to 3+ bilateral lower extremity pitting edema   Lymphadenopathy:     She has no cervical adenopathy  Neurological: She is alert and oriented to person, place, and time  No cranial nerve deficit  Skin: Skin is warm  No rash noted  No erythema  Psychiatric: She has a normal mood and affect  Her behavior is normal    Vitals reviewed  Additional Data:   Lab Results: I have personally reviewed pertinent reports  Results from last 7 days   Lab Units 12/17/19  1244   WBC Thousand/uL 11 60*   HEMOGLOBIN g/dL 12 4   HEMATOCRIT % 39 7*   PLATELETS Thousands/uL 320   NEUTROS PCT % 82*   LYMPHS PCT % 10*   MONOS PCT % 7   EOS PCT % 0     Results from last 7 days   Lab Units 12/17/19  1244   POTASSIUM mmol/L 3 9   CHLORIDE mmol/L 108*   CO2 mmol/L 23   BUN mg/dL 27*   CREATININE mg/dL 0 80   CALCIUM mg/dL 9 3   ALK PHOS U/L 103   ALT U/L 22   AST U/L 18     Results from last 7 days   Lab Units 12/17/19  1244   INR  1 52*               Imaging: I have personally reviewed pertinent reports  XR chest 1 view portable   ED Interpretation by Karolyn Dickson MD (12/17 2158)   Cardiomegaly  Congestive heart failure  Bilateral pleural effusions  Final Result by Fabián Mackey MD (12/17 3386)      1    Apparent short interval increase in size of the cardiac silhouette is partially due to technique, but could also suggest pericardial effusion  Consider dedicated upright PA and lateral chest radiographs if the patient is able to confirm or refute    the finding  2   Pulmonary vascular congestion and bilateral pleural effusions  3   Patchy opacities at the right midlung field may represent atelectasis or early pneumonia  The study was marked in EPIC for significant notification  Workstation performed: [id number]             EKG, Pathology, and Other Studies Reviewed on Admission:   · EKG:  AFib    [facility] Records Reviewed: Yes     ** Please Note: This note has been constructed using a voice recognition system   **

## 2019-12-17 NOTE — RESPIRATORY THERAPY NOTE
RT Protocol Note  Otilio Horowitz 78 y o  female MRN: [id number]  Unit/Bed#: ICU 06 Encounter: [id number]    Assessment    Principal Problem:    Paroxysmal atrial fibrillation (HCC)  Active Problems:    Essential hypertension    Morbid obesity ([facility] [address] )    Major depressive disorder in full remission ([facility] [address] )    Mixed dyslipidemia    Acute diastolic congestive heart failure (HCC)    Leukemoid reaction      Home Pulmonary Medications:  None    Home Devices/Therapy: (P) Other (Comment)(None)    Past Medical History:   Diagnosis Date    Anxiety     Atrial fibrillation     Hyperlipidemia     Hypertension     Obesity      Social History     Socioeconomic History    Marital status:       Spouse name: None    Number of children: None    Years of education: None    Highest education level: None   Occupational History    None   Social Needs    Financial resource strain: None    Food insecurity:     Worry: None     Inability: None    Transportation needs:     Medical: None     Non-medical: None   Tobacco Use    Smoking status: Never Smoker    Smokeless tobacco: Never Used   Substance and Sexual Activity    Alcohol use: Never     Frequency: Never     Comment: social    Drug use: Never    Sexual activity: None   Lifestyle    Physical activity:     Days per week: None     Minutes per session: None    Stress: None   Relationships    Social connections:     Talks on phone: None     Gets together: None     Attends Mosque service: None     Active member of club or organization: None     Attends meetings of clubs or organizations: None     Relationship status: None    Intimate partner violence:     Fear of current or ex partner: None     Emotionally abused: None     Physically abused: None     Forced sexual activity: None   Other Topics Concern    None   Social History Narrative    None       Subjective         Objective    Physical Exam:   Assessment Type: (P) Assess only  General Appearance: (P) Alert, Awake  Respiratory Pattern: (P) Normal  Chest Assessment: (P) Chest expansion symmetrical  Bilateral Breath Sounds: (P) Clear  Cough: (P) None  O2 Device: (P) 2 l/m NC    Vitals:  Blood pressure 124/62, pulse (!) 123, temperature 99 1 °F (37 3 °C), temperature source Temporal, resp  rate 19, height 5' 2" (1 575 m), weight 127 kg (280 lb 10 3 oz), SpO2 94 %  Imaging and other studies: I have personally reviewed pertinent reports  O2 Device: (P) 2 l/m NC     Plan    Respiratory Plan: (P) No distress/Pulmonary history, Discontinue Protocol        Resp Comments: (P) Pt  assessed  No SOB or Respiratory distress noted  Pt  stated that she does not wear O2, take nebulizer treatments/MDI's or use BiPap/CPAP @ home  Pt  stated that she is supposed to schedule a sleep study, but has not done so yet  Pt  does not require any Respiratory intervention  Respiratory protocol DC'd

## 2019-12-17 NOTE — ASSESSMENT & PLAN NOTE
· Unspecified exact etiology  · Check a UA  · Check a procalcitonin level  · Hold off on antibiotic initiation just yet

## 2019-12-17 NOTE — ASSESSMENT & PLAN NOTE
· Admit to the ICU  · Continue Cardizem drip  · Continue apixaban for anticoagulation purposes  · Of note, patient is status post a SUSIE and a cardioversion in August of 2019  · Will consult Cardiology  · Unspecified exact trigger  · Will transition off of the Cardizem drip as soon as possible to oral antiarrhythmics

## 2019-12-17 NOTE — ASSESSMENT & PLAN NOTE
Wt Readings from Last 3 Encounters:   12/17/19 122 kg (270 lb)   09/12/19 115 kg (254 lb)   09/02/19 122 kg (270 lb)     · BNP is elevated at 354  · Chest x-ray is concerning for pulmonary vascular congestion and bilateral pleural effusions  · Patient is status post 80 mg of IV Lasix today in the ER  · Start 40 mg of IV Lasix b i d   · Monitor daily weight, input and output  · Will hold off on reordering a 2D echocardiogram since 1 was done a few months ago  · Defer to Cardiology for further management

## 2019-12-17 NOTE — PLAN OF CARE
Problem: CARDIOVASCULAR - ADULT  Goal: Maintains optimal cardiac output and hemodynamic stability  Description  INTERVENTIONS:  - Monitor I/O, vital signs and rhythm  - Monitor for S/S and trends of decreased cardiac output  - Assess quality of pulses, skin color and temperature  - Assess for signs of decreased coronary artery perfusion  - Instruct patient to report change in severity of symptoms   Outcome: Progressing     Problem: CARDIOVASCULAR - ADULT  Goal: Absence of cardiac dysrhythmias or at baseline rhythm  Description  INTERVENTIONS:  - Continuous cardiac monitoring, vital signs, obtain 12 lead EKG if ordered  - Administer antiarrhythmic and heart rate control medications as ordered  - Monitor electrolytes and administer replacement therapy as ordered  Outcome: Progressing

## 2019-12-17 NOTE — ED PROVIDER NOTES
History  Chief Complaint   Patient presents with    Shortness of Breath     started a week ago, progressively worse  "I'm in A-fib again "     Patient is a 65-year-old female  She presents to the emergency room for evaluation of atrial fibrillation  She has a history of the same  She denies any palpitations or near syncope  No chest pain  She has, however, has been experiencing shortness of breath  Her legs have become more swollen  In the past this is been indicative of atrial fibrillation  She is on oral anticoagulation  Symptoms are moderately severe without relieving factors  Symptoms started about a week ago and have been getting progressively worse  Prior to Admission Medications   Prescriptions Last Dose Informant Patient Reported? Taking? amLODIPine-benazepril (LOTREL) 10-40 MG per capsule   Yes No   Sig: Take by mouth daily   apixaban (ELIQUIS) 5 mg   No No   Sig: Take 1 tablet (5 mg total) by mouth 2 (two) times a day   atorvastatin (LIPITOR) 40 mg tablet   Yes No   Sig: atorvastatin 40 mg tablet   take one tab by mouth once daily   escitalopram (LEXAPRO) 20 mg tablet   Yes No   Sig: Daily      Facility-Administered Medications: None       Past Medical History:   Diagnosis Date    Anxiety     Atrial fibrillation     Hyperlipidemia     Hypertension     Obesity        Past Surgical History:   Procedure Laterality Date    CARDIOVERSION      TONSILLECTOMY         Family History   Problem Relation Age of Onset    Heart attack Mother     Hypertension Mother      I have reviewed and agree with the history as documented  Social History     Tobacco Use    Smoking status: Never Smoker    Smokeless tobacco: Never Used   Substance Use Topics    Alcohol use: Yes     Comment: social    Drug use: Never        Review of Systems   Constitutional: Negative for chills and fever  HENT: Negative for rhinorrhea and sore throat  Eyes: Negative for pain, redness and visual disturbance  Respiratory: Positive for shortness of breath  Negative for cough  Cardiovascular: Positive for leg swelling  Negative for chest pain and palpitations  Gastrointestinal: Negative for abdominal pain, diarrhea and vomiting  Endocrine: Negative for polydipsia and polyuria  Genitourinary: Negative for dysuria, frequency, hematuria, vaginal bleeding and vaginal discharge  Musculoskeletal: Negative for back pain and neck pain  Skin: Negative for rash and wound  Allergic/Immunologic: Negative for immunocompromised state  Neurological: Negative for weakness, numbness and headaches  Hematological: Does not bruise/bleed easily  Psychiatric/Behavioral: Negative for hallucinations and suicidal ideas  All other systems reviewed and are negative  Physical Exam  Physical Exam   Constitutional: She is oriented to person, place, and time  She appears well-developed and well-nourished  No distress  Obese female  HENT:   Head: Normocephalic and atraumatic  Mouth/Throat: Oropharynx is clear and moist    Eyes: Conjunctivae are normal  Right eye exhibits no discharge  Left eye exhibits no discharge  No scleral icterus  Neck: Normal range of motion  Neck supple  Cardiovascular: Normal heart sounds and intact distal pulses  Exam reveals no gallop and no friction rub  No murmur heard  Tachycardic irregular regular rate and rhythm  Pulmonary/Chest: Effort normal and breath sounds normal  No stridor  No respiratory distress  She has no wheezes  She has no rales  Abdominal: Soft  Bowel sounds are normal  She exhibits no distension  There is no tenderness  There is no rebound and no guarding  Musculoskeletal: Normal range of motion  She exhibits no tenderness or deformity  Right lower leg: She exhibits edema  Left lower leg: She exhibits edema  Neurological: She is alert and oriented to person, place, and time  She has normal strength  No sensory deficit  GCS eye subscore is 4  GCS verbal subscore is 5  GCS motor subscore is 6  Skin: Skin is warm and dry  No rash noted  Psychiatric: She has a normal mood and affect  Her behavior is normal    Vitals reviewed        Vital Signs  ED Triage Vitals   Temperature Pulse Respirations Blood Pressure SpO2   12/17/19 1231 12/17/19 1230 12/17/19 1231 12/17/19 1230 12/17/19 1230   98 3 °F (36 8 °C) (!) 162 22 155/90 93 %      Temp Source Heart Rate Source Patient Position - Orthostatic VS BP Location FiO2 (%)   12/17/19 1231 12/17/19 1231 12/17/19 1231 12/17/19 1231 --   Temporal Monitor Lying Left arm       Pain Score       12/17/19 1231       No Pain           Vitals:    12/17/19 1330 12/17/19 1345 12/17/19 1400 12/17/19 1430   BP: 115/60 121/65 140/64 152/69   Pulse: (!) 134 (!) 137 (!) 134 (!) 134   Patient Position - Orthostatic VS:             Visual Acuity      ED Medications  Medications   diltiazem (CARDIZEM) injection 30 mg (30 mg Intravenous Given 12/17/19 1327)   diltiazem (CARDIZEM) 125 mg in sodium chloride 0 9 % 125 mL infusion (5 mg/hr Intravenous New Bag 12/17/19 1336)   furosemide (LASIX) injection 80 mg (80 mg Intravenous Given 12/17/19 1332)   diltiazem (CARDIZEM) injection 42 5 mg (42 5 mg Intravenous Given 12/17/19 1435)       Diagnostic Studies  Results Reviewed     Procedure Component Value Units Date/Time    Protime-INR [[id number]]  (Abnormal) Collected:  12/17/19 1244    Lab Status:  Final result Specimen:  Blood from Arm, Right Updated:  12/17/19 1325     Protime 17 7 seconds      INR 1 52    B-Type Natriuretic Peptide ([facility]) [[id number]]  (Abnormal) Collected:  12/17/19 1244    Lab Status:  Final result Specimen:  Blood from Arm, Right Updated:  12/17/19 1318      pg/mL     UA w Reflex to Microscopic w Reflex to Culture [[id number]]     Lab Status:  No result Specimen:  Urine     Troponin I [[id number]]  (Normal) Collected:  12/17/19 1244    Lab Status:  Final result Specimen:  Blood from Arm, Right Updated: 12/17/19 1312     Troponin I <0 03 ng/mL     Comprehensive metabolic panel [[id number]]  (Abnormal) Collected:  12/17/19 1244    Lab Status:  Final result Specimen:  Blood from Arm, Right Updated:  12/17/19 1309     Sodium 143 mmol/L      Potassium 3 9 mmol/L      Chloride 108 mmol/L      CO2 23 mmol/L      ANION GAP 12 mmol/L      BUN 27 mg/dL      Creatinine 0 80 mg/dL      Glucose 148 mg/dL      Calcium 9 3 mg/dL      AST 18 U/L      ALT 22 U/L      Alkaline Phosphatase 103 U/L      Total Protein 6 9 g/dL      Albumin 4 4 g/dL      Total Bilirubin 0 90 mg/dL      eGFR 75 ml/min/1 73sq m     Narrative:       National Kidney Disease Foundation guidelines for Chronic Kidney Disease (CKD):     Stage 1 with normal or high GFR (GFR > 90 mL/min/1 73 square meters)    Stage 2 Mild CKD (GFR = 60-89 mL/min/1 73 square meters)    Stage 3A Moderate CKD (GFR = 45-59 mL/min/1 73 square meters)    Stage 3B Moderate CKD (GFR = 30-44 mL/min/1 73 square meters)    Stage 4 Severe CKD (GFR = 15-29 mL/min/1 73 square meters)    Stage 5 End Stage CKD (GFR <15 mL/min/1 73 square meters)  Note: GFR calculation is accurate only with a steady state creatinine    CBC and differential [[id number]]  (Abnormal) Collected:  12/17/19 1244    Lab Status:  Final result Specimen:  Blood from Arm, Right Updated:  12/17/19 1257     WBC 11 60 Thousand/uL      RBC 4 40 Million/uL      Hemoglobin 12 4 g/dL      Hematocrit 39 7 %      MCV 90 fL      MCH 28 2 pg      MCHC 31 2 g/dL      RDW 15 6 %      MPV 7 9 fL      Platelets 120 Thousands/uL      Neutrophils Relative 82 %      Lymphocytes Relative 10 %      Monocytes Relative 7 %      Eosinophils Relative 0 %      Basophils Relative 1 %      Neutrophils Absolute 9 50 Thousands/µL      Lymphocytes Absolute 1 20 Thousands/µL      Monocytes Absolute 0 70 Thousand/µL      Eosinophils Absolute 0 00 Thousand/µL      Basophils Absolute 0 10 Thousands/µL                  XR chest 1 view portable   ED Interpretation by Renny Hill MD (12/17 3204)   Cardiomegaly  Congestive heart failure  Bilateral pleural effusions  Final Result by Dania Grey MD (12/17 5661)      1  Apparent short interval increase in size of the cardiac silhouette is partially due to technique, but could also suggest pericardial effusion  Consider dedicated upright PA and lateral chest radiographs if the patient is able to confirm or refute    the finding  2   Pulmonary vascular congestion and bilateral pleural effusions  3   Patchy opacities at the right midlung field may represent atelectasis or early pneumonia  The study was marked in EPIC for significant notification  Workstation performed: [id number]                    Procedures  ECG 12 Lead Documentation Only  Date/Time: 12/17/2019 1:27 PM  Performed by: Renny Hill MD  Authorized by: Renny Hill MD     ECG reviewed by me, the ED Provider: yes    Patient location:  ED  Comments:      Atrial fibrillation with rapid ventricular response at 158 beats per minute  Low voltage QRS  Nonspecific T-wave abnormality      CriticalCare Time  Performed by: Renny Hill MD  Authorized by: Renny Hill MD     Critical care provider statement:     Critical care time (minutes):  60    Critical care time was exclusive of:  Separately billable procedures and treating other patients    Critical care was necessary to treat or prevent imminent or life-threatening deterioration of the following conditions:  Cardiac failure (Cardiac arrhythmia)    Critical care was time spent personally by me on the following activities:  Obtaining history from patient or surrogate, development of treatment plan with patient or surrogate, discussions with consultants, evaluation of patient's response to treatment, examination of patient, ordering and performing treatments and interventions, ordering and review of laboratory studies, ordering and review of radiographic studies and re-evaluation of patient's condition    I assumed direction of critical care for this patient from another provider in my specialty: no               ED Course                               MDM  Number of Diagnoses or Management Options  Acute exacerbation of CHF (congestive heart failure) [facility]):   Atrial fibrillation with rapid ventricular response [facility]):   Diagnosis management comments: Rate did improve with Cardizem  Still in the 130s  Repeat bolus ordered  Drip increased  Consulted with hospitalist for admission  Patient was also treated for congestive heart failure  BNP was elevated  Chest x-ray was consistent with congestive heart failure  Amount and/or Complexity of Data Reviewed  Clinical lab tests: ordered and reviewed  Tests in the radiology section of CPT®: ordered and reviewed  Discuss the patient with other providers: yes  Independent visualization of images, tracings, or specimens: yes          Disposition  Final diagnoses:   Atrial fibrillation with rapid ventricular response ([facility] [address] )   Acute exacerbation of CHF (congestive heart failure) ([facility] [address] )     Time reflects when diagnosis was documented in both MDM as applicable and the Disposition within this note     Time User Action Codes Description Comment    12/17/2019  2:45 PM Thai Chang [I48 91] Atrial fibrillation with rapid ventricular response ([facility] [address] )     12/17/2019  2:45 PM Thai Araujo Add [I50 9] Acute exacerbation of CHF (congestive heart failure) [facility])       ED Disposition     ED Disposition Condition Date/Time Comment    Admit Stable Tue Dec 17, 2019  2:44 PM         Follow-up Information    None         Patient's Medications   Discharge Prescriptions    No medications on file     No discharge procedures on file      ED Provider  Electronically Signed by           Chrystal Fofana MD  12/17/19 3865

## 2019-12-18 PROBLEM — I50.30 HEART FAILURE WITH PRESERVED EJECTION FRACTION (HCC): Status: ACTIVE | Noted: 2019-12-17

## 2019-12-18 LAB
ALBUMIN SERPL BCP-MCNC: 3.6 G/DL (ref 3.5–5.7)
ALP SERPL-CCNC: 81 U/L (ref 55–165)
ALT SERPL W P-5'-P-CCNC: 17 U/L (ref 7–52)
ANION GAP SERPL CALCULATED.3IONS-SCNC: 8 MMOL/L (ref 4–13)
AST SERPL W P-5'-P-CCNC: 17 U/L (ref 13–39)
BASOPHILS # BLD AUTO: 0 THOUSANDS/ΜL (ref 0–0.1)
BASOPHILS NFR BLD AUTO: 1 % (ref 0–2)
BILIRUB SERPL-MCNC: 0.8 MG/DL (ref 0.2–1)
BUN SERPL-MCNC: 24 MG/DL (ref 7–25)
CALCIUM SERPL-MCNC: 8.8 MG/DL (ref 8.6–10.5)
CHLORIDE SERPL-SCNC: 108 MMOL/L (ref 98–107)
CO2 SERPL-SCNC: 26 MMOL/L (ref 21–31)
CREAT SERPL-MCNC: 0.74 MG/DL (ref 0.6–1.2)
EOSINOPHIL # BLD AUTO: 0.1 THOUSAND/ΜL (ref 0–0.61)
EOSINOPHIL NFR BLD AUTO: 2 % (ref 0–5)
ERYTHROCYTE [DISTWIDTH] IN BLOOD BY AUTOMATED COUNT: 15.2 % (ref 11.5–14.5)
GFR SERPL CREATININE-BSD FRML MDRD: 82 ML/MIN/1.73SQ M
GLUCOSE SERPL-MCNC: 95 MG/DL (ref 65–99)
HCT VFR BLD AUTO: 33.3 % (ref 42–47)
HGB BLD-MCNC: 10.6 G/DL (ref 12–16)
LYMPHOCYTES # BLD AUTO: 1.4 THOUSANDS/ΜL (ref 0.6–4.47)
LYMPHOCYTES NFR BLD AUTO: 18 % (ref 21–51)
MAGNESIUM SERPL-MCNC: 2.2 MG/DL (ref 1.9–2.7)
MCH RBC QN AUTO: 28.6 PG (ref 26–34)
MCHC RBC AUTO-ENTMCNC: 31.8 G/DL (ref 31–37)
MCV RBC AUTO: 90 FL (ref 81–99)
MONOCYTES # BLD AUTO: 0.7 THOUSAND/ΜL (ref 0.17–1.22)
MONOCYTES NFR BLD AUTO: 9 % (ref 2–12)
NEUTROPHILS # BLD AUTO: 5.5 THOUSANDS/ΜL (ref 1.4–6.5)
NEUTS SEG NFR BLD AUTO: 71 % (ref 42–75)
PHOSPHATE SERPL-MCNC: 3.6 MG/DL (ref 3–5.5)
PLATELET # BLD AUTO: 234 THOUSANDS/UL (ref 149–390)
PMV BLD AUTO: 8.3 FL (ref 8.6–11.7)
POTASSIUM SERPL-SCNC: 3.8 MMOL/L (ref 3.5–5.5)
PROT SERPL-MCNC: 5.7 G/DL (ref 6.4–8.9)
RBC # BLD AUTO: 3.71 MILLION/UL (ref 3.9–5.2)
SODIUM SERPL-SCNC: 142 MMOL/L (ref 134–143)
WBC # BLD AUTO: 7.8 THOUSAND/UL (ref 4.8–10.8)

## 2019-12-18 PROCEDURE — 85025 COMPLETE CBC W/AUTO DIFF WBC: CPT | Performed by: HOSPITALIST

## 2019-12-18 PROCEDURE — 84100 ASSAY OF PHOSPHORUS: CPT | Performed by: HOSPITALIST

## 2019-12-18 PROCEDURE — 80053 COMPREHEN METABOLIC PANEL: CPT | Performed by: HOSPITALIST

## 2019-12-18 PROCEDURE — 99222 1ST HOSP IP/OBS MODERATE 55: CPT | Performed by: INTERNAL MEDICINE

## 2019-12-18 PROCEDURE — 99232 SBSQ HOSP IP/OBS MODERATE 35: CPT | Performed by: INTERNAL MEDICINE

## 2019-12-18 PROCEDURE — 83735 ASSAY OF MAGNESIUM: CPT | Performed by: HOSPITALIST

## 2019-12-18 RX ORDER — LANOLIN ALCOHOL/MO/W.PET/CERES
9 CREAM (GRAM) TOPICAL
Status: DISCONTINUED | OUTPATIENT
Start: 2019-12-18 | End: 2019-12-20 | Stop reason: HOSPADM

## 2019-12-18 RX ORDER — METOPROLOL TARTRATE 50 MG/1
50 TABLET, FILM COATED ORAL EVERY 12 HOURS SCHEDULED
Status: DISCONTINUED | OUTPATIENT
Start: 2019-12-18 | End: 2019-12-20 | Stop reason: HOSPADM

## 2019-12-18 RX ORDER — FUROSEMIDE 20 MG/1
20 TABLET ORAL DAILY
Status: DISCONTINUED | OUTPATIENT
Start: 2019-12-19 | End: 2019-12-20 | Stop reason: HOSPADM

## 2019-12-18 RX ORDER — METOPROLOL TARTRATE 5 MG/5ML
5 INJECTION INTRAVENOUS EVERY 6 HOURS PRN
Status: DISCONTINUED | OUTPATIENT
Start: 2019-12-18 | End: 2019-12-20 | Stop reason: HOSPADM

## 2019-12-18 RX ADMIN — MELATONIN 9 MG: at 21:48

## 2019-12-18 RX ADMIN — Medication 15 MG/HR: at 08:37

## 2019-12-18 RX ADMIN — DILTIAZEM HYDROCHLORIDE 90 MG: 60 TABLET, FILM COATED ORAL at 13:05

## 2019-12-18 RX ADMIN — ESCITALOPRAM 10 MG: 5 TABLET, FILM COATED ORAL at 08:14

## 2019-12-18 RX ADMIN — METOPROLOL TARTRATE 50 MG: 50 TABLET, FILM COATED ORAL at 15:18

## 2019-12-18 RX ADMIN — METOPROLOL TARTRATE 5 MG: 1 INJECTION, SOLUTION INTRAVENOUS at 06:09

## 2019-12-18 RX ADMIN — APIXABAN 5 MG: 5 TABLET, FILM COATED ORAL at 17:23

## 2019-12-18 RX ADMIN — MELATONIN 9 MG: at 02:56

## 2019-12-18 RX ADMIN — FUROSEMIDE 40 MG: 10 INJECTION, SOLUTION INTRAMUSCULAR; INTRAVENOUS at 08:19

## 2019-12-18 RX ADMIN — DILTIAZEM HYDROCHLORIDE 90 MG: 60 TABLET, FILM COATED ORAL at 17:23

## 2019-12-18 RX ADMIN — APIXABAN 5 MG: 5 TABLET, FILM COATED ORAL at 08:14

## 2019-12-18 RX ADMIN — ATORVASTATIN CALCIUM 40 MG: 40 TABLET, FILM COATED ORAL at 17:23

## 2019-12-18 RX ADMIN — METOPROLOL TARTRATE 50 MG: 50 TABLET, FILM COATED ORAL at 20:42

## 2019-12-18 RX ADMIN — AMLODIPINE BESYLATE 10 MG: 5 TABLET ORAL at 09:54

## 2019-12-18 RX ADMIN — LISINOPRIL 40 MG: 20 TABLET ORAL at 09:54

## 2019-12-18 RX ADMIN — METOPROLOL TARTRATE 5 MG: 1 INJECTION, SOLUTION INTRAVENOUS at 00:29

## 2019-12-18 RX ADMIN — DILTIAZEM HYDROCHLORIDE 90 MG: 60 TABLET, FILM COATED ORAL at 23:57

## 2019-12-18 NOTE — PHYSICAL THERAPY NOTE
Physical Therapy Cancellation Note    PT order received  Chart review performed  At this time, PT evaluation cancelled as pt noted to have HR in 130's on telemetry  RN agreeable to PT holding on evaluation at this time  PT will follow and evaluate as appropriate      Chandra Sheffield PT

## 2019-12-18 NOTE — NURSING NOTE
Patient awake most of night watching TV  Patient did relate that she typically has problems sleeping in the hospital  PA was made aware and an order for melatonin was received and administered  Patient has denied any chest pain or chest discomfort  No N/V  No complaints offered

## 2019-12-18 NOTE — CONSULTS
Consult- Tayla Donahue 1950, 78 y o  female MRN: [id number]    Unit/Bed#: ICU 06 Encounter: [id number]    Primary Care Provider: Elida Koyanagi, MD   Date and time admitted to hospital: 12/17/2019 12:27 PM      Inpatient consult to Cardiology  Consult performed by: Suzanne Hernandez MD  Consult ordered by: Nic Byrd MD          Heart failure with preserved ejection fraction [facility])  Assessment & Plan  Wt Readings from Last 3 Encounters:   12/18/19 128 kg (282 lb 3 oz)   09/12/19 115 kg (254 lb)   09/02/19 122 kg (270 lb)       Mild  Related to high rate and afib  Rate control will be key here  Essential hypertension  Assessment & Plan  More than well controlled  Since diltiazem is being reacted, will discontinue amlodipine  * Paroxysmal atrial fibrillation (HCC)  Assessment & Plan  Recurrent  She did not feel any better after transesophageal cardioversion in the past   At this point will concentrate on rate control  Other summary comments:   Rate control will be the object at present  I note prior discussion with EP as well as with her usual cardiologist regarding further steps that could be taken  Beta-blocker as well as oral diltiazem being we added to her regimen  HPI: Tayla Donahue is a 78y o  year old female who presented with several days of dyspnea  She came to emergency room was found to be in atrial fibrillation with a rapid rate  I am asked to comment further  It is notable that the patient has presented before with atrial fibrillation  Ultimately she underwent transesophageal guided cardioversion on August 30th of this year  After that beta-blocker was discontinued  She does not believe that she was appreciably less dyspneic after cardioversion  There has been no chest pain or chest pressure  EKG:   Atrial fibrillation with a rapid ventricular response      MOST  RECENT CARDIAC IMAGING:   Echocardiogram 08/26/2019:  Normal left ventricular systolic function  Review of Systems: a 10 point review of systems was conducted and is negative except for as mentioned in the HPI or as below  Mild chronic dyspnea although worse of late  Mild leg edema  Historical Information   Past Medical History:   Diagnosis Date    Anxiety     Atrial fibrillation     Hyperlipidemia     Hypertension     Obesity      Past Surgical History:   Procedure Laterality Date    CARDIOVERSION      TONSILLECTOMY       Social History     Substance and Sexual Activity   Alcohol Use Never    Frequency: Never    Comment: social     Social History     Substance and Sexual Activity   Drug Use Never     Social History     Tobacco Use   Smoking Status Never Smoker   Smokeless Tobacco Never Used       Family History:   No longer relevant  Meds/Allergies   all current active meds have been reviewed  Medications Prior to Admission   Medication    amLODIPine-benazepril (LOTREL) 10-40 MG per capsule    apixaban (ELIQUIS) 5 mg    atorvastatin (LIPITOR) 40 mg tablet    escitalopram (LEXAPRO) 20 mg tablet       Allergies   Allergen Reactions    Other Itching     lobster    Penicillins Rash       Objective   Vitals: Blood pressure 119/77, pulse (!) 125, temperature 99 3 °F (37 4 °C), temperature source Temporal, resp  rate (!) 23, height 5' 2" (1 575 m), weight 128 kg (282 lb 3 oz), SpO2 90 %  , Body mass index is 51 61 kg/m² ,   Orthostatic Blood Pressures      Most Recent Value   Blood Pressure  119/77 filed at 12/18/2019 1100   Patient Position - Orthostatic VS  Lying filed at 12/18/2019 1794          Systolic (28WJM), XAA:120 , Min:103 , LDZ:863     Diastolic (58KVU), XPY:16, Min:53, Max:91              Physical Exam:    General:  Normal appearance in no distress  Eyes:  Anicteric  Oral mucosa:  Moist   Neck:  No JVD  Carotid upstrokes are brisk without bruits  No masses  Chest:  Clear to auscultation and percussion  Cardiac:  Irregularly irregular  No palpable PMI    Normal S1 and S2  No murmur gallop or rub  Abdomen:  Soft and nontender  No palpable organomegaly or aortic enlargement  Extremities:  1+ pretibial edema  Musculoskeletal:  Symmetric  Vascular:  Femoral pulses are brisk without bruits  Popliteal  pulses are intact bilaterally  Pedal pulses are intact  Neuro:  Grossly symmetric  Psych:  Alert and oriented x3          Lab Results:     Troponins:   Results from last 7 days   Lab Units 12/17/19  1244   TROPONIN I ng/mL <0 03     BNP:   Results from last 6 Months   Lab Units 12/17/19  1244 08/26/19  0918   BNP pg/mL 354* 499*       CBC :   Results from last 7 days   Lab Units 12/18/19  0442 12/17/19  1244   WBC Thousand/uL 7 80 11 60*   HEMOGLOBIN g/dL 10 6* 12 4   HEMATOCRIT % 33 3* 39 7*   MCV fL 90 90   PLATELETS Thousands/uL 234 320     TSH:     CMP:   Results from last 7 days   Lab Units 12/18/19  0442 12/17/19  1244   POTASSIUM mmol/L 3 8 3 9   CHLORIDE mmol/L 108* 108*   CO2 mmol/L 26 23   BUN mg/dL 24 27*   CREATININE mg/dL 0 74 0 80   AST U/L 17 18   ALT U/L 17 22   EGFR ml/min/1 73sq m 82 75     Lipid Profile:     Coags:   Results from last 7 days   Lab Units 12/17/19  1244   INR  1 52*

## 2019-12-18 NOTE — ASSESSMENT & PLAN NOTE
Recurrent  She did not feel any better after transesophageal cardioversion in the past   At this point will concentrate on rate control

## 2019-12-18 NOTE — SOCIAL WORK
Evaluated the pt at the bedside  Pt was admitted to the hospital with CHF  Explained the role of CM with the pt and the options of discharge planning  Pt states she lives in a ranch home alone and has 1 AZAM  Pt states she is independent and works full time at the [employer]  Pt states she has a walker and cane at home, however walks independently  Pt gets her medications at [facility]  Pt indicated her cousin will transport her home upon discharge  Pt daughter is coming from [place] and will be here Friday to stay with her for a while  Pt does not wear oxygen at home  Pt reports she will most likley have no needs  Patient/caregiver received discharge checklist   Content reviewed  Patient/caregiver encouraged to participate in discharge plan of care prior to discharge home  CM reviewed d/c planning process including the following: identifying help at home, patient preference for d/c planning needs, availability of treatment team to discuss questions or concerns patient and/or family may have regarding understanding medications and recognizing signs and symptoms once discharged  CM also encouraged patient to follow up with all recommended appointments after discharge  Patient advised of importance for patient and family to participate in managing patients medical well being

## 2019-12-18 NOTE — ASSESSMENT & PLAN NOTE
Wt Readings from Last 3 Encounters:   12/18/19 128 kg (282 lb 3 oz)   09/12/19 115 kg (254 lb)   09/02/19 122 kg (270 lb)       Mild  Related to high rate and afib  Rate control will be key here

## 2019-12-18 NOTE — NURSING NOTE
Awake, alert, and oriented x4  Denies pain, sob, dizziness, lightheadedness, chest pain, or palpitations  Hr 133 intermittently, and afib  Assessment as noted in computer charting  cardizem gtt continues at 15 mg/hr  Safety in place

## 2019-12-18 NOTE — UTILIZATION REVIEW
Initial Clinical Review    Admission: Date/Time/Statement: Inpatient Admission Orders (From admission, onward)     Ordered        12/17/19 1445  Inpatient Admission (expected length of stay for this patient Order details is greater than two midnights)  Once                   Orders Placed This Encounter   Procedures    Inpatient Admission (expected length of stay for this patient Order details is greater than two midnights)     Standing Status:   Standing     Number of Occurrences:   1     Order Specific Question:   Admitting Physician     Answer:   Colt Ramírez [[id number]]     Order Specific Question:   Level of Care     Answer:   Critical Care [15]     Order Specific Question:   Estimated length of stay     Answer:   More than 2 Midnights     Order Specific Question:   Certification     Answer:   I certify that inpatient services are medically necessary for this patient for a duration of greater than two midnights  See H&P and MD Progress Notes for additional information about the patient's course of treatment  ED Arrival Information     Expected Arrival Acuity Means of Arrival Escorted By Service Admission Type    - 12/17/2019 12:17 Emergent Walk-In Family Member General Medicine Emergency    Arrival Complaint    shortness of breath        Chief Complaint   Patient presents with    Shortness of Breath     started a week ago, progressively worse  "I'm in A-fib again "     Assessment/Plan:   79 yo female ambulatory to ER from home c/o progressive sob, increased ble edema for approx 1 week  Hx afib on eliquis, hld, htn, obesity  Presents tachycardic, sob with ble edema  Admission work-up showing afib with rvr  Admitted to inpatient status for PAF, started on iv cardizem gtt & iv lasix, cardio consulted    ED Triage Vitals   Temperature Pulse Respirations Blood Pressure SpO2   12/17/19 1231 12/17/19 1230 12/17/19 1231 12/17/19 1230 12/17/19 1230   98 3 °F (36 8 °C) (!) 162 22 155/90 93 %      Temp Source Heart Rate Source Patient Position - Orthostatic VS BP Location FiO2 (%)   12/17/19 1231 12/17/19 1231 12/17/19 1231 12/17/19 1231 --   Temporal Monitor Lying Left arm       Pain Score       12/17/19 1231       No Pain        Wt Readings from Last 1 Encounters:   12/18/19 128 kg (282 lb 3 oz)     Additional Vital Signs:   12/17/19 1645  99 °F (37 2 °C)  128Abnormal   22  151/77  102  95 %  Nasal cannula  Lying   12/17/19 1600    124Abnormal   19  121/75           12/17/19 1530    133Abnormal   26Abnormal   116/61           12/17/19 1500    121Abnormal   19  110/72           12/17/19 1430    134Abnormal   20  152/69           12/17/19 1400    134Abnormal   35Abnormal   140/64           12/17/19 1345    137Abnormal   19  121/65    94 %       12/17/19 1330    134Abnormal   17  115/60    94 %       12/17/19 1231  98 3 °F (36 8 °C)  171Abnormal   22  155/90    91 %  None (Room air)  Lying   12/17/19 1230    162Abnormal     155/90    93 %       Pertinent Labs/Diagnostic Test Results:   Results from last 7 days   Lab Units 12/18/19  0442 12/17/19  1244   WBC Thousand/uL 7 80 11 60*   HEMOGLOBIN g/dL 10 6* 12 4   HEMATOCRIT % 33 3* 39 7*   PLATELETS Thousands/uL 234 320   NEUTROS ABS Thousands/µL 5 50 9 50*     Results from last 7 days   Lab Units 12/18/19  0442 12/17/19  1244   SODIUM mmol/L 142 143   POTASSIUM mmol/L 3 8 3 9   CHLORIDE mmol/L 108* 108*   CO2 mmol/L 26 23   ANION GAP mmol/L 8 12   BUN mg/dL 24 27*   CREATININE mg/dL 0 74 0 80   EGFR ml/min/1 73sq m 82 75   CALCIUM mg/dL 8 8 9 3   MAGNESIUM mg/dL 2 2  --    PHOSPHORUS mg/dL 3 6  --      Results from last 7 days   Lab Units 12/18/19  0442 12/17/19  1244   AST U/L 17 18   ALT U/L 17 22   ALK PHOS U/L 81 103   TOTAL PROTEIN g/dL 5 7* 6 9   ALBUMIN g/dL 3 6 4 4   TOTAL BILIRUBIN mg/dL 0 80 0 90     Results from last 7 days   Lab Units 12/18/19  0442 12/17/19  1244   GLUCOSE RANDOM mg/dL 95 148*     Results from last 7 days Lab Units 12/17/19  1244   TROPONIN I ng/mL <0 03     Results from last 7 days   Lab Units 12/17/19  1244   PROTIME seconds 17 7*   INR  1 52*     Results from last 7 days   Lab Units 12/17/19  1244   TSH 3RD GENERATON uIU/mL 2 310     Results from last 7 days   Lab Units 12/17/19  1732   PROCALCITONIN ng/ml <0 05     Results from last 7 days   Lab Units 12/17/19  1244   BNP pg/mL 354*     Results from last 7 days   Lab Units 12/17/19  1803   CLARITY UA  Clear   COLOR UA  Yellow   SPEC GRAV UA  1 010   PH UA  5 0   GLUCOSE UA mg/dl Negative   KETONES UA mg/dl Negative   BLOOD UA  Negative   PROTEIN UA mg/dl Negative   NITRITE UA  Negative   BILIRUBIN UA  Negative   UROBILINOGEN UA E U /dl 0 2   LEUKOCYTES UA  Negative     12/17  cxr=1  Apparent short interval increase in size of the cardiac silhouette is partially due to technique, but could also suggest pericardial effusion  Consider dedicated upright PA and lateral chest radiographs if the patient is able to confirm or refute the finding  2   Pulmonary vascular congestion and bilateral pleural effusions    3   Patchy opacities at the right midlung field may represent atelectasis or early pneumonia   ekg=Atrial fibrillation with rapid ventricular response  Low voltage QRS  Nonspecific T wave abnormality  ED Treatment:   Medication Administration from 12/17/2019 1217 to 12/17/2019 1637       Date/Time Order Dose Route     12/17/2019 1327 diltiazem (CARDIZEM) injection 30 mg 30 mg Intravenous     12/17/2019 1630 diltiazem (CARDIZEM) 125 mg in sodium chloride 0 9 % 125 mL infusion 7 5 mg/hr Intravenous     12/17/2019 1336 diltiazem (CARDIZEM) 125 mg in sodium chloride 0 9 % 125 mL infusion 5 mg/hr Intravenous     12/17/2019 1332 furosemide (LASIX) injection 80 mg 80 mg Intravenous     12/17/2019 1435 diltiazem (CARDIZEM) injection 42 5 mg 42 5 mg Intravenous        Past Medical History:   Diagnosis Date    Anxiety     Atrial fibrillation     Hyperlipidemia     Hypertension     Obesity      Present on Admission:   Morbid obesity ([facility] [address] )   Essential hypertension   Paroxysmal atrial fibrillation (HCC)   Major depressive disorder in full remission ([facility] )  Admitting Diagnosis: Shortness of breath [R06 02]  Acute exacerbation of CHF (congestive heart failure) (HCC) [I50 9]  Atrial fibrillation with rapid ventricular response (HCC) [I48 91]  Age/Sex: 78 y o  female  Admission Orders:  ICU  Consult cardio  Pt/ot eval & tx  venodynes  Scheduled Medications:  apixaban 5 mg Oral BID   atorvastatin 40 mg Oral Daily With Dinner   diltiazem 90 mg Oral Q6H [address]   docusate sodium 100 mg Oral BID   escitalopram 10 mg Oral Daily   [START ON 12/19/2019] furosemide 20 mg Oral Daily   lisinopril 40 mg Oral Daily   melatonin 9 mg Oral HS   metoprolol tartrate 50 mg Oral Q12H FRANCISCO   furosemide (LASIX) injection 40 mg   Dose: 40 mg  Freq: 2 times daily (diuretic) Route: IV    Continuous IV Infusions:  diltiazem 1-15 mg/hr Intravenous Titrated     PRN Meds:  acetaminophen 650 mg Oral Q6H PRN   metoprolol 5 mg Intravenous Q6H PRN   ondansetron 4 mg Intravenous Q6H PRN     Network Utilization Review Department  [email or web address]  ATTENTION: Please call with any questions or concerns to [phone] and carefully listen to the prompts so that you are directed to the right person  All voicemails are confidential   Maira Dumont all requests for admission clinical reviews, approved or denied determinations and any other requests to dedicated fax number below belonging to the campus where the patient is receiving treatment   List of dedicated fax numbers for the Facilities:  FACILITY NAME UR FAX NUMBER   ADMISSION DENIALS (Administrative/Medical Necessity) [phone]   [address]  (Maternity/NICU/Pediatrics) [phone]   [facility] [address] [phone]   [facility]  [phone]   [address] [facility]  [phone]   [address] [place] [address] [phone]

## 2019-12-18 NOTE — PROGRESS NOTES
Progress Note - Nita Constant 1950, [address] y o  female MRN: [id number]    Unit/Bed#: ICU 06 Encounter: [id number]    Primary Care Provider: Lynne Trujillo MD   Date and time admitted to hospital: 12/17/2019 12:27 PM        Acute diastolic congestive heart failure ([facility] [address] )  Assessment & Plan  Wt Readings from Last 3 Encounters:   12/18/19 128 kg (282 lb 3 oz)   09/12/19 115 kg (254 lb)   09/02/19 122 kg (270 lb)     · BNP is elevated at 354  · Chest x-ray is concerning for pulmonary vascular congestion and bilateral pleural effusions  · Patient is status post 80 mg of IV Lasix today in the ER  · We will transition to oral Lasix  · Monitor daily weight, input and output  · Will hold off on reordering a 2D echocardiogram since 1 was done a few months ago  · Defer to Cardiology for further management        Morbid obesity ([facility] [address] )  Assessment & Plan  · BMI is 51  · Dietary, lifestyle, and weight loss modification counseling was provided    Essential hypertension  Assessment & Plan  · For the most part blood pressure is very well controlled  · Continue amlodipine and lisinopril    * Paroxysmal atrial fibrillation (HCC)  Assessment & Plan  · Currently on Cardizem drip  · Continue apixaban for anticoagulation purposes  · Of note, patient is status post a SUSIE and a cardioversion in August of 2019  · Await Cardiology input for transition to oral rate-controlling agents        VTE Pharmacologic Prophylaxis: Pharmacologic: Apixaban (Eliquis)    Patient Centered Rounds: I have performed bedside rounds with nursing staff today      Discussions with Specialists or Other Care Team Provider: cardiology  Education and Discussions with Family / Patient: patient    Current Length of Stay: 1 day(s)    Current Patient Status: Inpatient   Certification Statement: The patient will continue to require additional inpatient hospital stay due to afib with rvr    Discharge Plan: likely home tomorrow if rates are controlled    Code Status: Level 1 - Full Code    Subjective:   Patient seen examined  No acute events overnight  Patient without complaints at this time    Objective:     Vitals:   Temp (24hrs), Av 8 °F (37 1 °C), Min:98 1 °F (36 7 °C), Max:99 4 °F (37 4 °C)    Temp:  [98 1 °F (36 7 °C)-99 4 °F (37 4 °C)] 99 4 °F (37 4 °C)  HR:  [114-171] 123  Resp:  [17-35] 22  BP: (103-155)/(59-91) 116/66  SpO2:  [90 %-96 %] 91 %  Body mass index is 51 61 kg/m²  Input and Output Summary (last 24 hours): Intake/Output Summary (Last 24 hours) at 2019 0836  Last data filed at 2019 0450  Gross per 24 hour   Intake 681 96 ml   Output 350 ml   Net 331 96 ml       Physical Exam:     Physical Exam   Constitutional: She is oriented to person, place, and time  She appears well-developed and well-nourished  HENT:   Head: Normocephalic and atraumatic  Eyes: Pupils are equal, round, and reactive to light  EOM are normal    Neck: Normal range of motion  Neck supple  Cardiovascular:   Irregularly irregular, tachycardic   Pulmonary/Chest: Effort normal and breath sounds normal    Abdominal: Soft  She exhibits no distension  Obese   Musculoskeletal: Normal range of motion  She exhibits no edema  Neurological: She is alert and oriented to person, place, and time  Skin: Skin is warm  Capillary refill takes less than 2 seconds  Psychiatric: She has a normal mood and affect  Her behavior is normal  Judgment and thought content normal    Nursing note and vitals reviewed        Additional Data:     Labs:    Results from last 7 days   Lab Units 19  0442   WBC Thousand/uL 7 80   HEMOGLOBIN g/dL 10 6*   HEMATOCRIT % 33 3*   PLATELETS Thousands/uL 234   NEUTROS PCT % 71   LYMPHS PCT % 18*   MONOS PCT % 9   EOS PCT % 2     Results from last 7 days   Lab Units 19  0442   POTASSIUM mmol/L 3 8   CHLORIDE mmol/L 108*   CO2 mmol/L 26   BUN mg/dL 24   CREATININE mg/dL 0 74   CALCIUM mg/dL 8 8   ALK PHOS U/L 81   ALT U/L 17   AST U/L 17 Results from last 7 days   Lab Units 12/17/19  1244   INR  1 52*               * I Have Reviewed All Lab Data Listed Above  * Additional Pertinent Lab Tests Reviewed: Gilda [address] Admission  Reviewed    Imaging:  Imaging Reports Reviewed Today Include: n/a    Recent Cultures (last 7 days):           Last 24 Hours Medication List:     Current Facility-Administered Medications:  acetaminophen 650 mg Oral Q6H PRN Juan Pablo Hill MD    amLODIPine 10 mg Oral Daily Juan Pablo Hill MD    apixaban 5 mg Oral BID Juan Pablo Hill MD    atorvastatin 40 mg Oral Daily With [facility] Jose Jain MD    diltiazem 1-15 mg/hr Intravenous Titrated Juan Pablo Hill MD Last Rate: 15 mg/hr (12/17/19 2338)   docusate sodium 100 mg Oral BID Juan Pablo Hill MD    escitalopram 10 mg Oral Daily Juan Pablo Hill MD    [START ON 12/19/2019] furosemide 20 mg Oral Daily Shanti Bradley MD    lisinopril 40 mg Oral Daily Juan Pablo Hill MD    melatonin 9 mg Oral HS Mesfin Yip PA-C    metoprolol 5 mg Intravenous Q6H PRN Mesfin Yip PA-C    ondansetron 4 mg Intravenous Q6H PRN Juan Pablo Hill MD         Today, Patient Was Seen By: Shanti Bradley MD    ** Please Note: Dictation voice to text software may have been used in the creation of this document   **

## 2019-12-18 NOTE — ASSESSMENT & PLAN NOTE
· Currently on Cardizem drip  · Continue apixaban for anticoagulation purposes  · Of note, patient is status post a SUSIE and a cardioversion in August of 2019  · Await Cardiology input for transition to oral rate-controlling agents

## 2019-12-18 NOTE — NURSING NOTE
Brain ARNDT made aware that despite patient's cardizem gtt being maxed out since my coming on shift, the patient's heart rate remains consistently in the 120's with bursts into the 130-140's  Order received for prn metoprolol  Will monitor for effectiveness of medication

## 2019-12-18 NOTE — OCCUPATIONAL THERAPY NOTE
OT eval order received/noted  Chart reviewed  Attempted OT eval X 2 this am   Pt not stable/appropriate for OT eval at that time  Will attempt at a later time    Scott Begum, OT

## 2019-12-18 NOTE — PLAN OF CARE
Problem: DISCHARGE PLANNING - CARE MANAGEMENT  Goal: Discharge to post-acute care or home with appropriate resources  Description  INTERVENTIONS:  - Conduct assessment to determine patient/family and health care team treatment goals, and need for post-acute services based on payer coverage, community resources, and patient preferences, and barriers to discharge  - Address psychosocial, clinical, and financial barriers to discharge as identified in assessment in conjunction with the patient/family and health care team  - Arrange appropriate level of post-acute services according to patient's   needs and preference and payer coverage in collaboration with the physician and health care team  - Communicate with and update the patient/family, physician, and health care team regarding progress on the discharge plan  - Arrange appropriate transportation to post-acute venues  Discharge to home  Family will transport     Outcome: Progressing

## 2019-12-18 NOTE — ASSESSMENT & PLAN NOTE
Wt Readings from Last 3 Encounters:   12/18/19 128 kg (282 lb 3 oz)   09/12/19 115 kg (254 lb)   09/02/19 122 kg (270 lb)     · BNP is elevated at 354  · Chest x-ray is concerning for pulmonary vascular congestion and bilateral pleural effusions  · Patient is status post 80 mg of IV Lasix today in the ER  · We will transition to oral Lasix  · Monitor daily weight, input and output  · Will hold off on reordering a 2D echocardiogram since 1 was done a few months ago  · Defer to Cardiology for further management

## 2019-12-19 PROCEDURE — 99231 SBSQ HOSP IP/OBS SF/LOW 25: CPT | Performed by: INTERNAL MEDICINE

## 2019-12-19 PROCEDURE — 99232 SBSQ HOSP IP/OBS MODERATE 35: CPT | Performed by: INTERNAL MEDICINE

## 2019-12-19 RX ORDER — DILTIAZEM HYDROCHLORIDE 180 MG/1
360 CAPSULE, COATED, EXTENDED RELEASE ORAL DAILY
Status: DISCONTINUED | OUTPATIENT
Start: 2019-12-20 | End: 2019-12-20 | Stop reason: HOSPADM

## 2019-12-19 RX ORDER — LISINOPRIL 20 MG/1
20 TABLET ORAL DAILY
Status: DISCONTINUED | OUTPATIENT
Start: 2019-12-20 | End: 2019-12-20 | Stop reason: HOSPADM

## 2019-12-19 RX ADMIN — FUROSEMIDE 20 MG: 20 TABLET ORAL at 08:49

## 2019-12-19 RX ADMIN — METOPROLOL TARTRATE 50 MG: 50 TABLET, FILM COATED ORAL at 22:07

## 2019-12-19 RX ADMIN — DILTIAZEM HYDROCHLORIDE 90 MG: 60 TABLET, FILM COATED ORAL at 06:20

## 2019-12-19 RX ADMIN — ESCITALOPRAM 10 MG: 5 TABLET, FILM COATED ORAL at 08:49

## 2019-12-19 RX ADMIN — MELATONIN 9 MG: at 22:07

## 2019-12-19 RX ADMIN — METOPROLOL TARTRATE 50 MG: 50 TABLET, FILM COATED ORAL at 08:50

## 2019-12-19 RX ADMIN — APIXABAN 5 MG: 5 TABLET, FILM COATED ORAL at 08:50

## 2019-12-19 RX ADMIN — APIXABAN 5 MG: 5 TABLET, FILM COATED ORAL at 17:37

## 2019-12-19 RX ADMIN — DILTIAZEM HYDROCHLORIDE 300 MG: 180 CAPSULE, COATED, EXTENDED RELEASE ORAL at 10:20

## 2019-12-19 RX ADMIN — LISINOPRIL 40 MG: 20 TABLET ORAL at 08:49

## 2019-12-19 RX ADMIN — ATORVASTATIN CALCIUM 40 MG: 40 TABLET, FILM COATED ORAL at 17:37

## 2019-12-19 NOTE — ASSESSMENT & PLAN NOTE
Wt Readings from Last 3 Encounters:   12/19/19 124 kg (273 lb 13 oz)   09/12/19 115 kg (254 lb)   09/02/19 122 kg (270 lb)       Clinically resolved

## 2019-12-19 NOTE — ASSESSMENT & PLAN NOTE
More than well controlled  Since diltiazem is being reacted amlodipine had been discontinued  Will also lower the lisinopril dose

## 2019-12-19 NOTE — PROGRESS NOTES
Progress Note - Ollie Car 1950, 78 y o  female MRN: [id number]    Unit/Bed#: ICU 06 Encounter: [id number]    Primary Care Provider: Tila Gibson MD   Date and time admitted to hospital: 12/17/2019 12:27 PM        Heart failure with preserved ejection fraction [facility])  Assessment & Plan  Wt Readings from Last 3 Encounters:   12/19/19 124 kg (273 lb 13 oz)   09/12/19 115 kg (254 lb)   09/02/19 122 kg (270 lb)       Clinically resolved  Essential hypertension  Assessment & Plan  More than well controlled  Since diltiazem is being reacted amlodipine had been discontinued  Will also lower the lisinopril dose  * Paroxysmal atrial fibrillation (HCC)  Assessment & Plan  Recurrent  She did not feel any better after transesophageal cardioversion in the past   At this point will concentrate on rate control  Likely persistent at this point  Heart rate much better controlled  Will switch to long-acting diltiazem  Subjective:   Patient seen and examined  Dyspnea is improved as is edema  Summary comments:  Generally improved on effective rate slowing  Switching to long-acting diltiazem  Encourage ambulation and hopefully discharge within 24 hours  Telemetry/ECG/Cardiac testing:   Atrial fibrillation with a controlled rate today  Echocardiogram 08/26/2019:  Normal left ventricular systolic function  Vitals: Blood pressure 122/73, pulse 100, temperature (!) 97 1 °F (36 2 °C), temperature source Tympanic, resp   rate (!) 32, height 5' 2" (1 575 m), weight 124 kg (273 lb 13 oz), SpO2 91 % ,   Orthostatic Blood Pressures      Most Recent Value   Blood Pressure  122/73 filed at 12/19/2019 0850   Patient Position - Orthostatic VS  Sitting filed at 12/19/2019 0748      ,   Weight (last 2 days)     Date/Time   Weight    12/19/19 0600   124 (273 81)    12/18/19 0545   128 (282 19)    12/17/19 1645    127 3kg    Weight: 127 3kg at 12/17/19 1645    12/17/19 1638   127 (280 65)    12/17/19 1231   122 (580)              Physical Exam:    General:  Normal appearance in no distress  Eyes:  Anicteric  Oral mucosa:  Moist   Neck:  No JVD  Carotid upstrokes are brisk without bruits  No masses  Chest:  Clear to auscultation and percussion  Cardiac:  Irregularly irregular  No palpable PMI  Normal S1 and S2  No murmur gallop or rub  Abdomen:  Soft and nontender  No palpable organomegaly or aortic enlargement  Extremities:  1+ pretibial edema  trivial hand edema  Musculoskeletal:  Symmetric  Vascular:    Popliteal  pulses are intact bilaterally  Pedal pulses are intact  Neuro:  Grossly symmetric  Psych:  Alert and oriented x3        Medications:      Current Facility-Administered Medications:     acetaminophen (TYLENOL) tablet 650 mg, 650 mg, Oral, Q6H PRN, Philippe Gastelum MD    [facility]) tablet 5 mg, 5 mg, Oral, BID, Philippe Gastelum MD, 5 mg at 12/19/19 0850    atorvastatin (LIPITOR) tablet 40 mg, 40 mg, Oral, Daily With Melba Hylton MD, 40 mg at 12/18/19 1723    diltiazem (CARDIZEM CD) 24 hr capsule 300 mg, 300 mg, Oral, Once, Agusto Purdy MD  [facility] ON 12/20/2019] diltiazem (CARDIZEM CD) 24 hr capsule 360 mg, 360 mg, Oral, Daily, Agusto Purdy MD    diltiazem (CARDIZEM) 125 mg in sodium chloride 0 9 % 125 mL infusion, 1-15 mg/hr, Intravenous, Titrated, Philippe Gastelum MD, Last Rate: 15 mL/hr at 12/18/19 0837, 15 mg/hr at 12/18/19 0837    docusate sodium (COLACE) capsule 100 mg, 100 mg, Oral, BID, Philippe Gastelum MD, 100 mg at 12/17/19 1745    escitalopram (LEXAPRO) tablet 10 mg, 10 mg, Oral, Daily, Philippe Gastelum MD, 10 mg at 12/19/19 0849    furosemide (LASIX) tablet 20 mg, 20 mg, Oral, Daily, Bindu Bobby MD, 20 mg at 12/19/19 0849    [START ON 12/20/2019] lisinopril (ZESTRIL) tablet 20 mg, 20 mg, Oral, Daily, Agusto Purdy MD    melatonin tablet 9 mg, 9 mg, Oral, HS, Teresita De La Paz PA-C, 9 mg at 12/18/19 2148    metoprolol (LOPRESSOR) injection 5 mg, 5 mg, Intravenous, Q6H PRN, Zaki Barbosa PA-C, 5 mg at 12/18/19 0609    metoprolol tartrate (LOPRESSOR) tablet 50 mg, 50 mg, Oral, Q12H [address], Rao Velazquez MD, 50 mg at 12/19/19 0850    ondansetron (ZOFRAN) injection 4 mg, 4 mg, Intravenous, Q6H PRN, Maggie Enciso MD     Labs & Results:    Troponins:   Results from last 7 days   Lab Units 12/17/19  1244   TROPONIN I ng/mL <0 03      BNP:   Results from last 6 Months   Lab Units 12/17/19  1244 08/26/19  0918   BNP pg/mL 354* 499*       CBC with diff:   Results from last 7 days   Lab Units 12/18/19  0442 12/17/19  1244   WBC Thousand/uL 7 80 11 60*   HEMOGLOBIN g/dL 10 6* 12 4   HEMATOCRIT % 33 3* 39 7*   MCV fL 90 90   PLATELETS Thousands/uL 234 320     TSH:     CMP:   Results from last 7 days   Lab Units 12/18/19  0442 12/17/19  1244   POTASSIUM mmol/L 3 8 3 9   CHLORIDE mmol/L 108* 108*   CO2 mmol/L 26 23   BUN mg/dL 24 27*   CREATININE mg/dL 0 74 0 80   AST U/L 17 18   ALT U/L 17 22   EGFR ml/min/1 73sq m 82 75     Lipid Profile:     Coags:   Results from last 7 days   Lab Units 12/17/19  1244   INR  1 52*

## 2019-12-19 NOTE — PLAN OF CARE
Problem: CARDIOVASCULAR - ADULT  Goal: Maintains optimal cardiac output and hemodynamic stability  Description  INTERVENTIONS:  - Monitor I/O, vital signs and rhythm  - Monitor for S/S and trends of decreased cardiac output  - Assess quality of pulses, skin color and temperature  - Assess for signs of decreased coronary artery perfusion  - Instruct patient to report change in severity of symptoms   Outcome: Progressing  Goal: Absence of cardiac dysrhythmias or at baseline rhythm  Description  INTERVENTIONS:  - Continuous cardiac monitoring, vital signs, obtain 12 lead EKG if ordered  - Administer antiarrhythmic and heart rate control medications as ordered  - Monitor electrolytes and administer replacement therapy as ordered  Outcome: Progressing     Problem: RESPIRATORY - ADULT  Goal: Achieves optimal ventilation and oxygenation  Description  INTERVENTIONS:  - Assess for changes in respiratory status  - Assess for changes in mentation and behavior  - Position to facilitate oxygenation and minimize respiratory effort  - Oxygen administered by appropriate delivery if ordered  - Encourage broncho-pulmonary hygiene including cough, deep breathe, Incentive Spirometry  - Assess and instruct to report SOB or any respiratory difficulty  - Respiratory Therapy support as indicated   Outcome: Progressing     Problem: INFECTION - ADULT  Goal: Absence or prevention of progression during hospitalization  Description  INTERVENTIONS:  - Assess and monitor for signs and symptoms of infection  - Monitor lab/diagnostic results  - Monitor all insertion sites, i e  indwelling lines, tubes, and drains  - Administer medications as ordered  - Instruct and encourage patient and family to use good hand hygiene technique     Outcome: Progressing     Problem: SAFETY ADULT  Goal: Patient will remain free of falls  Description  INTERVENTIONS:  - Assess patient frequently for physical needs  -  Identify cognitive and physical deficits and behaviors that affect risk of falls  -  Randolph fall precautions as indicated by assessment   - Educate patient/family on patient safety including physical limitations  - Instruct patient to call for assistance with activity based on assessment  - Modify environment to reduce risk of injury  - Consider OT/PT consult to assist with strengthening/mobility  Outcome: Progressing     Problem: Potential for Falls  Goal: Patient will remain free of falls  Description  INTERVENTIONS:  - Assess patient frequently for physical needs  -  Identify cognitive and physical deficits and behaviors that affect risk of falls    -  Randolph fall precautions as indicated by assessment   - Educate patient/family on patient safety including physical limitations  - Instruct patient to call for assistance with activity based on assessment  - Modify environment to reduce risk of injury  - Consider OT/PT consult to assist with strengthening/mobility  Outcome: Progressing

## 2019-12-19 NOTE — ASSESSMENT & PLAN NOTE
· Rates controlled on oral medications currently  · Continue apixaban for anticoagulation purposes  · Of note, patient is status post a SUSIE and a cardioversion in August of 2019  · [address] [facility] Cardiology input

## 2019-12-19 NOTE — NURSING NOTE
OOB to chair  AM assessment completed  No chest discomfort, sob, lightheadedness or dizziness  Heartrate remains irregular; AFIB  PO cardizem and metoprolol given per scheduled MAR  Callbell in reach, safety maintained

## 2019-12-19 NOTE — ASSESSMENT & PLAN NOTE
Wt Readings from Last 3 Encounters:   12/19/19 124 kg (273 lb 13 oz)   09/12/19 115 kg (254 lb)   09/02/19 122 kg (270 lb)     · BNP is elevated at 354  · Chest x-ray is concerning for pulmonary vascular congestion and bilateral pleural effusions  · Patient is status post 80 mg of IV Lasix today in the ER  · On oral Lasix  · Monitor daily weight, input and output  · Will hold off on reordering a 2D echocardiogram since 1 was done a few months ago  · Defer to Cardiology for further management

## 2019-12-19 NOTE — PHYSICAL THERAPY NOTE
Physical Therapy Cancellation Note    PT order received  Chart review performed  At this time, PT evaluation cancelled per pt request 2/2 current lightheadedness  RN made aware  PT will follow and evaluate as appropriate      Zainab Cheema PT

## 2019-12-19 NOTE — ASSESSMENT & PLAN NOTE
Recurrent  She did not feel any better after transesophageal cardioversion in the past   At this point will concentrate on rate control  Likely persistent at this point  Heart rate much better controlled  Will switch to long-acting diltiazem

## 2019-12-19 NOTE — OCCUPATIONAL THERAPY NOTE
Approached pt for OT eval (after clearing with nursing)  Pt declined, stating  Being 'too dizzy'  Will attempt eval at a later time, when appropriate    Lety Luevano, OT

## 2019-12-20 VITALS
HEART RATE: 85 BPM | HEIGHT: 62 IN | RESPIRATION RATE: 18 BRPM | SYSTOLIC BLOOD PRESSURE: 116 MMHG | BODY MASS INDEX: 52.58 KG/M2 | WEIGHT: 285.72 LBS | OXYGEN SATURATION: 92 % | DIASTOLIC BLOOD PRESSURE: 75 MMHG | TEMPERATURE: 97.6 F

## 2019-12-20 PROBLEM — I48.19 PERSISTENT ATRIAL FIBRILLATION (HCC): Status: ACTIVE | Noted: 2019-08-26

## 2019-12-20 PROCEDURE — G8980 MOBILITY D/C STATUS: HCPCS

## 2019-12-20 PROCEDURE — 99231 SBSQ HOSP IP/OBS SF/LOW 25: CPT | Performed by: INTERNAL MEDICINE

## 2019-12-20 PROCEDURE — 97166 OT EVAL MOD COMPLEX 45 MIN: CPT

## 2019-12-20 PROCEDURE — 99239 HOSP IP/OBS DSCHRG MGMT >30: CPT | Performed by: INTERNAL MEDICINE

## 2019-12-20 PROCEDURE — 97163 PT EVAL HIGH COMPLEX 45 MIN: CPT

## 2019-12-20 PROCEDURE — G8987 SELF CARE CURRENT STATUS: HCPCS

## 2019-12-20 PROCEDURE — G8988 SELF CARE GOAL STATUS: HCPCS

## 2019-12-20 PROCEDURE — G8978 MOBILITY CURRENT STATUS: HCPCS

## 2019-12-20 PROCEDURE — G8979 MOBILITY GOAL STATUS: HCPCS

## 2019-12-20 RX ORDER — DILTIAZEM HYDROCHLORIDE 360 MG/1
360 CAPSULE, EXTENDED RELEASE ORAL DAILY
Qty: 30 CAPSULE | Refills: 0 | Status: SHIPPED | OUTPATIENT
Start: 2019-12-21

## 2019-12-20 RX ORDER — LISINOPRIL 5 MG/1
5 TABLET ORAL DAILY
Qty: 30 TABLET | Refills: 0 | Status: SHIPPED | OUTPATIENT
Start: 2019-12-20

## 2019-12-20 RX ORDER — FUROSEMIDE 20 MG/1
20 TABLET ORAL DAILY
Qty: 30 TABLET | Refills: 0 | Status: SHIPPED | OUTPATIENT
Start: 2019-12-21 | End: 2020-01-04 | Stop reason: HOSPADM

## 2019-12-20 RX ORDER — METOPROLOL TARTRATE 50 MG/1
50 TABLET, FILM COATED ORAL EVERY 12 HOURS SCHEDULED
Qty: 60 TABLET | Refills: 0 | Status: SHIPPED | OUTPATIENT
Start: 2019-12-20 | End: 2020-01-04 | Stop reason: HOSPADM

## 2019-12-20 RX ADMIN — FUROSEMIDE 20 MG: 20 TABLET ORAL at 08:32

## 2019-12-20 RX ADMIN — APIXABAN 5 MG: 5 TABLET, FILM COATED ORAL at 08:32

## 2019-12-20 RX ADMIN — ESCITALOPRAM 10 MG: 5 TABLET, FILM COATED ORAL at 08:32

## 2019-12-20 RX ADMIN — DILTIAZEM HYDROCHLORIDE 360 MG: 180 CAPSULE, COATED, EXTENDED RELEASE ORAL at 08:32

## 2019-12-20 RX ADMIN — METOPROLOL TARTRATE 50 MG: 50 TABLET, FILM COATED ORAL at 08:32

## 2019-12-20 RX ADMIN — LISINOPRIL 20 MG: 20 TABLET ORAL at 08:32

## 2019-12-20 NOTE — ASSESSMENT & PLAN NOTE
Recurrent  She did not feel any better after transesophageal cardioversion in the past   At this point will concentrate on rate control  Likely persistent at this point  Heart rate much better controlled  Doing well on current regimen

## 2019-12-20 NOTE — PHYSICAL THERAPY NOTE
Physical Therapy Evaluation     Patient's Name: Noelle Santacruz    Admitting Diagnosis  Shortness of breath [R06 02]  Acute exacerbation of CHF (congestive heart failure) ([facility] [address] ) [I50 9]  Atrial fibrillation with rapid ventricular response ([facility] ) [I48 91]    Problem List  Patient Active Problem List   Diagnosis    Acute respiratory failure with hypoxia ([facility] )    Sepsis due to pneumonia ([facility] )    Persistent atrial fibrillation    Essential hypertension    Morbid obesity ([facility] )    Major depressive disorder in full remission ([facility] )    Pneumonia    Mixed dyslipidemia    Heart failure with preserved ejection fraction ([facility] )    Leukemoid reaction       Past Medical History  Past Medical History:   Diagnosis Date    Anxiety     Atrial fibrillation     Hyperlipidemia     Hypertension     Obesity        Past Surgical History  Past Surgical History:   Procedure Laterality Date    CARDIOVERSION      TONSILLECTOMY            12/20/19 0917   Note Type   Note type Eval only   Pain Assessment   Pain Assessment No/denies pain   Pain Score No Pain   Home Living   Type of [address] One level;Performs ADLs on one level; Able to live on main level with bedroom/bathroom;Stairs to enter with rails  (2 AZAM)   Bathroom Shower/Tub Walk-in shower   Bathroom Toilet Standard   Bathroom Equipment   (no DME At baseline)   [address]  (no AD at baseline)   Additional Comments Pt lives in a [address] [place] [address] [facility] c 2 AZAM  Pt lives alone but will have her cousin and son staying c her x few days following d/c  Prior Function   Level of [place] Independent with ADLs and functional mobility   Lives With Alone   Receives Help From Family   ADL Assistance Independent   IADLs Independent   Falls in the last 6 months 1 to 4  (1 fall per chart review)   Vocational Full time employment   Restrictions/Precautions   Weight Bearing Precautions Per Order No   Other Precautions Chair Alarm;Telemetry; Fall Risk   General   Family/Caregiver Present No   Cognition   Overall Cognitive Status WFL   Arousal/Participation Alert   Attention Within functional limits   Orientation Level Oriented X4   Memory Within functional limits   Following Commands Follows all commands and directions without difficulty   Comments pt agreeable to PT session   RUE Assessment   RUE Assessment WFL   LUE Assessment   LUE Assessment WFL   RLE Assessment   RLE Assessment WFL   LLE Assessment   LLE Assessment WFL   Coordination   Movements are Fluid and Coordinated 1   Sensation WFL   Light Touch   RLE Light Touch Grossly intact   LLE Light Touch Grossly intact   Bed Mobility   Additional Comments Pt seated in recliner prior to eval and at end of eval    Transfers   Sit to Stand 6  Modified independent   Additional items Armrests   Stand to Sit 6  Modified independent   Additional items Bedrails   Ambulation/Elevation   Gait pattern Wide ANA CRISTINA; Decreased foot clearance; Short stride   Gait Assistance 6  Modified independent   Assistive Device None   Distance 40 ft   Stair Management Assistance Not tested   Balance   Static Sitting Good   Dynamic Sitting Fair +   Static Standing Fair +   Dynamic Standing Fair   Ambulatory Fair   Activity Tolerance   Activity Tolerance Treatment limited secondary to medical complications (Comment)  (+ lightheadedness upon standing/mobility, resolved)   Nurse Made Aware ANGELITA Bishop verbalized pt appropriate for therapy    Assessment   Prognosis Good   Assessment Pt is 78 y o  female seen for PT evaluation s/p admit to [address] on 12/17/2019 w/ Persistent atrial fibrillation  PT consulted to assess pt's functional mobility and d/c needs  Order placed for PT eval and tx, w/ up and OOB as tolerated order  Comorbidities affecting pt's physical performance at time of assessment include: morbid obesity, essential HTN, paroxysmal AFib   PTA, pt was independent w/ all functional mobility w/ no AD or DME, ambulates unrestricted distances and all terrain, has 2 AZAM, lives alone in 1 level home and works full time  Personal factors affecting pt at time of IE include: stairs to enter home and limited home support  Please find objective findings from PT assessment regarding body systems outlined above  The following objective measures performed on IE: Barthel Index: 65/100  Pt's clinical presentation is currently unstable/unpredictable seen in pt's presentation of ongoing medical management  From PT/mobility standpoint, pt appears to be functioning close to or at mobility baseline, therefore no further immediate skilled PT needs are warranted at this time  Pt currently performing all phases of functional mobility at independent level without need for AD  Recommend pt continue to mobilize ad lupis while here  Recommend pt return to previous living environment once medically cleared and with continued family support  Will sign off, D/C PT  Please reconsult if further immediate skilled PT needs are warranted     Barriers to Discharge None   Goals   Patient Goals "to go home"   PT Treatment Day 0   Plan   Treatment/Interventions Spoke to nursing   PT Frequency One time visit   Recommendation   Recommendation Home with family support;D/C PT   Equipment Recommended   (none at this time)   PT - OK to Discharge Yes  (when medically cleared)   Barthel Index   Feeding 10   Bathing 0   Grooming Score 5   Dressing Score 10   Bladder Score 10   Bowels Score 10   Toilet Use Score 10   Transfers (Bed/Chair) Score 10   Mobility (Level Surface) Score 0   Stairs Score 0  (DNT)   Barthel Index Score 65         Dany San, PT

## 2019-12-20 NOTE — OCCUPATIONAL THERAPY NOTE
Occupational Therapy Evaluation      Megan Howell    12/20/2019    Patient Active Problem List   Diagnosis    Acute respiratory failure with hypoxia ([facility] )    Sepsis due to pneumonia ([facility] )    Persistent atrial fibrillation    Essential hypertension    Morbid obesity ([facility] )    Major depressive disorder in full remission ([facility] )    Pneumonia    Mixed dyslipidemia    Heart failure with preserved ejection fraction (HCC)    Leukemoid reaction       Past Medical History:   Diagnosis Date    Anxiety     Atrial fibrillation     Hyperlipidemia     Hypertension     Obesity        Past Surgical History:   Procedure Laterality Date    CARDIOVERSION      TONSILLECTOMY          12/20/19 0916   Note Type   Note type Eval only   Restrictions/Precautions   Weight Bearing Precautions Per Order No   Other Precautions Chair Alarm; Fall Risk;Telemetry   Pain Assessment   Pain Assessment No/denies pain   Pain Score No Pain   Home Living   Type of [address] One level;Performs ADLs on one level; Able to live on main level with bedroom/bathroom;Stairs to enter with rails  (2 AZAM)   Bathroom Shower/Tub Walk-in shower   Bathroom Toilet Standard   Bathroom Equipment   (no DME)   [address]  (no AD used at baseline)   Prior Function   Level of [place] Independent with ADLs and functional mobility   Lives With Alone   ADL Assistance Independent   IADLs Independent   Falls in the last 6 months 1 to 4  (1 fall per chart review)   Vocational Full time employment   Lifestyle   Autonomy Patient reporting independent with ADLs/IADLs, lives alone in a one level house with 2 AZAM, and ambulates with no AD   Reciprocal Relationships Family is flying in for the holidays    ADL   Eating Assistance 6  Modified independent   Grooming Assistance 6  Modified Independent   UB Bathing Assistance 5  [address] 5 Supervision/Setup   LB Dressing Assistance 5  Supervision/Setup   Toileting Assistance  5  Supervision/Setup   Bed Mobility   Additional Comments Pt seated in recliner prior to eval and at end of eval    Transfers   Sit to Stand 5  Supervision   Additional items Assist x 1; Increased time required;Verbal cues   Stand to Sit 5  Supervision   Additional items Assist x 1; Increased time required;Verbal cues   Functional Mobility   Functional Mobility 5  Supervision   Balance   Static Sitting Good   Dynamic Sitting Fair +   Static Standing Fair +   Dynamic Standing Fair   Activity Tolerance   Activity Tolerance Treatment limited secondary to medical complications (Comment)  (+ lightheadedness upon standing/mobility )   Nurse Made Aware ANGELITA Licea verbalized pt appropriate for therapy    RUE Assessment   RUE Assessment WFL   LUE Assessment   LUE Assessment WFL   Hand Function   Gross Motor Coordination Functional   Fine Motor Coordination Functional   Cognition   Overall Cognitive Status WFL   Arousal/Participation Alert; Responsive; Cooperative   Attention Within functional limits   Orientation Level Oriented X4   Memory Within functional limits   Following Commands Follows all commands and directions without difficulty   Comments Pt agreeable to OT eval    Assessment   Limitation Decreased ADL status; Decreased endurance;Decreased self-care trans;Decreased high-level ADLs   Prognosis Good   Assessment Pt is a 78 y o  female who was admitted to [address] [place] [address] on 12/17/2019 with Persistent atrial fibrillation  At this time, patient is also affected by the comorbidities of: HTN, major depressive disorder, mixed dyslipidemia, heart failure, and leukemoid reaction  Additionally, patient is affected by the following personal factors:steps to enter environment, limited home support, difficulty performing ADLS and difficulty performing IADLS   Orders placed for OT evaluation/treatment with up and OOB as tolerated orders  Prior to admission, Patient reporting independent with ADLs/IADLs, lives alone in a one level house with 2 AZAM, and ambulates with no AD  Upon OT evaluation, patient requires supervision/set-up for UB ADLs and supervision/set-up for LB ADLs  Patient presents with functional use of BUEs, with intact prehension and fine motor coordination, and symmetrical muscle strength  Patient appears to be functioning at baseline, no further Acute OT needs identified at this time to warrant OT services  D/C OT and from OT standpoint, recommendation at time of d/c would be home with family support      Goals   Patient Goals to go home    Recommendation   OT Discharge Recommendation Home with family support   OT - OK to Discharge Yes  (Once medically cleared )   Barthel Index   Feeding 10   Bathing 0   Grooming Score 5   Dressing Score 10   Bladder Score 5   Bowels Score 10   Toilet Use Score 10   Transfers (Bed/Chair) Score 10   Mobility (Level Surface) Score 0   Stairs Score 0   Barthel Index Score 60     Vic Huff, OT

## 2019-12-20 NOTE — ASSESSMENT & PLAN NOTE
· Rates controlled on oral medications currently  · Continue apixaban for anticoagulation purposes  · Of note, patient is status post a SUSIE and a cardioversion in August of 2019  · [address] Cardiology input  · Plan to discharge home today with outpatient cardiology follow-up

## 2019-12-20 NOTE — PROGRESS NOTES
Progress Note - Mark Soto 1950, 78 y o  female MRN: [id number]    Unit/Bed#: -01 Encounter: [id number]    Primary Care Provider: Hodan Hess MD   Date and time admitted to hospital: 12/17/2019 12:27 PM        Heart failure with preserved ejection fraction [facility])  Assessment & Plan  Wt Readings from Last 3 Encounters:   12/20/19 130 kg (285 lb 11 5 oz)   09/12/19 115 kg (254 lb)   09/02/19 122 kg (270 lb)       Clinically resolved  No longer dyspneic though she still has some edema  * Persistent atrial fibrillation  Assessment & Plan  Recurrent  She did not feel any better after transesophageal cardioversion in the past   At this point will concentrate on rate control  Likely persistent at this point  Heart rate much better controlled  Doing well on current regimen  Essential hypertension  Assessment & Plan  More than well controlled  Since diltiazem is being reacted amlodipine had been discontinued  Will also lower the lisinopril dose further  Subjective:   Patient seen and examined  Feels ok  Summary comments: In general she is doing nicely  I am satisfied with her current regimen  She is scheduled to see us in early January to reassess heart rate and blood pressure  Telemetry/ECG/Cardiac testing:   Atrial fibrillation with a controlled response  Echocardiogram 08/26/2019:  Normal left ventricular systolic function  Vitals: Blood pressure 116/75, pulse 85, temperature 97 6 °F (36 4 °C), temperature source Temporal, resp   rate 18, height 5' 2" (1 575 m), weight 130 kg (285 lb 11 5 oz), SpO2 92 % ,   Orthostatic Blood Pressures      Most Recent Value   Blood Pressure  116/75 filed at 12/20/2019 0736   Patient Position - Orthostatic VS  Sitting filed at 12/20/2019 0736      ,   Weight (last 2 days) before discharge     Date/Time   Weight    12/20/19 0600   130 (285 72)    12/19/19 0600   124 (273 81)    12/18/19 0545   128 (282 19) Physical Exam:    General:  Normal appearance in no distress  Eyes:  Anicteric  Oral mucosa:  Moist   Neck:  No JVD  Carotid upstrokes are brisk without bruits  No masses  Chest:  Clear to auscultation and percussion  Cardiac:  Irregularly irregular  No palpable PMI  Normal S1 and S2  No murmur gallop or rub  Abdomen:  Soft and nontender  No palpable organomegaly or aortic enlargement  Extremities: Still with  1+ pretibial edema  Musculoskeletal:  Symmetric  Vascular:  Femoral pulses are brisk without bruits  Popliteal  pulses are intact bilaterally  Pedal pulses are intact  Neuro:  Grossly symmetric  Psych:  Alert and oriented x3          Medications:      Current Facility-Administered Medications:     acetaminophen (TYLENOL) tablet 650 mg, 650 mg, Oral, Q6H PRN, Rosario Baron PA-C    apixaban (ELIQUIS) tablet 5 mg, 5 mg, Oral, BID, Kengema Baron, PA-C, 5 mg at 12/20/19 5659    atorvastatin (LIPITOR) tablet 40 mg, 40 mg, Oral, Daily With Dinner, Rosario Baron, PA-C, 40 mg at 12/19/19 1737    diltiazem (CARDIZEM CD) 24 hr capsule 360 mg, 360 mg, Oral, Daily, Kengema Rothmanel, PA-C, 360 mg at 12/20/19 6698    docusate sodium (COLACE) capsule 100 mg, 100 mg, Oral, BID, Kengema Baron, PA-C, 100 mg at 12/17/19 1745    escitalopram (LEXAPRO) tablet 10 mg, 10 mg, Oral, Daily, Rosario Baron, PA-C, 10 mg at 12/20/19 9802    furosemide (LASIX) tablet 20 mg, 20 mg, Oral, Daily, Kengema Baron, PA-C, 20 mg at 12/20/19 9479    lisinopril (ZESTRIL) tablet 20 mg, 20 mg, Oral, Daily, Rosario Baron, PA-C, 20 mg at 12/20/19 9615    melatonin tablet 9 mg, 9 mg, Oral, HS, Kengema Baron, PA-C, 9 mg at 12/19/19 2207    metoprolol (LOPRESSOR) injection 5 mg, 5 mg, Intravenous, Q6H PRN, Rosario Baron, PA-C, 5 mg at 12/18/19 0609    metoprolol tartrate (LOPRESSOR) tablet 50 mg, 50 mg, Oral, Q12H Rosario SINGH PA-C, 50 mg at 12/20/19 0832    ondansetron (Hugo Wu) injection 4 mg, 4 mg, Intravenous, Q6H PRN, Tesha Corona PA-C    Current Outpatient Medications:     apixaban (ELIQUIS) 5 mg, Take 1 tablet (5 mg total) by mouth 2 (two) times a day, Disp: 60 tablet, Rfl: 1    atorvastatin (LIPITOR) 40 mg tablet, atorvastatin 40 mg tablet  take one tab by mouth once daily, Disp: , Rfl:     escitalopram (LEXAPRO) 20 mg tablet, Take by mouth Daily , Disp: , Rfl:     [START ON 12/21/2019] diltiazem (CARDIZEM CD) 360 MG 24 hr capsule, Take 1 capsule (360 mg total) by mouth daily, Disp: 30 capsule, Rfl: 0    [START ON 12/21/2019] furosemide (LASIX) 20 mg tablet, Take 1 tablet (20 mg total) by mouth daily, Disp: 30 tablet, Rfl: 0    lisinopril (ZESTRIL) 5 mg tablet, Take 1 tablet (5 mg total) by mouth daily, Disp: 30 tablet, Rfl: 0    metoprolol tartrate (LOPRESSOR) 50 mg tablet, Take 1 tablet (50 mg total) by mouth every 12 (twelve) hours, Disp: 60 tablet, Rfl: 0     Labs & Results:    Troponins:   Results from last 7 days   Lab Units 12/17/19  1244   TROPONIN I ng/mL <0 03      BNP:   Results from last 6 Months   Lab Units 12/17/19  1244 08/26/19  0918   BNP pg/mL 354* 499*       CBC with diff:   Results from last 7 days   Lab Units 12/18/19  0442 12/17/19  1244   WBC Thousand/uL 7 80 11 60*   HEMOGLOBIN g/dL 10 6* 12 4   HEMATOCRIT % 33 3* 39 7*   MCV fL 90 90   PLATELETS Thousands/uL 234 320     TSH:     CMP:   Results from last 7 days   Lab Units 12/18/19  0442 12/17/19  1244   POTASSIUM mmol/L 3 8 3 9   CHLORIDE mmol/L 108* 108*   CO2 mmol/L 26 23   BUN mg/dL 24 27*   CREATININE mg/dL 0 74 0 80   AST U/L 17 18   ALT U/L 17 22   EGFR ml/min/1 73sq m 82 75     Lipid Profile:     Coags:   Results from last 7 days   Lab Units 12/17/19  1244   INR  1 52*

## 2019-12-20 NOTE — ASSESSMENT & PLAN NOTE
· Blood pressures been on the lower side after starting on diltiazem and metoprolol  · Amlodipine discontinued  · I will decrease her lisinopril dose down to 5 mg  · Titrate antihypertensives as necessary

## 2019-12-20 NOTE — ASSESSMENT & PLAN NOTE
Wt Readings from Last 3 Encounters:   12/20/19 130 kg (285 lb 11 5 oz)   09/12/19 115 kg (254 lb)   09/02/19 122 kg (270 lb)     · BNP is elevated at 354  · Chest x-ray is concerning for pulmonary vascular congestion and bilateral pleural effusions  · Patient is status post 80 mg of IV Lasix today in the ER  · We will discharge on oral Lasix, patient has trace edema currently  · Follow-up with Cardiology as an outpatient

## 2019-12-20 NOTE — ASSESSMENT & PLAN NOTE
Wt Readings from Last 3 Encounters:   12/20/19 130 kg (285 lb 11 5 oz)   09/12/19 115 kg (254 lb)   09/02/19 122 kg (270 lb)       Clinically resolved  No longer dyspneic though she still has some edema

## 2019-12-20 NOTE — ASSESSMENT & PLAN NOTE
More than well controlled  Since diltiazem is being reacted amlodipine had been discontinued  Will also lower the lisinopril dose further

## 2019-12-20 NOTE — PLAN OF CARE
Problem: CARDIOVASCULAR - ADULT  Goal: Maintains optimal cardiac output and hemodynamic stability  Description  INTERVENTIONS:  - Monitor I/O, vital signs and rhythm  - Monitor for S/S and trends of decreased cardiac output  - Assess quality of pulses, skin color and temperature  - Assess for signs of decreased coronary artery perfusion  - Instruct patient to report change in severity of symptoms   12/20/2019 0849 by Miladis Fritz RN  Outcome: Progressing  12/20/2019 0849 by Miladis Fritz RN  Outcome: Progressing  12/20/2019 0849 by Miladis Fritz RN  Outcome: Progressing  Goal: Absence of cardiac dysrhythmias or at baseline rhythm  Description  INTERVENTIONS:  - Continuous cardiac monitoring, vital signs, obtain 12 lead EKG if ordered  - Administer antiarrhythmic and heart rate control medications as ordered  - Monitor electrolytes and administer replacement therapy as ordered  12/20/2019 0849 by Miladis Fritz RN  Outcome: Progressing  12/20/2019 0849 by Miladis Fritz RN  Outcome: Progressing  12/20/2019 0849 by Miladis Fritz RN  Outcome: Progressing     Problem: RESPIRATORY - ADULT  Goal: Achieves optimal ventilation and oxygenation  Description  INTERVENTIONS:  - Assess for changes in respiratory status  - Assess for changes in mentation and behavior  - Position to facilitate oxygenation and minimize respiratory effort  - Oxygen administered by appropriate delivery if ordered  - Encourage broncho-pulmonary hygiene including cough, deep breathe, Incentive Spirometry  - Assess and instruct to report SOB or any respiratory difficulty  - Respiratory Therapy support as indicated   12/20/2019 0849 by Miladis Fritz RN  Outcome: Progressing  12/20/2019 0849 by Miladis Fritz RN  Outcome: Progressing  12/20/2019 0849 by Miladis Fritz RN  Outcome: Progressing     Problem: PAIN - ADULT  Goal: Verbalizes/displays adequate comfort level or baseline comfort level  Description  Interventions:  - Encourage patient to monitor pain and request assistance  - Assess pain using appropriate pain scale  - Administer analgesics based on type and severity of pain and evaluate response  - Implement non-pharmacological measures as appropriate and evaluate response  - Consider cultural and social influences on pain and pain management  - Notify physician/advanced practitioner if interventions unsuccessful or patient reports new pain  12/20/2019 0849 by Lavern Lopez RN  Outcome: Progressing  12/20/2019 0849 by Lavern Lopez RN  Outcome: Progressing  12/20/2019 0849 by Lavern Lopez RN  Outcome: Progressing     Problem: INFECTION - ADULT  Goal: Absence or prevention of progression during hospitalization  Description  INTERVENTIONS:  - Assess and monitor for signs and symptoms of infection  - Monitor lab/diagnostic results  - Monitor all insertion sites, i e  indwelling lines, tubes, and drains  - Administer medications as ordered  - Instruct and encourage patient and family to use good hand hygiene technique     12/20/2019 0849 by Lavern Lopez RN  Outcome: Progressing  12/20/2019 0849 by Lavern Lopez RN  Outcome: Progressing  12/20/2019 0849 by Lavern Lopez RN  Outcome: Progressing     Problem: SAFETY ADULT  Goal: Patient will remain free of falls  Description  INTERVENTIONS:  - Assess patient frequently for physical needs  -  Identify cognitive and physical deficits and behaviors that affect risk of falls    -  [place] fall precautions as indicated by assessment   - Educate patient/family on patient safety including physical limitations  - Instruct patient to call for assistance with activity based on assessment  - Modify environment to reduce risk of injury  - Consider OT/PT consult to assist with strengthening/mobility  12/20/2019 0849 by Lavern Lopez RN  Outcome: Progressing  12/20/2019 0849 by Lavern Lopez RN  Outcome: Progressing  12/20/2019 0849 by Lavern Lopez RN  Outcome: Progressing     Problem: DISCHARGE PLANNING  Goal: Discharge to home or other facility with appropriate resources  Description  INTERVENTIONS:  - Identify barriers to discharge w/patient and caregiver  - Arrange for needed discharge resources and transportation as appropriate  - Identify discharge learning needs (meds, wound care, etc )  - Refer to Case Management Department for coordinating discharge planning if the patient needs post-hospital services based on physician/advanced practitioner order or complex needs related to functional status, cognitive ability, or social support system   12/20/2019 0849 by Sammy Portillo RN  Outcome: Progressing  12/20/2019 0849 by Sammy Portillo RN  Outcome: Progressing  12/20/2019 0849 by Sammy Portillo RN  Outcome: Progressing     Problem: Knowledge Deficit  Goal: Patient/family/caregiver demonstrates understanding of disease process, treatment plan, medications, and discharge instructions  Description  Complete learning assessment and assess knowledge base  Interventions:  - Provide teaching at level of understanding  - Provide teaching via preferred learning methods  12/20/2019 0849 by Sammy Portillo RN  Outcome: Progressing  12/20/2019 0849 by Sammy Portillo RN  Outcome: Progressing  12/20/2019 0849 by Sammy Portillo RN  Outcome: Progressing     Problem: Potential for Falls  Goal: Patient will remain free of falls  Description  INTERVENTIONS:  - Assess patient frequently for physical needs  -  Identify cognitive and physical deficits and behaviors that affect risk of falls    -  [place] fall precautions as indicated by assessment   - Educate patient/family on patient safety including physical limitations  - Instruct patient to call for assistance with activity based on assessment  - Modify environment to reduce risk of injury  - Consider OT/PT consult to assist with strengthening/mobility  12/20/2019 0849 by Sammy Portillo RN  Outcome: Progressing  12/20/2019 0849 by Sammy Portillo RN  Outcome: Progressing  12/20/2019 0849 by Sammy Portillo RN  Outcome: Progressing     Problem: Prexisting or High Potential for Compromised Skin Integrity  Goal: Skin integrity is maintained or improved  Description  INTERVENTIONS:  - Identify patients at risk for skin breakdown  - Assess and monitor skin integrity  - Assess and monitor nutrition and hydration status  - Monitor labs   - Assess for incontinence   - Turn and reposition patient  - Assist with mobility/ambulation  - Relieve pressure over bony prominences  - Avoid friction and shearing  - Provide appropriate hygiene as needed including keeping skin clean and dry  - Evaluate need for skin moisturizer/barrier cream  - Collaborate with interdisciplinary team   - Patient/family teaching  - Consider wound care consult   12/20/2019 0849 by Riaz Winchester RN  Outcome: Progressing  12/20/2019 0849 by Riaz Winchester RN  Outcome: Progressing  12/20/2019 0849 by Riaz Winchester RN  Outcome: Progressing     Problem: DISCHARGE PLANNING - CARE MANAGEMENT  Goal: Discharge to post-acute care or home with appropriate resources  Description  INTERVENTIONS:  - Conduct assessment to determine patient/family and health care team treatment goals, and need for post-acute services based on payer coverage, community resources, and patient preferences, and barriers to discharge  - Address psychosocial, clinical, and financial barriers to discharge as identified in assessment in conjunction with the patient/family and health care team  - Arrange appropriate level of post-acute services according to patient's   needs and preference and payer coverage in collaboration with the physician and health care team  - Communicate with and update the patient/family, physician, and health care team regarding progress on the discharge plan  - Arrange appropriate transportation to post-acute venues  Discharge to home  Family will transport     12/20/2019 0849 by Riaz Winchester RN  Outcome: Progressing  12/20/2019 0849 by Riaz Winchester RN  Outcome: Progressing  12/20/2019 0849 by Na An RN  Outcome: Progressing

## 2019-12-20 NOTE — DISCHARGE SUMMARY
Discharge- Mark Soto 1950, 78 y o  female MRN: [id number]    Unit/Bed#: -01 Encounter: [id number]    Primary Care Provider: Hodan Hess MD   Date and time admitted to hospital: 12/17/2019 12:27 PM        Heart failure with preserved ejection fraction [facility])  Assessment & Plan  Wt Readings from Last 3 Encounters:   12/20/19 130 kg (285 lb 11 5 oz)   09/12/19 115 kg (254 lb)   09/02/19 122 kg (270 lb)     · BNP is elevated at 354  · Chest x-ray is concerning for pulmonary vascular congestion and bilateral pleural effusions  · Patient is status post 80 mg of IV Lasix today in the ER  · We will discharge on oral Lasix, patient has trace edema currently  · Follow-up with Cardiology as an outpatient        Morbid obesity ([facility] )  Assessment & Plan  · BMI is 51  · Dietary, lifestyle, and weight loss modification counseling was provided    Essential hypertension  Assessment & Plan  · Blood pressures been on the lower side after starting on diltiazem and metoprolol  · Amlodipine discontinued  · I will decrease her lisinopril dose down to 5 mg  · Titrate antihypertensives as necessary    * Paroxysmal atrial fibrillation (HCC)  Assessment & Plan  · Rates controlled on oral medications currently  · Continue apixaban for anticoagulation purposes  · Of note, patient is status post a SUSIE and a cardioversion in August of 2019  · [address] Cardiology input  · Plan to discharge home today with outpatient cardiology follow-up          Discharging Physician / Practitioner: Doria Boast, MD  PCP: Hodan Hess MD  Admission Date:   Admission Orders (From admission, onward)     Ordered        12/17/19 1445  Inpatient Admission (expected length of stay for this patient Order details is greater than two midnights)  Once                   Discharge Date: 12/20/19    Resolved Problems  Date Reviewed: 12/19/2019    None          Consultations During Hospital Stay:  · cardiology    Procedures Performed: · n/a    Significant Findings / Test Results:   · n/a    Incidental Findings:   · n/a     Test Results Pending at Discharge (will require follow up):   · n/a     Outpatient Tests Requested:  · Cbc, cmp in 1 week    Complications:     none    Reason for Admission:  Shortness of breath    Hospital Course:     Mary Stroud is a 78 y o  female patient who originally presented to the hospital on 12/17/2019 due to shortness of breath and found to be in atrial fibrillation with rapid ventricular rate  She was admitted to the intensive care unit treated with a diltiazem drip  She was subsequently transitioned to oral medications  She was noted to be modestly volume overloaded, was diuresed with IV Lasix and transitioned to oral Lasix  As her heart rate appears to be well controlled, she has been deemed stable for discharge to home with outpatient cardiology follow-up  Of note, as she has been started on diltiazem and metoprolol, her other antihypertensives have been adjusted, amlodipine was discontinued and her ACE-inhibitor was decreased  Please see above list of diagnoses and related plan for additional information  Condition at Discharge: fair     Discharge Day Visit / Exam:     Subjective:  Patient seen examined  No acute events overnight  Feels well without complaints  Was a bit lightheaded yesterday but otherwise well  Vitals: Blood Pressure: 116/75 (12/20/19 0736)  Pulse: 85 (12/20/19 0736)  Temperature: 97 6 °F (36 4 °C) (12/20/19 0736)  Temp Source: Temporal (12/20/19 0736)  Respirations: 18 (12/20/19 0736)  Height: 5' 2" (157 5 cm) (12/17/19 1645)  Weight - Scale: 130 kg (285 lb 11 5 oz) (12/20/19 0600)  SpO2: 92 % (12/20/19 0736)  Exam:   Physical Exam   Constitutional: She is oriented to person, place, and time  She appears well-developed and well-nourished  HENT:   Head: Normocephalic and atraumatic  Eyes: Pupils are equal, round, and reactive to light   EOM are normal    Neck: Normal range of motion  Neck supple  Cardiovascular: Normal rate  Irregularly irregular   Pulmonary/Chest: Effort normal and breath sounds normal    Abdominal:   Obese   Musculoskeletal: Normal range of motion  She exhibits no edema  Neurological: She is alert and oriented to person, place, and time  Skin: Skin is warm  Capillary refill takes less than 2 seconds  Psychiatric: She has a normal mood and affect  Her behavior is normal  Thought content normal    Nursing note and vitals reviewed  Discussion with Family: n/a    Discharge instructions/Information to patient and family:   See after visit summary for information provided to patient and family  Provisions for Follow-Up Care:  See after visit summary for information related to follow-up care and any pertinent home health orders  Disposition:     Home    For Discharges to [address] [facility]:   · Not Applicable to this Patient - Not Applicable to this Patient    Planned Readmission:    n/a     Discharge Statement:  I spent 37 minutes discharging the patient  This time was spent on the day of discharge  I had direct contact with the patient on the day of discharge  Greater than 50% of the total time was spent examining patient, answering all patient questions, arranging and discussing plan of care with patient as well as directly providing post-discharge instructions  Additional time then spent on discharge activities  Discharge Medications:  See after visit summary for reconciled discharge medications provided to patient and family        ** Please Note: This note has been constructed using a voice recognition system **

## 2019-12-31 ENCOUNTER — APPOINTMENT (EMERGENCY)
Dept: RADIOLOGY | Facility: HOSPITAL | Age: 69
DRG: 292 | End: 2019-12-31
Payer: COMMERCIAL

## 2019-12-31 ENCOUNTER — HOSPITAL ENCOUNTER (INPATIENT)
Facility: HOSPITAL | Age: 69
LOS: 4 days | Discharge: HOME | DRG: 292 | End: 2020-01-04
Attending: EMERGENCY MEDICINE | Admitting: HOSPITALIST
Payer: COMMERCIAL

## 2019-12-31 DIAGNOSIS — I50.33 ACUTE ON CHRONIC HEART FAILURE WITH PRESERVED EJECTION FRACTION (HCC): ICD-10-CM

## 2019-12-31 DIAGNOSIS — I48.19 PERSISTENT ATRIAL FIBRILLATION (HCC): Chronic | ICD-10-CM

## 2019-12-31 DIAGNOSIS — I50.9 CHF (CONGESTIVE HEART FAILURE) (HCC): Primary | ICD-10-CM

## 2019-12-31 DIAGNOSIS — I48.91 ATRIAL FIBRILLATION (HCC): ICD-10-CM

## 2019-12-31 DIAGNOSIS — R06.02 SOB (SHORTNESS OF BREATH): ICD-10-CM

## 2019-12-31 PROBLEM — R73.9 HYPERGLYCEMIA: Status: ACTIVE | Noted: 2019-12-31

## 2019-12-31 PROBLEM — J96.01 ACUTE RESPIRATORY FAILURE WITH HYPOXIA (HCC): Status: RESOLVED | Noted: 2019-08-26 | Resolved: 2019-12-31

## 2019-12-31 PROBLEM — F32.5 MAJOR DEPRESSIVE DISORDER IN FULL REMISSION (HCC): Chronic | Status: ACTIVE | Noted: 2019-08-27

## 2019-12-31 PROBLEM — I10 ESSENTIAL HYPERTENSION: Chronic | Status: ACTIVE | Noted: 2019-08-26

## 2019-12-31 PROBLEM — E78.2 MIXED DYSLIPIDEMIA: Chronic | Status: ACTIVE | Noted: 2019-12-17

## 2019-12-31 PROBLEM — J18.9 SEPSIS DUE TO PNEUMONIA (HCC): Status: RESOLVED | Noted: 2019-08-26 | Resolved: 2019-12-31

## 2019-12-31 PROBLEM — E66.01 MORBID OBESITY (HCC): Chronic | Status: ACTIVE | Noted: 2019-08-26

## 2019-12-31 PROBLEM — J18.9 PNEUMONIA: Status: RESOLVED | Noted: 2019-08-30 | Resolved: 2019-12-31

## 2019-12-31 PROBLEM — A41.9 SEPSIS DUE TO PNEUMONIA (HCC): Status: RESOLVED | Noted: 2019-08-26 | Resolved: 2019-12-31

## 2019-12-31 LAB
ANION GAP SERPL CALCULATED.3IONS-SCNC: 6 MMOL/L (ref 4–13)
BUN SERPL-MCNC: 21 MG/DL (ref 7–25)
CALCIUM SERPL-MCNC: 9.1 MG/DL (ref 8.6–10.5)
CHLORIDE SERPL-SCNC: 104 MMOL/L (ref 98–107)
CK SERPL-CCNC: 61 U/L (ref 30–192)
CO2 SERPL-SCNC: 30 MMOL/L (ref 21–31)
CREAT SERPL-MCNC: 0.71 MG/DL (ref 0.6–1.2)
ERYTHROCYTE [DISTWIDTH] IN BLOOD BY AUTOMATED COUNT: 15 % (ref 11.5–14.5)
GFR SERPL CREATININE-BSD FRML MDRD: 87 ML/MIN/1.73SQ M
GLUCOSE SERPL-MCNC: 125 MG/DL (ref 65–99)
HCT VFR BLD AUTO: 37.9 % (ref 42–47)
HGB BLD-MCNC: 12 G/DL (ref 12–16)
MCH RBC QN AUTO: 28 PG (ref 26–34)
MCHC RBC AUTO-ENTMCNC: 31.8 G/DL (ref 31–37)
MCV RBC AUTO: 88 FL (ref 81–99)
PLATELET # BLD AUTO: 254 THOUSANDS/UL (ref 149–390)
PMV BLD AUTO: 7.4 FL (ref 8.6–11.7)
POTASSIUM SERPL-SCNC: 3.5 MMOL/L (ref 3.5–5.5)
RBC # BLD AUTO: 4.3 MILLION/UL (ref 3.9–5.2)
SODIUM SERPL-SCNC: 140 MMOL/L (ref 134–143)
TROPONIN I SERPL-MCNC: <0.03 NG/ML
WBC # BLD AUTO: 13.8 THOUSAND/UL (ref 4.8–10.8)

## 2019-12-31 PROCEDURE — 36415 COLL VENOUS BLD VENIPUNCTURE: CPT | Performed by: NURSE PRACTITIONER

## 2019-12-31 PROCEDURE — 71046 X-RAY EXAM CHEST 2 VIEWS: CPT

## 2019-12-31 PROCEDURE — 85027 COMPLETE CBC AUTOMATED: CPT | Performed by: NURSE PRACTITIONER

## 2019-12-31 PROCEDURE — 94640 AIRWAY INHALATION TREATMENT: CPT

## 2019-12-31 PROCEDURE — 99223 1ST HOSP IP/OBS HIGH 75: CPT | Performed by: PHYSICIAN ASSISTANT

## 2019-12-31 PROCEDURE — 99285 EMERGENCY DEPT VISIT HI MDM: CPT | Performed by: NURSE PRACTITIONER

## 2019-12-31 PROCEDURE — 99285 EMERGENCY DEPT VISIT HI MDM: CPT

## 2019-12-31 PROCEDURE — 84484 ASSAY OF TROPONIN QUANT: CPT | Performed by: NURSE PRACTITIONER

## 2019-12-31 PROCEDURE — 93005 ELECTROCARDIOGRAM TRACING: CPT

## 2019-12-31 PROCEDURE — 80048 BASIC METABOLIC PNL TOTAL CA: CPT | Performed by: NURSE PRACTITIONER

## 2019-12-31 PROCEDURE — 82550 ASSAY OF CK (CPK): CPT | Performed by: NURSE PRACTITIONER

## 2019-12-31 PROCEDURE — 94668 MNPJ CHEST WALL SBSQ: CPT

## 2019-12-31 PROCEDURE — 94760 N-INVAS EAR/PLS OXIMETRY 1: CPT

## 2019-12-31 RX ORDER — FUROSEMIDE 10 MG/ML
40 INJECTION INTRAMUSCULAR; INTRAVENOUS 2 TIMES DAILY
Status: DISCONTINUED | OUTPATIENT
Start: 2019-12-31 | End: 2020-01-03

## 2019-12-31 RX ORDER — METOPROLOL TARTRATE 5 MG/5ML
5 INJECTION INTRAVENOUS EVERY 6 HOURS PRN
Status: DISCONTINUED | OUTPATIENT
Start: 2019-12-31 | End: 2020-01-04 | Stop reason: HOSPADM

## 2019-12-31 RX ORDER — HYDROCODONE POLISTIREX AND CHLORPHENIRAMINE POLISTIREX 10; 8 MG/5ML; MG/5ML
5 SUSPENSION, EXTENDED RELEASE ORAL EVERY 12 HOURS PRN
Status: DISCONTINUED | OUTPATIENT
Start: 2019-12-31 | End: 2020-01-04 | Stop reason: HOSPADM

## 2019-12-31 RX ORDER — ALBUTEROL SULFATE 2.5 MG/3ML
2.5 SOLUTION RESPIRATORY (INHALATION) EVERY 4 HOURS PRN
Status: DISCONTINUED | OUTPATIENT
Start: 2019-12-31 | End: 2020-01-01

## 2019-12-31 RX ORDER — LEVALBUTEROL 1.25 MG/.5ML
1.25 SOLUTION, CONCENTRATE RESPIRATORY (INHALATION)
Status: DISCONTINUED | OUTPATIENT
Start: 2019-12-31 | End: 2020-01-01

## 2019-12-31 RX ORDER — METHYLPREDNISOLONE SODIUM SUCCINATE 125 MG/2ML
60 INJECTION, POWDER, LYOPHILIZED, FOR SOLUTION INTRAMUSCULAR; INTRAVENOUS ONCE
Status: COMPLETED | OUTPATIENT
Start: 2019-12-31 | End: 2019-12-31

## 2019-12-31 RX ORDER — HYDROCODONE POLISTIREX AND CHLORPHENIRAMINE POLISTIREX 10; 8 MG/5ML; MG/5ML
5 SUSPENSION, EXTENDED RELEASE ORAL ONCE
Status: COMPLETED | OUTPATIENT
Start: 2019-12-31 | End: 2019-12-31

## 2019-12-31 RX ORDER — ONDANSETRON 2 MG/ML
4 INJECTION INTRAMUSCULAR; INTRAVENOUS EVERY 6 HOURS PRN
Status: DISCONTINUED | OUTPATIENT
Start: 2019-12-31 | End: 2020-01-04 | Stop reason: HOSPADM

## 2019-12-31 RX ORDER — DILTIAZEM HYDROCHLORIDE 180 MG/1
360 CAPSULE, COATED, EXTENDED RELEASE ORAL DAILY
Status: DISCONTINUED | OUTPATIENT
Start: 2020-01-01 | End: 2020-01-04 | Stop reason: HOSPADM

## 2019-12-31 RX ORDER — FUROSEMIDE 40 MG/1
20 TABLET ORAL ONCE
Status: COMPLETED | OUTPATIENT
Start: 2019-12-31 | End: 2019-12-31

## 2019-12-31 RX ORDER — LISINOPRIL 5 MG/1
5 TABLET ORAL DAILY
Status: DISCONTINUED | OUTPATIENT
Start: 2020-01-01 | End: 2020-01-04 | Stop reason: HOSPADM

## 2019-12-31 RX ORDER — SODIUM CHLORIDE FOR INHALATION 0.9 %
3 VIAL, NEBULIZER (ML) INHALATION
Status: DISCONTINUED | OUTPATIENT
Start: 2019-12-31 | End: 2020-01-01

## 2019-12-31 RX ORDER — ESCITALOPRAM OXALATE 10 MG/1
20 TABLET ORAL DAILY
Status: DISCONTINUED | OUTPATIENT
Start: 2020-01-01 | End: 2020-01-04 | Stop reason: HOSPADM

## 2019-12-31 RX ORDER — METOPROLOL TARTRATE 50 MG/1
50 TABLET, FILM COATED ORAL EVERY 12 HOURS SCHEDULED
Status: DISCONTINUED | OUTPATIENT
Start: 2019-12-31 | End: 2020-01-02

## 2019-12-31 RX ORDER — ACETAMINOPHEN 325 MG/1
650 TABLET ORAL EVERY 4 HOURS PRN
Status: DISCONTINUED | OUTPATIENT
Start: 2019-12-31 | End: 2020-01-04 | Stop reason: HOSPADM

## 2019-12-31 RX ORDER — ATORVASTATIN CALCIUM 40 MG/1
40 TABLET, FILM COATED ORAL
Status: DISCONTINUED | OUTPATIENT
Start: 2019-12-31 | End: 2020-01-04 | Stop reason: HOSPADM

## 2019-12-31 RX ADMIN — LEVALBUTEROL HYDROCHLORIDE 1.25 MG: 1.25 SOLUTION, CONCENTRATE RESPIRATORY (INHALATION) at 20:02

## 2019-12-31 RX ADMIN — FUROSEMIDE 20 MG: 40 TABLET ORAL at 10:51

## 2019-12-31 RX ADMIN — APIXABAN 5 MG: 5 TABLET, FILM COATED ORAL at 17:40

## 2019-12-31 RX ADMIN — FUROSEMIDE 40 MG: 10 INJECTION, SOLUTION INTRAMUSCULAR; INTRAVENOUS at 17:40

## 2019-12-31 RX ADMIN — METOPROLOL TARTRATE 5 MG: 5 INJECTION, SOLUTION INTRAVENOUS at 18:03

## 2019-12-31 RX ADMIN — METHYLPREDNISOLONE SODIUM SUCCINATE 60 MG: 125 INJECTION, POWDER, FOR SOLUTION INTRAMUSCULAR; INTRAVENOUS at 14:42

## 2019-12-31 RX ADMIN — METOPROLOL TARTRATE 50 MG: 25 TABLET ORAL at 12:44

## 2019-12-31 RX ADMIN — HYDROCODONE POLISTIREX AND CHLORPHENIRAMINE POLISTIREX 5 ML: 10; 8 SUSPENSION, EXTENDED RELEASE ORAL at 14:40

## 2019-12-31 RX ADMIN — LEVALBUTEROL HYDROCHLORIDE 1.25 MG: 1.25 SOLUTION, CONCENTRATE RESPIRATORY (INHALATION) at 14:56

## 2019-12-31 RX ADMIN — FUROSEMIDE 40 MG: 10 INJECTION, SOLUTION INTRAMUSCULAR; INTRAVENOUS at 14:41

## 2019-12-31 RX ADMIN — ISODIUM CHLORIDE 3 ML: 0.03 SOLUTION RESPIRATORY (INHALATION) at 20:02

## 2019-12-31 RX ADMIN — ATORVASTATIN CALCIUM 40 MG: 40 TABLET, FILM COATED ORAL at 17:40

## 2019-12-31 RX ADMIN — METOPROLOL TARTRATE 50 MG: 50 TABLET, FILM COATED ORAL at 20:19

## 2019-12-31 RX ADMIN — ISODIUM CHLORIDE 3 ML: 0.03 SOLUTION RESPIRATORY (INHALATION) at 15:09

## 2019-12-31 NOTE — ASSESSMENT & PLAN NOTE
· Possibly reactive monitor labs in the a m    · Check a procalcitonin level  · Patient may have a bronchitis with reactive airway she does have some wheezing hold on antibiotics for now

## 2019-12-31 NOTE — RESPIRATORY THERAPY NOTE
RT Protocol Note  Jimmy Rangel 78 y o  female MRN: [id number]  Unit/Bed#: -01 Encounter: [id number]    Assessment    Principal Problem:    Acute on chronic heart failure with preserved ejection fraction (HCC)  Active Problems:    Persistent atrial fibrillation    Essential hypertension    Morbid obesity ([facility] )    Major depressive disorder in full remission ([facility] )    Mixed dyslipidemia    Leukemoid reaction    Hyperglycemia      Home Pulmonary Medications:  na       Past Medical History:   Diagnosis Date    Anxiety     Atrial fibrillation     Hyperlipidemia     Hypertension     Obesity      Social History     Socioeconomic History    Marital status:       Spouse name: None    Number of children: None    Years of education: None    Highest education level: None   Occupational History    None   Social Needs    Financial resource strain: None    Food insecurity:     Worry: None     Inability: None    Transportation needs:     Medical: None     Non-medical: None   Tobacco Use    Smoking status: Never Smoker    Smokeless tobacco: Never Used   Substance and Sexual Activity    Alcohol use: Never     Frequency: Never     Comment: social    Drug use: Never    Sexual activity: None   Lifestyle    Physical activity:     Days per week: None     Minutes per session: None    Stress: None   Relationships    Social connections:     Talks on phone: None     Gets together: None     Attends [employer] service: None     Active member of club or organization: None     Attends meetings of clubs or organizations: None     Relationship status: None    Intimate partner violence:     Fear of current or ex partner: None     Emotionally abused: None     Physically abused: None     Forced sexual activity: None   Other Topics Concern    None   Social History Narrative    None       Subjective         Objective    Physical Exam:   Assessment Type: Assess only  General Appearance: Awake, Alert  Respiratory Pattern: Tachypneic  Chest Assessment: Chest expansion symmetrical    Vitals:  Blood pressure 148/65, pulse (!) 132, temperature 98 °F (36 7 °C), temperature source Temporal, resp  rate (!) 26, weight 124 kg (273 lb), SpO2 96 %  Imaging and other studies: I have personally reviewed pertinent reports              Plan    Respiratory Plan: Mild Distress pathway  Airway Clearance Plan: Flutter

## 2019-12-31 NOTE — H&P
H&P- Stephanie Mcneill 1950, 78 y o  female MRN: [id number]    Unit/Bed#: -01 Encounter: [id number]    Primary Care Provider: Michael Charles MD   Date and time admitted to hospital: 12/31/2019  9:24 AM        * Acute on chronic heart failure with preserved ejection fraction [facility])  Assessment & Plan  Wt Readings from Last 3 Encounters:   12/31/19 124 kg (273 lb)   12/20/19 130 kg (285 lb 11 5 oz)   09/12/19 115 kg (254 lb)     Continue diuresis  Continue rate control on medications  Cardiology input      Hyperglycemia  Assessment & Plan  · Check A1c with AM labs and fasting glucose    Leukemoid reaction  Assessment & Plan  · Possibly reactive monitor labs in the a m  · Check a procalcitonin level  · Patient may have a bronchitis with reactive airway she does have some wheezing hold on antibiotics for now    Mixed dyslipidemia  Assessment & Plan  · Continue statin    Major depressive disorder in full remission ([facility] [address] )  Assessment & Plan  · Continue home medications    Morbid obesity ([facility] )  Assessment & Plan  · BMI calculated 49  · Weight loss recommended  · Follow-up dietitian as an outpatient    Essential hypertension  Assessment & Plan  · Continue meds and monitor    Persistent atrial fibrillation  Assessment & Plan  · Patient was seen by Cardiology approximately 2 weeks ago no reviewed  Patient reported not feeling any better after transesophageal cardioversion  Plan is to continue concentrating on rate control and continue anticoagulation  · Heart rate was in the 120s will request Cardiology consult    VTE Prophylaxis: Apixaban (Eliquis)  Code Status: full code  POLST: There is no POLST form on file for this patient (pre-hospital)  Discussion with patient    Anticipated Length of Stay:  Patient will be admitted on an Inpatient basis with an anticipated length of stay of  > 2 midnights     Justification for Hospital Stay: IV lasix, specialist input    Chief Complaint:   Shortness of breath    History of Present Illness:    Traci Huizar is a 78 y o  female who presents with shortness of breath  Patient with past medical history of hypertension, hyperlipidemia, morbid obesity, anxiety depression, atrial fibrillation status post transesophageal cardioversion currently on anticoagulation presents to the emergency room secondary to increasing shortness breath over 2 days she also noted having yellow sputum and progress greenish color sputum  In the emergency room she was tachycardic in the 130s  Reports hard to breath  Reports bloating/swollen in abdomen, +weight gain approx 10 pounds  She was given Lasix 20 mg p  O  And metoprolol 50 mg p o  in the emergency room    Review of Systems:  Review of Systems   Constitutional: Positive for fatigue  Negative for chills and fever  HENT: Negative for postnasal drip, rhinorrhea, sore throat and trouble swallowing  Eyes: Negative for discharge and redness  Respiratory: Positive for cough, shortness of breath and wheezing  Cardiovascular: Positive for leg swelling  Negative for chest pain and palpitations  Gastrointestinal: Negative for abdominal pain, diarrhea, nausea and vomiting  Genitourinary: Negative for dysuria and hematuria  Musculoskeletal: Negative for back pain and neck pain  Skin: Negative for rash and wound  Neurological: Positive for weakness and light-headedness  Negative for dizziness and headaches  Psychiatric/Behavioral: Negative for agitation and confusion  Past Medical and Surgical History:   Past Medical History:   Diagnosis Date    Anxiety     Atrial fibrillation     Hyperlipidemia     Hypertension     Obesity        Past Surgical History:   Procedure Laterality Date    CARDIOVERSION      TONSILLECTOMY         Meds/Allergies:  Prior to Admission medications    Medication Sig Start Date End Date Taking?  Authorizing Provider   apixaban (ELIQUIS) 5 mg Take 1 tablet (5 mg total) by mouth 2 (two) times a day 8/31/19 Alvin Bonner DO   atorvastatin (LIPITOR) 40 mg tablet atorvastatin 40 mg tablet   take one tab by mouth once daily    Historical Provider, MD   diltiazem (CARDIZEM CD) 360 MG 24 hr capsule Take 1 capsule (360 mg total) by mouth daily 12/21/19   Shanti Bradley MD   escitalopram (LEXAPRO) 20 mg tablet Take by mouth Daily     Historical Provider, MD   furosemide (LASIX) 20 mg tablet Take 1 tablet (20 mg total) by mouth daily 12/21/19   Shanti Bradley MD   lisinopril (ZESTRIL) 5 mg tablet Take 1 tablet (5 mg total) by mouth daily 12/20/19   Shanti Bradley MD   metoprolol tartrate (LOPRESSOR) 50 mg tablet Take 1 tablet (50 mg total) by mouth every 12 (twelve) hours 12/20/19   Shanti Bradley MD     I have reviewed home medications with patient personally  Allergies: Allergies   Allergen Reactions    Other Itching     lobster    Penicillins Rash       Social History:  Marital Status:    Occupation:  Retired  Patient Pre-hospital Living Situation:  Home  Patient Pre-hospital Level of Mobility:  Mobile  Patient Pre-hospital Diet Restrictions:  None  Substance Use History:     Social History     Substance and Sexual Activity   Alcohol Use Never    Frequency: Never    Comment: social     Social History     Tobacco Use   Smoking Status Never Smoker   Smokeless Tobacco Never Used     Social History     Substance and Sexual Activity   Drug Use Never       Family History:  I have reviewed the patients family history    Physical Exam:   Vitals:   Blood Pressure: 148/65 (12/31/19 1245)  Pulse: (!) 132 (12/31/19 1245)  Temperature: 98 °F (36 7 °C) (12/31/19 0924)  Temp Source: Temporal (12/31/19 0924)  Respirations: (!) 26 (12/31/19 1245)  Weight - Scale: 124 kg (273 lb) (12/31/19 0924)  SpO2: 96 % (12/31/19 1245)    Physical Exam   Constitutional: She is oriented to person, place, and time  She appears well-developed and well-nourished     Pleasant  female, morbidly obese with occasional cough   HENT:   Head: Normocephalic and atraumatic  Eyes: Conjunctivae and EOM are normal  Right eye exhibits no discharge  Left eye exhibits no discharge  Glasses in place   Neck: Normal range of motion  No tracheal deviation present  Cardiovascular: Normal heart sounds  An irregularly irregular rhythm present  Tachycardia present  Exam reveals no gallop and no friction rub  No murmur heard  Pulmonary/Chest: Effort normal  No respiratory distress  She has wheezes  She has rhonchi  She has no rales  Abdominal: Soft  Bowel sounds are normal  She exhibits no distension and no mass  There is no tenderness  There is no guarding  obese   Musculoskeletal: Normal range of motion  She exhibits edema (Non pitting)  She exhibits no tenderness or deformity  Lower extremity in vena dynes   Neurological: She is alert and oriented to person, place, and time  Skin: Skin is warm and dry  No rash noted  No erythema  No pallor  Psychiatric: She has a normal mood and affect  Her behavior is normal  Judgment and thought content normal    Nursing note and vitals reviewed  Additional Data:   Lab Results: I have personally reviewed pertinent reports  Results from last 7 days   Lab Units 12/31/19  0959   WBC Thousand/uL 13 80*   HEMOGLOBIN g/dL 12 0   HEMATOCRIT % 37 9*   PLATELETS Thousands/uL 254     Results from last 7 days   Lab Units 12/31/19  0959   POTASSIUM mmol/L 3 5   CHLORIDE mmol/L 104   CO2 mmol/L 30   BUN mg/dL 21   CREATININE mg/dL 0 71   CALCIUM mg/dL 9 1     Imaging: I have personally reviewed pertinent reports  XR chest pa & lateral   Final Result by Liliana Peters MD (12/31 1592)      1  Diffuse interstitial opacities, increased from prior  Favor pulmonary edema  2   Small bilateral pleural effusions, also increased,      The study was marked in EPIC for immediate notification        Workstation performed: [id number]           [employer] Records Reviewed: Yes     ** Please Note: This note has been constructed using a voice recognition system   **

## 2019-12-31 NOTE — ED PROVIDER NOTES
History  Chief Complaint   Patient presents with    Shortness of Breath     78year old female presents with two day history of increasing cough, started with yellow mucous and states it has progressed to a greenish mucous  Denies fever or chills  States no Oxygen at home and her breathing has been getting worse  Placed on 2 liters nasal cannula and monitor  Prior to Admission Medications   Prescriptions Last Dose Informant Patient Reported? Taking? apixaban (ELIQUIS) 5 mg 12/31/2019 at Unknown time  No Yes   Sig: Take 1 tablet (5 mg total) by mouth 2 (two) times a day   atorvastatin (LIPITOR) 40 mg tablet 12/31/2019 at Unknown time  Yes Yes   Sig: atorvastatin 40 mg tablet   take one tab by mouth once daily   diltiazem (CARDIZEM CD) 360 MG 24 hr capsule 12/31/2019 at Unknown time  No Yes   Sig: Take 1 capsule (360 mg total) by mouth daily   escitalopram (LEXAPRO) 20 mg tablet 12/31/2019 at Unknown time  Yes Yes   Sig: Take by mouth Daily    furosemide (LASIX) 20 mg tablet 12/31/2019 at Unknown time  No Yes   Sig: Take 1 tablet (20 mg total) by mouth daily   lisinopril (ZESTRIL) 5 mg tablet 12/31/2019 at Unknown time  No Yes   Sig: Take 1 tablet (5 mg total) by mouth daily   metoprolol tartrate (LOPRESSOR) 50 mg tablet 12/31/2019 at Unknown time  No Yes   Sig: Take 1 tablet (50 mg total) by mouth every 12 (twelve) hours      Facility-Administered Medications: None       Past Medical History:   Diagnosis Date    Anxiety     Atrial fibrillation     Diastolic CHF     Hyperlipidemia     Hypertension     Obesity        Past Surgical History:   Procedure Laterality Date    CARDIOVERSION      TONSILLECTOMY         Family History   Problem Relation Age of Onset    Heart attack Mother     Hypertension Mother     Cancer Father      I have reviewed and agree with the history as documented      Social History     Tobacco Use    Smoking status: Never Smoker    Smokeless tobacco: Never Used Substance Use Topics    Alcohol use: Never     Frequency: Never     Comment: social    Drug use: Never        Review of Systems   Constitutional: Positive for activity change  HENT: Positive for congestion  Eyes: Negative  Respiratory: Positive for cough, shortness of breath and wheezing  Negative for apnea, chest tightness and stridor  Cardiovascular: Positive for leg swelling  Negative for chest pain and palpitations  Gastrointestinal: Negative  Endocrine: Negative  Musculoskeletal: Negative  Skin: Negative  Allergic/Immunologic: Negative  Neurological: Negative  Hematological: Negative  Psychiatric/Behavioral: Negative  Physical Exam  Physical Exam   Constitutional: She is oriented to person, place, and time  She appears well-developed and well-nourished  Non-toxic appearance  She does not appear ill  No distress  She is not intubated  HENT:   Head: Normocephalic and atraumatic  Mouth/Throat: Oropharynx is clear and moist  No oropharyngeal exudate or posterior oropharyngeal edema  Eyes: Pupils are equal, round, and reactive to light  EOM are normal    Neck: Normal range of motion  Neck supple  No hepatojugular reflux and no JVD present  No tracheal deviation present  No thyromegaly present  Cardiovascular: Normal rate, regular rhythm, normal heart sounds and intact distal pulses  No extrasystoles are present  Exam reveals no gallop, no friction rub and no decreased pulses  No murmur heard  Pulmonary/Chest: Accessory muscle usage present  No stridor  No tachypnea  She is not intubated  She has decreased breath sounds in the right lower field  She has wheezes in the left lower field  Abdominal: Soft  Bowel sounds are normal  She exhibits no distension, no ascites and no mass  There is no splenomegaly or hepatomegaly  There is no tenderness  There is no rebound and no guarding  Lymphadenopathy:     She has no cervical adenopathy     Neurological: She is alert and oriented to person, place, and time  She is not disoriented  No cranial nerve deficit  Psychiatric: She has a normal mood and affect  Her behavior is normal  Her mood appears not anxious  She is not agitated         Vital Signs  ED Triage Vitals [12/31/19 0924]   Temperature Pulse Respirations Blood Pressure SpO2   98 °F (36 7 °C) (!) 135 18 129/85 91 %      Temp Source Heart Rate Source Patient Position - Orthostatic VS BP Location FiO2 (%)   Temporal Monitor Lying Left arm --      Pain Score       No Pain           Vitals:    01/02/20 0337 01/02/20 0643 01/02/20 0949 01/02/20 1633   BP: 123/74 127/85  136/80   Pulse: (!) 107 97 (!) 129 104   Patient Position - Orthostatic VS: Lying Lying  Lying         Visual Acuity      ED Medications  Medications   ondansetron (ZOFRAN) injection 4 mg (has no administration in time range)   furosemide (LASIX) injection 40 mg (40 mg Intravenous Given 1/2/20 0914)   acetaminophen (TYLENOL) tablet 650 mg (has no administration in time range)   apixaban (ELIQUIS) tablet 5 mg (5 mg Oral Given 1/2/20 0914)   diltiazem (CARDIZEM CD) 24 hr capsule 360 mg (360 mg Oral Given 1/2/20 0914)   escitalopram (LEXAPRO) tablet 20 mg (0 mg Oral Hold 1/2/20 0924)   lisinopril (ZESTRIL) tablet 5 mg (5 mg Oral Given 1/2/20 0914)   atorvastatin (LIPITOR) tablet 40 mg (40 mg Oral Given 1/2/20 1639)   hydrocodone-chlorpheniramine polistirex (TUSSIONEX) 10-8 mg/5 mL ER suspension 5 mL (has no administration in time range)   metoprolol (LOPRESSOR) injection 5 mg (5 mg Intravenous Given 1/1/20 1029)   levalbuterol (XOPENEX) inhalation solution 1 25 mg (has no administration in time range)   sodium chloride 0 9 % inhalation solution 3 mL (has no administration in time range)   metoprolol tartrate (LOPRESSOR) tablet 50 mg (50 mg Oral Given 1/2/20 1639)   furosemide (LASIX) tablet 20 mg (20 mg Oral Given 12/31/19 1051)   metoprolol tartrate (LOPRESSOR) tablet 50 mg (50 mg Oral Given 12/31/19 124) methylPREDNISolone sodium succinate (Solu-MEDROL) injection 60 mg (60 mg Intravenous Given 12/31/19 1442)   hydrocodone-chlorpheniramine polistirex (TUSSIONEX) 10-8 mg/5 mL ER suspension 5 mL (5 mL Oral Given 12/31/19 1440)       Diagnostic Studies  Results Reviewed     Procedure Component Value Units Date/Time    Procalcitonin [[id number]]  (Normal) Collected:  01/01/20    Lab Status:  Final result Specimen:  Blood from Arm, Left Updated:  01/01/20 2255     Procalcitonin <0 05 ng/ml     Hemoglobin A1C [[id number]] Collected:  01/01/20 0525    Lab Status:  Final result Specimen:  Blood from Arm, Left Updated:  01/01/20 1422     Hemoglobin A1C 5 5 %       mg/dl     Basic metabolic panel [[id number]]  (Abnormal) Collected:  01/01/20 0532    Lab Status:  Final result Specimen:  Blood Updated:  01/01/20 4082     Sodium 139 mmol/L      Potassium 3 5 mmol/L      Chloride 100 mmol/L      CO2 31 mmol/L      ANION GAP 8 mmol/L      BUN 20 mg/dL      Creatinine 0 69 mg/dL      Glucose 157 mg/dL      Calcium 9 2 mg/dL      eGFR 88 ml/min/1 73sq m     Narrative:       [place] guidelines for Chronic Kidney Disease (CKD):     Stage 1 with normal or high GFR (GFR > 90 mL/min/1 73 square meters)    Stage 2 Mild CKD (GFR = 60-89 mL/min/1 73 square meters)    Stage 3A Moderate CKD (GFR = 45-59 mL/min/1 73 square meters)    Stage 3B Moderate CKD (GFR = 30-44 mL/min/1 73 square meters)    Stage 4 Severe CKD (GFR = 15-29 mL/min/1 73 square meters)    Stage 5 End Stage CKD (GFR <15 mL/min/1 73 square meters)  Note: GFR calculation is accurate only with a steady state creatinine    Magnesium [[id number]]  (Normal) Collected:  01/01/20 0532    Lab Status:  Final result Specimen:  Blood Updated:  01/01/20 0628     Magnesium 2 2 mg/dL     CBC (With Platelets) [[id number]]  (Abnormal) Collected:  01/01/20 0525    Lab Status:  Final result Specimen:  Blood from Arm, Left Updated:  01/01/20 0608     WBC 9 00 Thousand/uL      RBC 4 21 Million/uL      Hemoglobin 12 0 g/dL      Hematocrit 37 1 %      MCV 88 fL      MCH 28 4 pg      MCHC 32 3 g/dL      RDW 14 7 %      Platelets 565 Thousands/uL      MPV 8 0 fL     Basic metabolic panel [[id number]]  (Abnormal) Collected:  12/31/19 0959    Lab Status:  Final result Specimen:  Blood from Arm, Right Updated:  12/31/19 1040     Sodium 140 mmol/L      Potassium 3 5 mmol/L      Chloride 104 mmol/L      CO2 30 mmol/L      ANION GAP 6 mmol/L      BUN 21 mg/dL      Creatinine 0 71 mg/dL      Glucose 125 mg/dL      Calcium 9 1 mg/dL      eGFR 87 ml/min/1 73sq m     Narrative:       [facility] guidelines for Chronic Kidney Disease (CKD):     Stage 1 with normal or high GFR (GFR > 90 mL/min/1 73 square meters)    Stage 2 Mild CKD (GFR = 60-89 mL/min/1 73 square meters)    Stage 3A Moderate CKD (GFR = 45-59 mL/min/1 73 square meters)    Stage 3B Moderate CKD (GFR = 30-44 mL/min/1 73 square meters)    Stage 4 Severe CKD (GFR = 15-29 mL/min/1 73 square meters)    Stage 5 End Stage CKD (GFR <15 mL/min/1 73 square meters)  Note: GFR calculation is accurate only with a steady state creatinine    CK (with reflex to MB) [[id number]]  (Normal) Collected:  12/31/19 0959    Lab Status:  Final result Specimen:  Blood from Arm, Right Updated:  12/31/19 1040     Total CK 61 U/L     Troponin I [[id number]]  (Normal) Collected:  12/31/19 0959    Lab Status:  Final result Specimen:  Blood from Arm, Right Updated:  12/31/19 1040     Troponin I <0 03 ng/mL     CBC [[id number]]  (Abnormal) Collected:  12/31/19 0959    Lab Status:  Final result Specimen:  Blood from Arm, Right Updated:  12/31/19 1012     WBC 13 80 Thousand/uL      RBC 4 30 Million/uL      Hemoglobin 12 0 g/dL      Hematocrit 37 9 %      MCV 88 fL      MCH 28 0 pg      MCHC 31 8 g/dL      RDW 15 0 %      Platelets 557 Thousands/uL      MPV 7 4 fL                  XR chest pa & lateral   Final Result by Theodore Khan MD [place] (12/31 1133)      1  Diffuse interstitial opacities, increased from prior  Favor pulmonary edema  2   Small bilateral pleural effusions, also increased,      The study was marked in EPIC for immediate notification  Workstation performed: [id number]                    Procedures  Procedures         ED Course                               MDM  79 y/o female presents with shortness of breath, O2 nasal cannula applied, orders written and lasix daily dose given  Reports breathing improved with oxygen, pure wick in for urinary care  Discussed CXR results with attending and in agreeance to admit patient and diuresis  Spoke with Lisa Vogel from hospitals and will be admitting under Dr Jihan Viveros  Disposition  Final diagnoses:   CHF (congestive heart failure) ([facility])   SOB (shortness of breath)   Atrial fibrillation ([facility] [address] )     Time reflects when diagnosis was documented in both MDM as applicable and the Disposition within this note     Time User Action Codes Description Comment    12/31/2019 12:24 PM Nalini Butt Add [I50 9] CHF (congestive heart failure) ([address] )     12/31/2019 12:24 PM Nalini Butt Add [R06 02] SOB (shortness of breath)     12/31/2019 12:25 PM Nalini Aguada Add [I48 91] Atrial fibrillation ([facility] [address] )     12/31/2019 12:47 PM Althia Pickup D Add [I50 33] Acute on chronic heart failure with preserved ejection fraction [facility])       ED Disposition     ED Disposition Condition Date/Time Comment    Admit Stable Tue Dec 31, 2019 12:24 PM Case was discussed with Lisa Vogel PA-c and the patient's admission status was agreed to be Admission Status: inpatient status to the service of Dr Jihan Viveros           Follow-up Information    None         Current Discharge Medication List      CONTINUE these medications which have NOT CHANGED    Details   apixaban (ELIQUIS) 5 mg Take 1 tablet (5 mg total) by mouth 2 (two) times a day  Qty: 60 tablet, Refills: 1    Associated Diagnoses: Atrial fibrillation with rapid ventricular response (HCC)      atorvastatin (LIPITOR) 40 mg tablet atorvastatin 40 mg tablet   take one tab by mouth once daily      diltiazem (CARDIZEM CD) 360 MG 24 hr capsule Take 1 capsule (360 mg total) by mouth daily  Qty: 30 capsule, Refills: 0    Associated Diagnoses: Atrial fibrillation with rapid ventricular response (HCC)      escitalopram (LEXAPRO) 20 mg tablet Take by mouth Daily       furosemide (LASIX) 20 mg tablet Take 1 tablet (20 mg total) by mouth daily  Qty: 30 tablet, Refills: 0    Associated Diagnoses: Acute exacerbation of CHF (congestive heart failure) (HCC)      lisinopril (ZESTRIL) 5 mg tablet Take 1 tablet (5 mg total) by mouth daily  Qty: 30 tablet, Refills: 0    Associated Diagnoses: Morbid obesity (HCC)      metoprolol tartrate (LOPRESSOR) 50 mg tablet Take 1 tablet (50 mg total) by mouth every 12 (twelve) hours  Qty: 60 tablet, Refills: 0    Associated Diagnoses: Atrial fibrillation with rapid ventricular response (HCC)           No discharge procedures on file      ED Provider  Electronically Signed by           AYO Urias  [facility]31/19 [facility]  01/02/20 3485

## 2019-12-31 NOTE — ASSESSMENT & PLAN NOTE
· Patient was seen by Cardiology approximately 2 weeks ago no reviewed  Patient reported not feeling any better after transesophageal cardioversion    Plan is to continue concentrating on rate control and continue anticoagulation  · Heart rate was in the 120s will request Cardiology consult

## 2019-12-31 NOTE — ASSESSMENT & PLAN NOTE
Wt Readings from Last 3 Encounters:   12/31/19 124 kg (273 lb)   12/20/19 130 kg (285 lb 11 5 oz)   09/12/19 115 kg (254 lb)     Continue diuresis  Continue rate control on medications  Cardiology input

## 2019-12-31 NOTE — PLAN OF CARE
Problem: CARDIOVASCULAR - ADULT  Goal: Maintains optimal cardiac output and hemodynamic stability  Description  INTERVENTIONS:  - Monitor I/O, vital signs and rhythm  - Monitor for S/S and trends of decreased cardiac output  - Assess quality of pulses, skin color and temperature  - Assess for signs of decreased coronary artery perfusion  - Instruct patient to report change in severity of symptoms   Outcome: Progressing     Problem: RESPIRATORY - ADULT  Goal: Achieves optimal ventilation and oxygenation  Description  INTERVENTIONS:  - Assess for changes in respiratory status  - Assess for changes in mentation and behavior  - Position to facilitate oxygenation and minimize respiratory effort  - Oxygen administered by appropriate delivery if ordered  - Encourage broncho-pulmonary hygiene including cough, deep breathe, Incentive Spirometry  - Assess and instruct to report SOB or any respiratory difficulty  - Respiratory Therapy support as indicated   Outcome: Progressing

## 2020-01-01 LAB
ANION GAP SERPL CALCULATED.3IONS-SCNC: 8 MMOL/L (ref 4–13)
ATRIAL RATE: 77 BPM
BUN SERPL-MCNC: 20 MG/DL (ref 7–25)
CALCIUM SERPL-MCNC: 9.2 MG/DL (ref 8.6–10.5)
CHLORIDE SERPL-SCNC: 100 MMOL/L (ref 98–107)
CO2 SERPL-SCNC: 31 MMOL/L (ref 21–31)
CREAT SERPL-MCNC: 0.69 MG/DL (ref 0.6–1.2)
ERYTHROCYTE [DISTWIDTH] IN BLOOD BY AUTOMATED COUNT: 14.7 % (ref 11.5–14.5)
EST. AVERAGE GLUCOSE BLD GHB EST-MCNC: 111 MG/DL
GFR SERPL CREATININE-BSD FRML MDRD: 88 ML/MIN/1.73SQ M
GLUCOSE SERPL-MCNC: 157 MG/DL (ref 65–99)
HBA1C MFR BLD: 5.5 % (ref 4.2–6.3)
HCT VFR BLD AUTO: 37.1 % (ref 42–47)
HGB BLD-MCNC: 12 G/DL (ref 12–16)
MAGNESIUM SERPL-MCNC: 2.2 MG/DL (ref 1.9–2.7)
MCH RBC QN AUTO: 28.4 PG (ref 26–34)
MCHC RBC AUTO-ENTMCNC: 32.3 G/DL (ref 31–37)
MCV RBC AUTO: 88 FL (ref 81–99)
PLATELET # BLD AUTO: 260 THOUSANDS/UL (ref 149–390)
PMV BLD AUTO: 8 FL (ref 8.6–11.7)
POTASSIUM SERPL-SCNC: 3.5 MMOL/L (ref 3.5–5.5)
PROCALCITONIN SERPL-MCNC: <0.05 NG/ML
QRS AXIS: 4 DEGREES
QRSD INTERVAL: 90 MS
QT INTERVAL: 318 MS
QTC INTERVAL: 455 MS
RBC # BLD AUTO: 4.21 MILLION/UL (ref 3.9–5.2)
SODIUM SERPL-SCNC: 139 MMOL/L (ref 134–143)
T WAVE AXIS: 249 DEGREES
TROPONIN I SERPL-MCNC: <0.03 NG/ML
TROPONIN I SERPL-MCNC: <0.03 NG/ML
VENTRICULAR RATE: 123 BPM
WBC # BLD AUTO: 9 THOUSAND/UL (ref 4.8–10.8)

## 2020-01-01 PROCEDURE — 83735 ASSAY OF MAGNESIUM: CPT | Performed by: PHYSICIAN ASSISTANT

## 2020-01-01 PROCEDURE — 80048 BASIC METABOLIC PNL TOTAL CA: CPT | Performed by: PHYSICIAN ASSISTANT

## 2020-01-01 PROCEDURE — 84145 PROCALCITONIN (PCT): CPT | Performed by: PHYSICIAN ASSISTANT

## 2020-01-01 PROCEDURE — 84484 ASSAY OF TROPONIN QUANT: CPT | Performed by: NURSE PRACTITIONER

## 2020-01-01 PROCEDURE — 85027 COMPLETE CBC AUTOMATED: CPT | Performed by: PHYSICIAN ASSISTANT

## 2020-01-01 PROCEDURE — 99232 SBSQ HOSP IP/OBS MODERATE 35: CPT | Performed by: HOSPITALIST

## 2020-01-01 PROCEDURE — 94640 AIRWAY INHALATION TREATMENT: CPT

## 2020-01-01 PROCEDURE — 94760 N-INVAS EAR/PLS OXIMETRY 1: CPT

## 2020-01-01 PROCEDURE — 83036 HEMOGLOBIN GLYCOSYLATED A1C: CPT | Performed by: PHYSICIAN ASSISTANT

## 2020-01-01 PROCEDURE — 93010 ELECTROCARDIOGRAM REPORT: CPT | Performed by: INTERNAL MEDICINE

## 2020-01-01 PROCEDURE — 99222 1ST HOSP IP/OBS MODERATE 55: CPT | Performed by: INTERNAL MEDICINE

## 2020-01-01 RX ORDER — LEVALBUTEROL 1.25 MG/.5ML
1.25 SOLUTION, CONCENTRATE RESPIRATORY (INHALATION) EVERY 6 HOURS PRN
Status: DISCONTINUED | OUTPATIENT
Start: 2020-01-01 | End: 2020-01-04 | Stop reason: HOSPADM

## 2020-01-01 RX ORDER — SODIUM CHLORIDE FOR INHALATION 0.9 %
3 VIAL, NEBULIZER (ML) INHALATION EVERY 6 HOURS PRN
Status: DISCONTINUED | OUTPATIENT
Start: 2020-01-01 | End: 2020-01-04 | Stop reason: HOSPADM

## 2020-01-01 RX ADMIN — APIXABAN 5 MG: 5 TABLET, FILM COATED ORAL at 17:40

## 2020-01-01 RX ADMIN — APIXABAN 5 MG: 5 TABLET, FILM COATED ORAL at 08:01

## 2020-01-01 RX ADMIN — DILTIAZEM HYDROCHLORIDE 360 MG: 180 CAPSULE, COATED, EXTENDED RELEASE ORAL at 08:02

## 2020-01-01 RX ADMIN — METOPROLOL TARTRATE 50 MG: 50 TABLET, FILM COATED ORAL at 08:02

## 2020-01-01 RX ADMIN — FUROSEMIDE 40 MG: 10 INJECTION, SOLUTION INTRAMUSCULAR; INTRAVENOUS at 08:02

## 2020-01-01 RX ADMIN — ESCITALOPRAM OXALATE 20 MG: 10 TABLET ORAL at 08:02

## 2020-01-01 RX ADMIN — METOPROLOL TARTRATE 50 MG: 50 TABLET, FILM COATED ORAL at 20:48

## 2020-01-01 RX ADMIN — ATORVASTATIN CALCIUM 40 MG: 40 TABLET, FILM COATED ORAL at 15:45

## 2020-01-01 RX ADMIN — LISINOPRIL 5 MG: 5 TABLET ORAL at 08:01

## 2020-01-01 RX ADMIN — FUROSEMIDE 40 MG: 10 INJECTION, SOLUTION INTRAMUSCULAR; INTRAVENOUS at 17:38

## 2020-01-01 RX ADMIN — METOPROLOL TARTRATE 5 MG: 5 INJECTION, SOLUTION INTRAVENOUS at 10:29

## 2020-01-01 NOTE — ASSESSMENT & PLAN NOTE
· Patient remains slightly tachycardic  · Continue Cardizem 360 mg daily p o   · Continue p r n   Metoprolol  · Continue anticoagulation with apixaban

## 2020-01-01 NOTE — PLAN OF CARE
Problem: Potential for Falls  Goal: Patient will remain free of falls  Description  INTERVENTIONS:  - Assess patient frequently for physical needs  -  Identify cognitive and physical deficits and behaviors that affect risk of falls  -  [place] fall precautions as indicated by assessment   - Educate patient/family on patient safety including physical limitations  - Instruct patient to call for assistance with activity based on assessment  - Modify environment to reduce risk of injury  - Consider OT/PT consult to assist with strengthening/mobility  Outcome: Progressing     Problem: Prexisting or High Potential for Compromised Skin Integrity  Goal: Skin integrity is maintained or improved  Description  INTERVENTIONS:  - Identify patients at risk for skin breakdown  - Assess and monitor skin integrity  - Assess and monitor nutrition and hydration status  - Monitor labs   - Assess for incontinence   - Assist with mobility/ambulation  - Relieve pressure over bony prominences  - Avoid friction and shearing  - Provide appropriate hygiene as needed including keeping skin clean and dry  - Evaluate need for skin moisturizer/barrier cream  - Collaborate with interdisciplinary team   - Patient/family teaching     Outcome: Progressing     Problem: SAFETY ADULT  Goal: Patient will remain free of falls  Description  INTERVENTIONS:  - Assess patient frequently for physical needs  -  Identify cognitive and physical deficits and behaviors that affect risk of falls    -  [place] fall precautions as indicated by assessment   - Educate patient/family on patient safety including physical limitations  - Instruct patient to call for assistance with activity based on assessment  - Modify environment to reduce risk of injury  - Consider OT/PT consult to assist with strengthening/mobility  Outcome: Progressing     Problem: DISCHARGE PLANNING  Goal: Discharge to home or other facility with appropriate resources  Description  INTERVENTIONS:  - Identify barriers to discharge w/patient and caregiver  - Arrange for needed discharge resources and transportation as appropriate  - Identify discharge learning needs (meds, wound care, etc )  - Refer to Case Management Department for coordinating discharge planning if the patient needs post-hospital services based on physician/advanced practitioner order or complex needs related to functional status, cognitive ability, or social support system   Outcome: Progressing     Problem: Knowledge Deficit  Goal: Patient/family/caregiver demonstrates understanding of disease process, treatment plan, medications, and discharge instructions  Description  Complete learning assessment and assess knowledge base    Interventions:  - Provide teaching at level of understanding  - Provide teaching via preferred learning methods  Outcome: Progressing     Problem: CARDIOVASCULAR - ADULT  Goal: Maintains optimal cardiac output and hemodynamic stability  Description  INTERVENTIONS:  - Monitor I/O, vital signs and rhythm  - Monitor for S/S and trends of decreased cardiac output  - Assess quality of pulses, skin color and temperature  - Assess for signs of decreased coronary artery perfusion  - Instruct patient to report change in severity of symptoms   Outcome: Progressing     Problem: RESPIRATORY - ADULT  Goal: Achieves optimal ventilation and oxygenation  Description  INTERVENTIONS:  - Assess for changes in respiratory status  - Assess for changes in mentation and behavior  - Position to facilitate oxygenation and minimize respiratory effort  - Oxygen administered by appropriate delivery if ordered  - Encourage broncho-pulmonary hygiene including cough, deep breathe, Incentive Spirometry  - Assess and instruct to report SOB or any respiratory difficulty  - Respiratory Therapy support as indicated   Outcome: Progressing

## 2020-01-01 NOTE — NURSING NOTE
Pt afib, IV lopressor previous shift, po lopressor scheduled at 2100  Heart rate has improved however heart rate going from high 80s to 130-140 randomly  Dorian Wei NP aware, no new orders will monitor

## 2020-01-01 NOTE — PLAN OF CARE
Problem: Potential for Falls  Goal: Patient will remain free of falls  Description  INTERVENTIONS:  - Assess patient frequently for physical needs  -  Identify cognitive and physical deficits and behaviors that affect risk of falls  -  [place] fall precautions as indicated by assessment   - Educate patient/family on patient safety including physical limitations  - Instruct patient to call for assistance with activity based on assessment  - Modify environment to reduce risk of injury  - Consider OT/PT consult to assist with strengthening/mobility  Outcome: Progressing     Problem: Prexisting or High Potential for Compromised Skin Integrity  Goal: Skin integrity is maintained or improved  Description  INTERVENTIONS:  - Identify patients at risk for skin breakdown  - Assess and monitor skin integrity  - Assess and monitor nutrition and hydration status  - Monitor labs   - Assess for incontinence   - Assist with mobility/ambulation  - Relieve pressure over bony prominences  - Avoid friction and shearing  - Provide appropriate hygiene as needed including keeping skin clean and dry  - Evaluate need for skin moisturizer/barrier cream  - Collaborate with interdisciplinary team   - Patient/family teaching     Outcome: Progressing     Problem: SAFETY ADULT  Goal: Patient will remain free of falls  Description  INTERVENTIONS:  - Assess patient frequently for physical needs  -  Identify cognitive and physical deficits and behaviors that affect risk of falls    -  [place] fall precautions as indicated by assessment   - Educate patient/family on patient safety including physical limitations  - Instruct patient to call for assistance with activity based on assessment  - Modify environment to reduce risk of injury  - Consider OT/PT consult to assist with strengthening/mobility  Outcome: Progressing     Problem: DISCHARGE PLANNING  Goal: Discharge to home or other facility with appropriate resources  Description  INTERVENTIONS:  - Identify barriers to discharge w/patient and caregiver  - Arrange for needed discharge resources and transportation as appropriate  - Identify discharge learning needs (meds, wound care, etc )  - Refer to Case Management Department for coordinating discharge planning if the patient needs post-hospital services based on physician/advanced practitioner order or complex needs related to functional status, cognitive ability, or social support system   Outcome: Progressing     Problem: Knowledge Deficit  Goal: Patient/family/caregiver demonstrates understanding of disease process, treatment plan, medications, and discharge instructions  Description  Complete learning assessment and assess knowledge base    Interventions:  - Provide teaching at level of understanding  - Provide teaching via preferred learning methods  Outcome: Progressing     Problem: CARDIOVASCULAR - ADULT  Goal: Maintains optimal cardiac output and hemodynamic stability  Description  INTERVENTIONS:  - Monitor I/O, vital signs and rhythm  - Monitor for S/S and trends of decreased cardiac output  - Assess quality of pulses, skin color and temperature  - Assess for signs of decreased coronary artery perfusion  - Instruct patient to report change in severity of symptoms   Outcome: Progressing     Problem: RESPIRATORY - ADULT  Goal: Achieves optimal ventilation and oxygenation  Description  INTERVENTIONS:  - Assess for changes in respiratory status  - Assess for changes in mentation and behavior  - Position to facilitate oxygenation and minimize respiratory effort  - Oxygen administered by appropriate delivery if ordered  - Encourage broncho-pulmonary hygiene including cough, deep breathe, Incentive Spirometry  - Assess and instruct to report SOB or any respiratory difficulty  - Respiratory Therapy support as indicated   Outcome: Progressing

## 2020-01-01 NOTE — CONSULTS
Consultation - Cardiology   Stephanie Mcneill 78 y o  female MRN: [id number]  Unit/Bed#: -01 Encounter: [id number]    Assessment/Plan     Assessment:  Paroxysmal Atrial Fibrillation  Acute on Chronic HFpEF  Morbid Obesity  URI  Hypertension  Hyperlipidemia    Plan:  -Agree with IV diuresis; continue strict I&O daily weights  Replace electrolytes PRN  -HR intermittently as high as the 150s; she is on cardizem as well as metoprolol  Continue metoprolol IV PRN and escalate oral dose as needed to optimize rate control  -Poornima Odor is at least 4 (CHF, Age, Gender, HTN) Continue eliquis for stroke prevention   -She will benefit from sleep study and subsequent control of sleep disordered breathing, as per patient she is scheduled for that  -Rest as per primary team    History of Present Illness   Physician Requesting Consult: Philippe Gastelum MD  Reason for Consult / Principal Problem: Atrial Fibrillation  HPI: Stephanie Mcneill is a 78y o  year old female who presents with shortness of breath for the past 2-3 days which started with a sore throat and cough productive of yellow/green sputum  She has had no fever or chills  She also went to rapid atrial fibrillation today which remains asymptomatic when seen by me  She denies orthopnea but admits to PND, worsening leg edema  She has been on intermittent IV diuresis since admission and reports feeling much better  There is no history of chest pain or any other complaints  Inpatient consult to Cardiology  Consult performed by: Lawanda Porter MD  Consult ordered by: Emilio Wolff PA-C          Review of Systems   All other systems reviewed and are negative        Historical Information   Past Medical History:   Diagnosis Date    Anxiety     Atrial fibrillation     Hyperlipidemia     Hypertension     Obesity      Past Surgical History:   Procedure Laterality Date    CARDIOVERSION      TONSILLECTOMY       Social History     Substance and Sexual Activity Alcohol Use Never    Frequency: Never    Comment: social     Social History     Substance and Sexual Activity   Drug Use Never     Social History     Tobacco Use   Smoking Status Never Smoker   Smokeless Tobacco Never Used     Family History: non-contributory    Meds/Allergies   all current active meds have been reviewed and current meds:   Current Facility-Administered Medications   Medication Dose Route Frequency    acetaminophen (TYLENOL) tablet 650 mg  650 mg Oral Q4H PRN    apixaban (ELIQUIS) tablet 5 mg  5 mg Oral BID    atorvastatin (LIPITOR) tablet 40 mg  40 mg Oral Daily With Dinner    diltiazem (CARDIZEM CD) 24 hr capsule 360 mg  360 mg Oral Daily    escitalopram (LEXAPRO) tablet 20 mg  20 mg Oral Daily    furosemide (LASIX) injection 40 mg  40 mg Intravenous BID    hydrocodone-chlorpheniramine polistirex (TUSSIONEX) 10-8 mg/5 mL ER suspension 5 mL  5 mL Oral Q12H PRN    levalbuterol (XOPENEX) inhalation solution 1 25 mg  1 25 mg Nebulization Q6H PRN    lisinopril (ZESTRIL) tablet 5 mg  5 mg Oral Daily    metoprolol (LOPRESSOR) injection 5 mg  5 mg Intravenous Q6H PRN    metoprolol tartrate (LOPRESSOR) tablet 50 mg  50 mg Oral Q12H FRANCISCO    ondansetron (ZOFRAN) injection 4 mg  4 mg Intravenous Q6H PRN    sodium chloride 0 9 % inhalation solution 3 mL  3 mL Nebulization Q6H PRN     Allergies   Allergen Reactions    Other Itching     lobster    Penicillins Rash       Objective   Vitals: Blood pressure 131/83, pulse 105, temperature 97 5 °F (36 4 °C), temperature source Temporal, resp  rate 18, height 5' 2" (1 575 m), weight 126 kg (277 lb 5 4 oz), SpO2 95 %    Orthostatic Blood Pressures      Most Recent Value   Blood Pressure  131/83 filed at 01/01/2020 1359   Patient Position - Orthostatic VS  Sitting filed at 01/01/2020 1359            Intake/Output Summary (Last 24 hours) at 1/1/2020 1723  Last data filed at 1/1/2020 1500  Gross per 24 hour   Intake 350 ml   Output 1501 ml   Net -1151 ml Invasive Devices     Peripheral Intravenous Line            Peripheral IV 12/31/19 Right Antecubital 1 day                Physical Exam   Constitutional: She is oriented to person, place, and time  She appears well-developed and well-nourished  HENT:   Head: Normocephalic and atraumatic  Eyes: Pupils are equal, round, and reactive to light  EOM are normal    Neck: Normal range of motion  Cardiovascular: An irregularly irregular rhythm present  Tachycardia present  Pulmonary/Chest: She has decreased breath sounds  Abdominal: Soft  Neurological: She is alert and oriented to person, place, and time  Skin: Skin is warm and dry  Lab Results:   I have personally reviewed pertinent lab results  CBC with diff:   Results from last 7 days   Lab Units 01/01/20  0525   WBC Thousand/uL 9 00   RBC Million/uL 4 21   HEMOGLOBIN g/dL 12 0   HEMATOCRIT % 37 1*   MCV fL 88   MCH pg 28 4   MCHC g/dL 32 3   RDW % 14 7*   MPV fL 8 0*   PLATELETS Thousands/uL 260     CMP:   Results from last 7 days   Lab Units 01/01/20  0532   SODIUM mmol/L 139   POTASSIUM mmol/L 3 5   CHLORIDE mmol/L 100   CO2 mmol/L 31   BUN mg/dL 20   CREATININE mg/dL 0 69   CALCIUM mg/dL 9 2   EGFR ml/min/1 73sq m 88     Troponin:   0   Lab Value Date/Time    TROPONINI <0 03 01/01/2020 0948    TROPONINI <0 03 01/01/2020 0525    TROPONINI <0 03 12/31/2019 0959    TROPONINI <0 03 12/17/2019 1244    TROPONINI <0 03 08/26/2019 1729    TROPONINI <0 03 08/26/2019 1239    TROPONINI <0 03 08/26/2019 0918     BNP:   Results from last 7 days   Lab Units 01/01/20  0532   POTASSIUM mmol/L 3 5   CHLORIDE mmol/L 100   CO2 mmol/L 31   BUN mg/dL 20   CREATININE mg/dL 0 69   CALCIUM mg/dL 9 2   EGFR ml/min/1 73sq m 88     Coags:     TSH:     Magnesium:   Results from last 7 days   Lab Units 01/01/20  0532   MAGNESIUM mg/dL 2 2     Imaging: I have personally reviewed pertinent reports      VTE Prophylaxis: Reason for no pharmacologic prophylaxis fully anticoagulated with eliquis  Code Status: Level 1 - Full Code  Advance Directive and Living Will:      Power of :    POLST:      Counseling / Coordination of Care  Total floor / unit time spent today 30 minutes  Greater than 50% of total time was spent with the patient and / or family counseling and / or coordination of care    A description of the counseling / coordination of care: discussions with primary team

## 2020-01-01 NOTE — ASSESSMENT & PLAN NOTE
Wt Readings from Last 3 Encounters:   01/01/20 126 kg (277 lb 5 4 oz)   12/20/19 130 kg (285 lb 11 5 oz)   09/12/19 115 kg (254 lb)     Patient has had a 5 lb weight loss over 24 hours-question the accuracy of the weights  Continue Lasix IV b i d    Case reviewed with Cardiology  Continue diuresis for today  Continue other current cardiac based medications which include apixaban, diltiazem, metoprolol, lisinopril and Lipitor

## 2020-01-01 NOTE — PROGRESS NOTES
Progress Note - Laquita Carlson 1950, 78 y o  female MRN: [id number]    Unit/Bed#: -01 Encounter: [id number]    Primary Care Provider: Consuelo Knapp MD   Date and time admitted to hospital: 12/31/2019  9:24 AM        * Acute on chronic heart failure with preserved ejection fraction [facility])  Assessment & Plan  Wt Readings from Last 3 Encounters:   01/01/20 126 kg (277 lb 5 4 oz)   12/20/19 130 kg (285 lb 11 5 oz)   09/12/19 115 kg (254 lb)     Patient has had a 5 lb weight loss over 24 hours-question the accuracy of the weights  Continue Lasix IV b i d  Case reviewed with Cardiology  Continue diuresis for today  Continue other current cardiac based medications which include apixaban, diltiazem, metoprolol, lisinopril and Lipitor      Persistent atrial fibrillation  Assessment & Plan  · Patient remains slightly tachycardic  · Continue Cardizem 360 mg daily p o   · Continue p r n  Metoprolol  · Continue anticoagulation with apixaban    Essential hypertension  Assessment & Plan  · Continue meds and monitor    Mixed dyslipidemia  Assessment & Plan  · Continue statin    [facility] ([facility] )  Assessment & Plan  · BMI calculated 49  · Weight loss recommended  · Follow-up dietitian as an outpatient    Major depressive disorder in full remission ([facility] )  Assessment & Plan  · Continue home medications    Leukemoid reaction  Assessment & Plan  · Reactive, leukocytosis resolved  · Check a procalcitonin level  · No fevers, chest x-ray findings reviewed, i clinically correlate this more with pulmonary edema  · No antibiotics needed    Hyperglycemia  Assessment & Plan  · A1c is 5 5%  · No further testing required        VTE Pharmacologic Prophylaxis: Pharmacologic: Apixaban (Eliquis)    Patient Centered Rounds: I have performed bedside rounds with nursing staff today      Discussions with Specialists or Other Care Team Provider:  Cardiology, case management, nursing and pharmacy  Education and Discussions with Family / Patient:  Patient and her daughter were brought up to par with the plan of care for today, all questions answered to her satisfaction    Current Length of Stay: 1 day(s)    Current Patient Status: Inpatient   Certification Statement: The patient will continue to require additional inpatient hospital stay due to The need for continued IV diuretics    Discharge Plan:  Discharge planning will commence once cleared by Cardiology    Code Status: Level 1 - Full Code    Subjective:   Patient seen and examined, no new complaints, no distress  Feels a lot better than prior to coming into the hospital    Objective:     Vitals:   Temp (24hrs), Av 8 °F (36 6 °C), Min:97 °F (36 1 °C), Max:99 4 °F (37 4 °C)    Temp:  [97 °F (36 1 °C)-99 4 °F (37 4 °C)] 97 5 °F (36 4 °C)  HR:  [] 105  Resp:  [18] 18  BP: (122-155)/(80-93) 131/83  SpO2:  [92 %-95 %] 95 %  Body mass index is 50 73 kg/m²  Input and Output Summary (last 24 hours): Intake/Output Summary (Last 24 hours) at 2020 1631  Last data filed at 2020 1500  Gross per 24 hour   Intake 670 ml   Output 1501 ml   Net -831 ml       Physical Exam:     Physical Exam   Constitutional: She is oriented to person, place, and time  She appears well-developed and well-nourished  HENT:   Head: Normocephalic and atraumatic  Nose: Nose normal    Mouth/Throat: Oropharynx is clear and moist    Eyes: Pupils are equal, round, and reactive to light  Conjunctivae and EOM are normal    Neck: Normal range of motion  Neck supple  No JVD present  No thyromegaly present  Cardiovascular: Intact distal pulses  Exam reveals no gallop and no friction rub  No murmur heard  S1 plus S2, irregularly irregular, tachycardic   Pulmonary/Chest: Effort normal and breath sounds normal  No respiratory distress  Decreased breath sounds bilaterally at the bases   Abdominal: Soft  Bowel sounds are normal  She exhibits no distension and no mass  There is no tenderness   There is no guarding  Musculoskeletal: Normal range of motion  She exhibits no edema  Trace to 1+ bilateral lower extremity pitting edema   Lymphadenopathy:     She has no cervical adenopathy  Neurological: She is alert and oriented to person, place, and time  No cranial nerve deficit  Skin: Skin is warm  No rash noted  No erythema  Psychiatric: She has a normal mood and affect  Her behavior is normal    Vitals reviewed  Additional Data:     Labs:    Results from last 7 days   Lab Units 01/01/20  0525   WBC Thousand/uL 9 00   HEMOGLOBIN g/dL 12 0   HEMATOCRIT % 37 1*   PLATELETS Thousands/uL 260     Results from last 7 days   Lab Units 01/01/20  0532   POTASSIUM mmol/L 3 5   CHLORIDE mmol/L 100   CO2 mmol/L 31   BUN mg/dL 20   CREATININE mg/dL 0 69   CALCIUM mg/dL 9 2             Results from last 7 days   Lab Units 01/01/20  0525   HEMOGLOBIN A1C % 5 5       * I Have Reviewed All Lab Data Listed Above  * Additional Pertinent Lab Tests Reviewed:  Gilda 65 Admission  Reviewed    Imaging:  Imaging Reports Reviewed Today Include:  None    Recent Cultures (last 7 days):           Last 24 Hours Medication List:     Current Facility-Administered Medications:  acetaminophen 650 mg Oral Q4H PRN Lonzie Stable, PA-C   apixaban 5 mg Oral BID Lonzie Stable, PA-C   atorvastatin 40 mg Oral Daily With [employer], PA-C   diltiazem 360 mg Oral Daily Lonzie Stable, PA-C   escitalopram 20 mg Oral Daily Lonzie Stable, PA-C   furosemide 40 mg Intravenous BID Lonzie Stable, PA-C   hydrocodone-chlorpheniramine polistirex 5 mL Oral Q12H PRN Lonzie Stable, PA-C   levalbuterol 1 25 mg Nebulization Q6H PRN Rishi Lundberg MD   lisinopril 5 mg Oral Daily Lonzie Stable, PA-C   metoprolol 5 mg Intravenous Q6H PRN Lonzie Stable, PA-C   metoprolol tartrate 50 mg Oral Q12H [address] YRIS Ma-RAE   ondansetron 4 mg Intravenous Q6H PRN Lonzie Stable, PA-C   sodium chloride 3 mL Nebulization Q6H PRN Philippe Gastelum MD        Today, Patient Was Seen By: Philippe Gastelum MD    ** Please Note: Dictation voice to text software may have been used in the creation of this document   **

## 2020-01-01 NOTE — NURSING NOTE
Pt resting in bedside chair  5467 on tele, afib, heart rate ranges between 100 - 150 bpm, IV metoprolol administered as ordered and hospitalist aware  States, "my breathing is so much better, but I still get short of breath and cant make it to the bathroom" marcel continued, pt refuses to attempt walk to bathroom  2L O2 nc maintained, lungs CTA diminished throughout, dry cough  acapella at bedside  Denies pain  Family at bedside  Personal needs and call bell within reach, will continue to monitor

## 2020-01-01 NOTE — ASSESSMENT & PLAN NOTE
· Reactive, leukocytosis resolved  · Check a procalcitonin level  · No fevers, chest x-ray findings reviewed, i clinically correlate this more with pulmonary edema  · No antibiotics needed

## 2020-01-01 NOTE — UTILIZATION REVIEW
Initial Clinical Review    Admission: Date/Time/Statement: Inpatient Admission Orders (From admission, onward)     Ordered        12/31/19 1233  Inpatient Admission (expected length of stay for this patient Order details is greater than two midnights)  Once                   Orders Placed This Encounter   Procedures    Inpatient Admission (expected length of stay for this patient Order details is greater than two midnights)     Standing Status:   Standing     Number of Occurrences:   1     Order Specific Question:   Admitting Physician     Answer:   Archana Weaver [[id number]]     Order Specific Question:   Level of Care     Answer:   Med Surg [16]     Order Specific Question:   Estimated length of stay     Answer:   More than 2 Midnights     Order Specific Question:   Certification     Answer:   I certify that inpatient services are medically necessary for this patient for a duration of greater than two midnights  See H&P and MD Progress Notes for additional information about the patient's course of treatment  ED Arrival Information     Expected Arrival Acuity Means of Arrival Escorted By Service Admission Type    - 12/31/2019 09:17 Emergent Wheelchair Friend General Medicine Emergency    Arrival Complaint    shortness of breath        Chief Complaint   Patient presents with    Shortness of Breath     Assessment/Plan:   77 yo female ambulatory to ER from home c/o increasing SOB, productive cough  Hx anxiety, afib, hld, htn, obesity  Presents tachycardic, weak & lightheaded, sob using accessory muscles, productive cough for yellow/green mucous, wheezing, ble edema  Admission work-up showing afib with rvr, leukocytosis, pulmonary edema, pleural effusions  Admitted to inpatient status for acute on chronic CHF, started on Iv Lasix BID, placed on fluid restriction, cardio & nutrition consulted     ED Triage Vitals [12/31/19 0924]   Temperature Pulse Respirations Blood Pressure SpO2   98 °F (36 7 °C) (!) 135 18 129/85 91 %      Temp Source Heart Rate Source Patient Position - Orthostatic VS BP Location FiO2 (%)   Temporal Monitor Lying Left arm --      Pain Score       No Pain        Wt Readings from Last 1 Encounters:   01/01/20 126 kg (277 lb 5 4 oz)     Additional Vital Signs:   12/31/19 1951  97 5 °F (36 4 °C)  98  18  141/93  93 %  Nasal cannula  Lying   12/31/19 1605  96 °F (35 6 °C)Abnormal   65  18  140/84  93 %  Nasal cannula  Lying   12/31/19 1319            Nasal cannula     12/31/19 1317  98 1 °F (36 7 °C)  95  20  138/78  94 %  Nasal cannula  Lying   12/31/19 1245    132Abnormal   26Abnormal   148/65  96 %  Nasal cannula  Lying   12/31/19 1045    126Abnormal   20  127/81  94 %  None (Room air)  Lying   12/31/19 0924  98 °F (36 7 °C)  135Abnormal   18  129/85  91 %  None (Room air)  Lying   Pertinent Labs/Diagnostic Test Results:   Results from last 7 days   Lab Units 01/01/20  0525 12/31/19  0959   WBC Thousand/uL 9 00 13 80*   HEMOGLOBIN g/dL 12 0 12 0   HEMATOCRIT % 37 1* 37 9*   PLATELETS Thousands/uL 260 254     Results from last 7 days   Lab Units 01/01/20  0532 12/31/19  0959   SODIUM mmol/L 139 140   POTASSIUM mmol/L 3 5 3 5   CHLORIDE mmol/L 100 104   CO2 mmol/L 31 30   ANION GAP mmol/L 8 6   BUN mg/dL 20 21   CREATININE mg/dL 0 69 0 71   EGFR ml/min/1 73sq m 88 87   CALCIUM mg/dL 9 2 9 1   MAGNESIUM mg/dL 2 2  --      Results from last 7 days   Lab Units 01/01/20  0532 12/31/19  0959   GLUCOSE RANDOM mg/dL 157* 125*     Results from last 7 days   Lab Units 12/31/19  0959   CK TOTAL U/L 61     Results from last 7 days   Lab Units 01/01/20  0525 12/31/19  0959   TROPONIN I ng/mL <0 03 <0 03     12/31  cxr=1  Diffuse interstitial opacities, increased from prior  Favor pulmonary edema    2   Small bilateral pleural effusions, also increased,  Ekg= afib with rvr  ED Treatment:   Medication Administration from 12/31/2019 0917 to 12/31/2019 1316       Date/Time Order Dose Route     12/31/2019 1051 furosemide (LASIX) tablet 20 mg 20 mg Oral     12/31/2019 1244 metoprolol tartrate (LOPRESSOR) tablet 50 mg 50 mg Oral        Past Medical History:   Diagnosis Date    Anxiety     Atrial fibrillation     Hyperlipidemia     Hypertension     Obesity      Present on Admission:   Essential hypertension   Persistent atrial fibrillation   Acute on chronic heart failure with preserved ejection fraction (HCC)   Leukemoid reaction   Morbid obesity (HCC)   Mixed dyslipidemia   [facility] depressive disorder in full [facility] ([facility] )   Hyperglycemia  Admitting Diagnosis: Atrial fibrillation ([facility]) [I48 91]  Shortness of breath [R06 02]  CHF (congestive heart failure) ([facility]) [I50 9]  SOB (shortness of breath) [R06 02]  Acute on chronic heart failure with preserved ejection fraction ([facility]) [I50 33]  Age/Sex: 78 y o  female  Admission Orders:  Telemetry  Consult nutrition  Consult cardio  venodynes  1500cc fluid restriction  Scheduled Medications:  apixaban 5 mg Oral BID   atorvastatin 40 mg Oral Daily With Dinner   diltiazem 360 mg Oral Daily   escitalopram 20 mg Oral Daily   furosemide 40 mg Intravenous BID   levalbuterol 1 25 mg Nebulization TID   lisinopril 5 mg Oral Daily   metoprolol tartrate 50 mg Oral Q12H [address]   sodium chloride 3 mL Nebulization TID     PRN Meds:  acetaminophen 650 mg Oral Q4H PRN   albuterol 2 5 mg Nebulization Q4H PRN   hydrocodone-chlorpheniramine polistirex 5 mL Oral Q12H PRN   metoprolol 5 mg Intravenous Q6H PRN   ondansetron 4 mg Intravenous Q6H PRN     Network Utilization Review Department  [email or web address]  ATTENTION: Please call with any questions or concerns to [phone] and carefully listen to the prompts so that you are directed to the right person   All voicemails are confidential   Mary Kaiden all requests for admission clinical reviews, approved or denied determinations and any other requests to dedicated fax number below belonging to the campus where the patient is receiving treatment   List of dedicated fax numbers for the Facilities:  [address] DENIALS (Administrative/Medical Necessity) [phone]   [address] (Maternity/NICU/Pediatrics) [phone]   [facility] [phone]   [facility] [phone]   Issa Gloria [phone]   Karla Katlyn [phone]   [address] [facility] [address] [facility] [phone]   Eben Kartik [phone]   [address]  [address] [facility] [address] [facility] [phone]

## 2020-01-02 LAB
ANION GAP SERPL CALCULATED.3IONS-SCNC: 6 MMOL/L (ref 4–13)
BASOPHILS # BLD AUTO: 0 THOUSANDS/ΜL (ref 0–0.1)
BASOPHILS NFR BLD AUTO: 0 % (ref 0–2)
BUN SERPL-MCNC: 25 MG/DL (ref 7–25)
CALCIUM SERPL-MCNC: 8.8 MG/DL (ref 8.6–10.5)
CHLORIDE SERPL-SCNC: 102 MMOL/L (ref 98–107)
CO2 SERPL-SCNC: 36 MMOL/L (ref 21–31)
CREAT SERPL-MCNC: 0.95 MG/DL (ref 0.6–1.2)
EOSINOPHIL # BLD AUTO: 0.1 THOUSAND/ΜL (ref 0–0.61)
EOSINOPHIL NFR BLD AUTO: 1 % (ref 0–5)
ERYTHROCYTE [DISTWIDTH] IN BLOOD BY AUTOMATED COUNT: 14.7 % (ref 11.5–14.5)
GFR SERPL CREATININE-BSD FRML MDRD: 61 ML/MIN/1.73SQ M
GLUCOSE SERPL-MCNC: 109 MG/DL (ref 65–99)
HCT VFR BLD AUTO: 37 % (ref 42–47)
HGB BLD-MCNC: 11.8 G/DL (ref 12–16)
LYMPHOCYTES # BLD AUTO: 1.8 THOUSANDS/ΜL (ref 0.6–4.47)
LYMPHOCYTES NFR BLD AUTO: 17 % (ref 21–51)
MCH RBC QN AUTO: 28.3 PG (ref 26–34)
MCHC RBC AUTO-ENTMCNC: 32 G/DL (ref 31–37)
MCV RBC AUTO: 88 FL (ref 81–99)
MONOCYTES # BLD AUTO: 1.3 THOUSAND/ΜL (ref 0.17–1.22)
MONOCYTES NFR BLD AUTO: 12 % (ref 2–12)
NEUTROPHILS # BLD AUTO: 7.5 THOUSANDS/ΜL (ref 1.4–6.5)
NEUTS SEG NFR BLD AUTO: 70 % (ref 42–75)
PLATELET # BLD AUTO: 312 THOUSANDS/UL (ref 149–390)
PMV BLD AUTO: 8 FL (ref 8.6–11.7)
POTASSIUM SERPL-SCNC: 3.5 MMOL/L (ref 3.5–5.5)
RBC # BLD AUTO: 4.18 MILLION/UL (ref 3.9–5.2)
SODIUM SERPL-SCNC: 144 MMOL/L (ref 134–143)
WBC # BLD AUTO: 10.7 THOUSAND/UL (ref 4.8–10.8)

## 2020-01-02 PROCEDURE — 99232 SBSQ HOSP IP/OBS MODERATE 35: CPT | Performed by: HOSPITALIST

## 2020-01-02 PROCEDURE — 85025 COMPLETE CBC W/AUTO DIFF WBC: CPT | Performed by: HOSPITALIST

## 2020-01-02 PROCEDURE — 94760 N-INVAS EAR/PLS OXIMETRY 1: CPT

## 2020-01-02 PROCEDURE — 80048 BASIC METABOLIC PNL TOTAL CA: CPT | Performed by: HOSPITALIST

## 2020-01-02 PROCEDURE — 94664 DEMO&/EVAL PT USE INHALER: CPT

## 2020-01-02 PROCEDURE — 99232 SBSQ HOSP IP/OBS MODERATE 35: CPT | Performed by: PHYSICIAN ASSISTANT

## 2020-01-02 RX ORDER — METOPROLOL TARTRATE 50 MG/1
50 TABLET, FILM COATED ORAL 3 TIMES DAILY
Status: DISCONTINUED | OUTPATIENT
Start: 2020-01-02 | End: 2020-01-04 | Stop reason: HOSPADM

## 2020-01-02 RX ADMIN — APIXABAN 5 MG: 5 TABLET, FILM COATED ORAL at 17:57

## 2020-01-02 RX ADMIN — APIXABAN 5 MG: 5 TABLET, FILM COATED ORAL at 09:14

## 2020-01-02 RX ADMIN — ATORVASTATIN CALCIUM 40 MG: 40 TABLET, FILM COATED ORAL at 16:39

## 2020-01-02 RX ADMIN — METOPROLOL TARTRATE 50 MG: 50 TABLET, FILM COATED ORAL at 21:58

## 2020-01-02 RX ADMIN — LISINOPRIL 5 MG: 5 TABLET ORAL at 09:14

## 2020-01-02 RX ADMIN — METOPROLOL TARTRATE 50 MG: 50 TABLET, FILM COATED ORAL at 09:14

## 2020-01-02 RX ADMIN — METOPROLOL TARTRATE 50 MG: 50 TABLET, FILM COATED ORAL at 16:39

## 2020-01-02 RX ADMIN — FUROSEMIDE 40 MG: 10 INJECTION, SOLUTION INTRAMUSCULAR; INTRAVENOUS at 17:57

## 2020-01-02 RX ADMIN — DILTIAZEM HYDROCHLORIDE 360 MG: 180 CAPSULE, COATED, EXTENDED RELEASE ORAL at 09:14

## 2020-01-02 RX ADMIN — FUROSEMIDE 40 MG: 10 INJECTION, SOLUTION INTRAMUSCULAR; INTRAVENOUS at 09:14

## 2020-01-02 NOTE — SOCIAL WORK
CM met with pt at bedside and explained role  Pt reports she lives alone in a 1 story house; 2 AZAM  Pt reilly snot use an assistive device to ambulate  She has RW and cane at home if needed  No other DME use reported  Pt reports she is completely independent with ADLs  She still works full time and drives  Pt PCP is Dr Tayla Saxena  She uses [employer] for medications and denies any barriers to obtaining them  Pt denies any history of STR, HHC, MH and D&A treatment  Pt denies any discharge needs at this time  She indicates family will transport her home on discharge  CM will continue to follow  CM reviewed d/c planning process including the following: identifying help at home, patient preference for d/c planning needs, availability of treatment team to discuss questions or concerns patient and/or family may have regarding understanding medications and recognizing signs and symptoms once discharged  CM also encouraged patient to follow up with all recommended appointments after discharge  Patient advised of importance for patient and family to participate in managing patients medical well being

## 2020-01-02 NOTE — NURSING NOTE
Pt alert and oriented, pleasant and cooperative  Heart is afib, currently rate on telemetry between 80 and 115  Pt aware of plan of care  Hourly rounding reviewed, no needs at this time  Call bell within reach

## 2020-01-02 NOTE — PLAN OF CARE
Problem: Potential for Falls  Goal: Patient will remain free of falls  Description  INTERVENTIONS:  - Assess patient frequently for physical needs  -  Identify cognitive and physical deficits and behaviors that affect risk of falls  -  [place] fall precautions as indicated by assessment   - Educate patient/family on patient safety including physical limitations  - Instruct patient to call for assistance with activity based on assessment  - Modify environment to reduce risk of injury  - Consider OT/PT consult to assist with strengthening/mobility  Outcome: Progressing     Problem: Prexisting or High Potential for Compromised Skin Integrity  Goal: Skin integrity is maintained or improved  Description  INTERVENTIONS:  - Identify patients at risk for skin breakdown  - Assess and monitor skin integrity  - Assess and monitor nutrition and hydration status  - Monitor labs   - Assess for incontinence   - Assist with mobility/ambulation  - Relieve pressure over bony prominences  - Avoid friction and shearing  - Provide appropriate hygiene as needed including keeping skin clean and dry  - Evaluate need for skin moisturizer/barrier cream  - Collaborate with interdisciplinary team   - Patient/family teaching     Outcome: Progressing     Problem: SAFETY ADULT  Goal: Patient will remain free of falls  Description  INTERVENTIONS:  - Assess patient frequently for physical needs  -  Identify cognitive and physical deficits and behaviors that affect risk of falls    -  [place] fall precautions as indicated by assessment   - Educate patient/family on patient safety including physical limitations  - Instruct patient to call for assistance with activity based on assessment  - Modify environment to reduce risk of injury  - Consider OT/PT consult to assist with strengthening/mobility  Outcome: Progressing     Problem: DISCHARGE PLANNING  Goal: Discharge to home or other facility with appropriate resources  Description  INTERVENTIONS:  - Identify barriers to discharge w/patient and caregiver  - Arrange for needed discharge resources and transportation as appropriate  - Identify discharge learning needs (meds, wound care, etc )  - Refer to Case Management Department for coordinating discharge planning if the patient needs post-hospital services based on physician/advanced practitioner order or complex needs related to functional status, cognitive ability, or social support system   Outcome: Progressing     Problem: Knowledge Deficit  Goal: Patient/family/caregiver demonstrates understanding of disease process, treatment plan, medications, and discharge instructions  Description  Complete learning assessment and assess knowledge base    Interventions:  - Provide teaching at level of understanding  - Provide teaching via preferred learning methods  Outcome: Progressing     Problem: CARDIOVASCULAR - ADULT  Goal: Maintains optimal cardiac output and hemodynamic stability  Description  INTERVENTIONS:  - Monitor I/O, vital signs and rhythm  - Monitor for S/S and trends of decreased cardiac output  - Assess quality of pulses, skin color and temperature  - Assess for signs of decreased coronary artery perfusion  - Instruct patient to report change in severity of symptoms   Outcome: Progressing     Problem: RESPIRATORY - ADULT  Goal: Achieves optimal ventilation and oxygenation  Description  INTERVENTIONS:  - Assess for changes in respiratory status  - Assess for changes in mentation and behavior  - Position to facilitate oxygenation and minimize respiratory effort  - Oxygen administered by appropriate delivery if ordered  - Encourage broncho-pulmonary hygiene including cough, deep breathe, Incentive Spirometry  - Assess and instruct to report SOB or any respiratory difficulty  - Respiratory Therapy support as indicated   Outcome: Progressing

## 2020-01-02 NOTE — NURSING NOTE
Pt resting in bedside chair, encouraged to ambulate however pt refuses RT shortness of breath on exertion, no respiratory distress observed, moist non productive cough, small amounts thin yellow sputum expectorated, inspiratory and expiratory wheezes, and rhonci auscultated throughout all lungs fields, pt remains on 2L O2 nc  Pt works and is independent at home, pt encouraged to use bathroom pt continues to refuse to ambulate  Denies pain  Afib on monitor with heart rate ranging 100-140  Cardiology at bedside  Staff reminded to use standing scale  Personal needs and call bell within reach, will continue to monitor  Denies needs

## 2020-01-02 NOTE — PROGRESS NOTES
Progress Note - Jimmy Rangel 1950, 78 y o  female MRN: [id number]    Unit/Bed#: -01 Encounter: [id number]    Primary Care Provider: Boy Lim MD   Date and time admitted to hospital: 12/31/2019  9:24 AM        * Acute on chronic [facility]  Assessment & Plan  Wt Readings from Last 3 Encounters:   01/02/20 125 kg (276 lb 0 3 oz)   12/20/19 130 kg (285 lb 11 5 oz)   09/12/19 115 kg (254 lb)     Patient continues to lose weight, has lost about 6-7 lb thus far, has a little bit more way to go  Continue Lasix IV b i d  Case reviewed with Cardiology  Continue diuresis for today  Continue other current cardiac based medications which include apixaban, diltiazem, metoprolol, lisinopril and Lipitor      Persistent atrial fibrillation  Assessment & Plan  · Patient remains slightly tachycardic  · Continue Cardizem 360 mg daily p o  · Metoprolol tartrate adjusted to 3 times daily at 50 mg  · Continue p r n  Metoprolol 5 mg every 6 hours for heart rate greater than 130  · Hopeful discharge planning times 24-48 hours    Essential hypertension  Assessment & Plan  · Continue meds and monitor    Mixed dyslipidemia  Assessment & Plan  · Continue statin    Morbid obesity ([facility] )  Assessment & Plan  · BMI calculated 50 48  · Weight loss recommended  · Follow-up dietitian as an outpatient    Major depressive disorder in full remission ([facility] )  Assessment & Plan  · Continue home medications    Leukemoid reaction  Assessment & Plan  · Reactive, leukocytosis resolved  · Check a procalcitonin level  · No fevers, chest x-ray findings reviewed, i clinically correlate this more with pulmonary edema  · No antibiotics needed    Hyperglycemia  Assessment & Plan  · A1c is 5 5%  · No further testing required        VTE Pharmacologic Prophylaxis: Pharmacologic: Apixaban (Eliquis)    Patient Centered Rounds: I have performed bedside rounds with nursing staff today      Discussions with Specialists or Other Care Team Provider:  Cardiology, case management, nursing and pharmacy  Education and Discussions with Family / Patient:  Patient was brought up to par with the plan of care for today, all questions answered to her satisfaction    Current Length of Stay: 2 day(s)    Current Patient Status: Inpatient   Certification Statement: The patient will continue to require additional inpatient hospital stay due to The need continued IV diuretics    Discharge Plan:  Hopeful discharge planning 24-48 hours    Code Status: Level 1 - Full Code    Subjective:   Patient seen and examined, no new complaints no distress  Feels a lot better and feels a lot lighter    Objective:     Vitals:   Temp (24hrs), Av 5 °F (36 4 °C), Min:96 6 °F (35 9 °C), Max:98 5 °F (36 9 °C)    Temp:  [96 6 °F (35 9 °C)-98 5 °F (36 9 °C)] 97 9 °F (36 6 °C)  HR:  [] 104  Resp:  [18-21] 21  BP: (115-136)/(54-85) 136/80  SpO2:  [90 %-95 %] 94 %  Body mass index is 50 48 kg/m²  Input and Output Summary (last 24 hours): Intake/Output Summary (Last 24 hours) at 2020 1656  Last data filed at 2020 1200  Gross per 24 hour   Intake 400 ml   Output 2700 ml   Net -2300 ml       Physical Exam:     Physical Exam   Constitutional: She is oriented to person, place, and time  She appears well-developed and well-nourished  HENT:   Head: Normocephalic and atraumatic  Nose: Nose normal    Mouth/Throat: Oropharynx is clear and moist    Eyes: Pupils are equal, round, and reactive to light  Conjunctivae and EOM are normal    Neck: Normal range of motion  Neck supple  No JVD present  No thyromegaly present  Cardiovascular: Intact distal pulses  Exam reveals no gallop and no friction rub  No murmur heard  S1 plus S2, irregularly irregular, tachycardic   Pulmonary/Chest: Effort normal and breath sounds normal  No respiratory distress  Abdominal: Soft  Bowel sounds are normal  She exhibits no distension and no mass   There is no tenderness  There is no guarding  Musculoskeletal: Normal range of motion  She exhibits no edema  Lymphadenopathy:     She has no cervical adenopathy  Neurological: She is alert and oriented to person, place, and time  No cranial nerve deficit  Skin: Skin is warm  No rash noted  No erythema  Psychiatric: She has a normal mood and affect  Her behavior is normal    Vitals reviewed  Additional Data:     Labs:    Results from last 7 days   Lab Units 01/02/20  0502   WBC Thousand/uL 10 70   HEMOGLOBIN g/dL 11 8*   HEMATOCRIT % 37 0*   PLATELETS Thousands/uL 312   NEUTROS PCT % 70   LYMPHS PCT % 17*   MONOS PCT % 12   EOS PCT % 1     Results from last 7 days   Lab Units 01/02/20  0502   POTASSIUM mmol/L 3 5   CHLORIDE mmol/L 102   CO2 mmol/L 36*   BUN mg/dL 25   CREATININE mg/dL 0 95   CALCIUM mg/dL 8 8             Results from last 7 days   Lab Units 01/01/20  0525   HEMOGLOBIN A1C % 5 5       * I Have Reviewed All Lab Data Listed Above  * Additional Pertinent Lab Tests Reviewed:  G[facility] Admission  Reviewed    Imaging:  Imaging Reports Reviewed Today Include:  None    Recent Cultures (last 7 days):           Last 24 Hours Medication List:     Current Facility-Administered Medications:  acetaminophen 650 mg Oral Q4H PRN Emilio Wolff, PA-C   apixaban 5 mg Oral BID Emilio Wolff, PA-C   atorvastatin 40 mg Oral Daily With [employer], PA-C   diltiazem 360 mg Oral Daily Emilio Wolff, PA-C   escitalopram 20 mg Oral Daily Emilio Wolff, PA-C   furosemide 40 mg Intravenous BID Emilio Wolff, PA-C   hydrocodone-chlorpheniramine polistirex 5 mL Oral Q12H PRN YRIS [place]-RAE   levalbuterol 1 25 mg Nebulization Q6H PRN Philippe Gastelum MD   lisinopril 5 mg Oral Daily Emilio Wolff, PA-C   metoprolol 5 mg Intravenous Q6H PRN Emilio Wolff, YANY   metoprolol tartrate 50 mg Oral TID [facility], PA-RAE   ondansetron 4 mg Intravenous Q6H PRN Noelle Del Cid PA-C   sodium chloride 3 mL Nebulization Q6H PRN Michael Green MD        Today, Patient Was Seen By: Michael Green MD    ** Please Note: Dictation voice to text software may have been used in the creation of this document   **

## 2020-01-02 NOTE — NUTRITION
01/02/20 1153   Assessment   Timepoint Initial  (consult)   Labs   List Completed Labs Hematocrit; Hemoglobin; Other (Comment)  (1/2/20 Na 144 HGB 11 8 HCT 37 0 HGBA1C 5 5% 1/1/20  meds reviewed lasix lexapro lipitor)   Feeding Route   PO Independent   Adequacy of Intake   Nutrition Modality PO  (cardiac 1500 mL fluid restriction)   Intake Meals %   Estimated Calorie Intake %   Estimated Protein Intake  %   Estimated Fluid Intake %   Nutrition Prognosis   Nutrition Concerns Other (Comment)  (per RN documentation patient has non-pitting LE edema both legs, skin WDL)   Comorbid Concerns Other (Comment)  (CHF afib HLD HTN)   Nutrition Precautions None   Nutrition Considerations Other (Comment)  (patient accepting of CHF nutrition recommendations)   PES Statement   Problem Clinical   Weight (3) Overweight/obesity NC-3 3   Related to Energy intake > Energy output over time   As evidenced by: BMI   Additional PES needed? Yes   PES Statement 2   Problem Intake   Related to Unsupported beliefs/Attitudes about nutrition   As evidenced by Per patient interview   Mineral (5 10) Excessive mineral intake (specify) NI-5 10 2   Specify mineral Sodium (7)   Patient Nutrition Goals   Goal avoid weight gain;comprehend education   Goal Status initiated   Timeframe to complete goal by next f/u   Recommendations/Interventions   Summary Patient receiving cardiac diet 1500 mL fluid restriction  Intakes %  Patient has siginificant wt  gain 23lb/9 0% in 3 months, likely due to fluid status  BMI is 50 48 obese class 3  Patient receptive to CHF nutrition recommendations  Continue diet as ordered     Malnutrition/BMI Present Yes  (BMI 50 48 obesity noted in hospital problem list)   BMI Classifications Morbid Obesity 50-59 9   Interventions Diet: continued as ordered;Education initiated/completed   Nutrition Recommendations Continue diet as ordered   Nutrition Complexity Risk   Nutrition complexity level Low risk Follow up date 01/10/20

## 2020-01-02 NOTE — ASSESSMENT & PLAN NOTE
· Patient remains slightly tachycardic  · Continue Cardizem 360 mg daily p o  · Metoprolol tartrate adjusted to 3 times daily at 50 mg  · Continue p r n   Metoprolol 5 mg every 6 hours for heart rate greater than 130  · Hopeful discharge planning times 24-48 hours

## 2020-01-02 NOTE — PLAN OF CARE
Problem: Potential for Falls  Goal: Patient will remain free of falls  Description  INTERVENTIONS:  - Assess patient frequently for physical needs  -  Identify cognitive and physical deficits and behaviors that affect risk of falls  -  [place] fall precautions as indicated by assessment   - Educate patient/family on patient safety including physical limitations  - Instruct patient to call for assistance with activity based on assessment  - Modify environment to reduce risk of injury  - Consider OT/PT consult to assist with strengthening/mobility  Outcome: Progressing     Problem: Prexisting or High Potential for Compromised Skin Integrity  Goal: Skin integrity is maintained or improved  Description  INTERVENTIONS:  - Identify patients at risk for skin breakdown  - Assess and monitor skin integrity  - Assess and monitor nutrition and hydration status  - Monitor labs   - Assess for incontinence   - Assist with mobility/ambulation  - Relieve pressure over bony prominences  - Avoid friction and shearing  - Provide appropriate hygiene as needed including keeping skin clean and dry  - Evaluate need for skin moisturizer/barrier cream  - Collaborate with interdisciplinary team   - Patient/family teaching     Outcome: Progressing     Problem: SAFETY ADULT  Goal: Patient will remain free of falls  Description  INTERVENTIONS:  - Assess patient frequently for physical needs  -  Identify cognitive and physical deficits and behaviors that affect risk of falls    -  [place] fall precautions as indicated by assessment   - Educate patient/family on patient safety including physical limitations  - Instruct patient to call for assistance with activity based on assessment  - Modify environment to reduce risk of injury  - Consider OT/PT consult to assist with strengthening/mobility  Outcome: Progressing     Problem: DISCHARGE PLANNING  Goal: Discharge to home or other facility with appropriate resources  Description  INTERVENTIONS:  - Identify barriers to discharge w/patient and caregiver  - Arrange for needed discharge resources and transportation as appropriate  - Identify discharge learning needs (meds, wound care, etc )  - Refer to Case Management Department for coordinating discharge planning if the patient needs post-hospital services based on physician/advanced practitioner order or complex needs related to functional status, cognitive ability, or social support system   Outcome: Progressing     Problem: Knowledge Deficit  Goal: Patient/family/caregiver demonstrates understanding of disease process, treatment plan, medications, and discharge instructions  Description  Complete learning assessment and assess knowledge base    Interventions:  - Provide teaching at level of understanding  - Provide teaching via preferred learning methods  Outcome: Progressing     Problem: CARDIOVASCULAR - ADULT  Goal: Maintains optimal cardiac output and hemodynamic stability  Description  INTERVENTIONS:  - Monitor I/O, vital signs and rhythm  - Monitor for S/S and trends of decreased cardiac output  - Assess quality of pulses, skin color and temperature  - Assess for signs of decreased coronary artery perfusion  - Instruct patient to report change in severity of symptoms   Outcome: Progressing     Problem: RESPIRATORY - ADULT  Goal: Achieves optimal ventilation and oxygenation  Description  INTERVENTIONS:  - Assess for changes in respiratory status  - Assess for changes in mentation and behavior  - Position to facilitate oxygenation and minimize respiratory effort  - Oxygen administered by appropriate delivery if ordered  - Encourage broncho-pulmonary hygiene including cough, deep breathe, Incentive Spirometry  - Assess and instruct to report SOB or any respiratory difficulty  - Respiratory Therapy support as indicated   Outcome: Progressing

## 2020-01-02 NOTE — ASSESSMENT & PLAN NOTE
Wt Readings from Last 3 Encounters:   01/02/20 125 kg (276 lb 0 3 oz)   12/20/19 130 kg (285 lb 11 5 oz)   09/12/19 115 kg (254 lb)     Patient continues to lose weight, has lost about 6-7 lb thus far, has a little bit more way to go  Continue Lasix IV b i d    Case reviewed with Cardiology  Continue diuresis for today  Continue other current cardiac based medications which include apixaban, diltiazem, metoprolol, lisinopril and Lipitor

## 2020-01-03 PROCEDURE — 94760 N-INVAS EAR/PLS OXIMETRY 1: CPT

## 2020-01-03 PROCEDURE — 99232 SBSQ HOSP IP/OBS MODERATE 35: CPT | Performed by: HOSPITALIST

## 2020-01-03 PROCEDURE — 99232 SBSQ HOSP IP/OBS MODERATE 35: CPT | Performed by: PHYSICIAN ASSISTANT

## 2020-01-03 PROCEDURE — 94761 N-INVAS EAR/PLS OXIMETRY MLT: CPT

## 2020-01-03 RX ORDER — FUROSEMIDE 40 MG/1
40 TABLET ORAL
Status: DISCONTINUED | OUTPATIENT
Start: 2020-01-04 | End: 2020-01-04 | Stop reason: HOSPADM

## 2020-01-03 RX ADMIN — METOPROLOL TARTRATE 50 MG: 50 TABLET, FILM COATED ORAL at 23:59

## 2020-01-03 RX ADMIN — ESCITALOPRAM OXALATE 20 MG: 10 TABLET ORAL at 09:42

## 2020-01-03 RX ADMIN — FUROSEMIDE 40 MG: 10 INJECTION, SOLUTION INTRAMUSCULAR; INTRAVENOUS at 09:42

## 2020-01-03 RX ADMIN — APIXABAN 5 MG: 5 TABLET, FILM COATED ORAL at 09:42

## 2020-01-03 RX ADMIN — APIXABAN 5 MG: 5 TABLET, FILM COATED ORAL at 17:55

## 2020-01-03 RX ADMIN — DILTIAZEM HYDROCHLORIDE 360 MG: 180 CAPSULE, COATED, EXTENDED RELEASE ORAL at 09:42

## 2020-01-03 RX ADMIN — METOPROLOL TARTRATE 50 MG: 50 TABLET, FILM COATED ORAL at 09:42

## 2020-01-03 RX ADMIN — METOPROLOL TARTRATE 50 MG: 50 TABLET, FILM COATED ORAL at 17:55

## 2020-01-03 RX ADMIN — ATORVASTATIN CALCIUM 40 MG: 40 TABLET, FILM COATED ORAL at 17:55

## 2020-01-03 RX ADMIN — LISINOPRIL 5 MG: 5 TABLET ORAL at 09:42

## 2020-01-03 RX ADMIN — HYDROCODONE POLISTIREX AND CHLORPHENIRAMINE POLISTIREX 5 ML: 10; 8 SUSPENSION, EXTENDED RELEASE ORAL at 20:16

## 2020-01-03 NOTE — PROGRESS NOTES
Progress Note - Nita Constant 1950, 78 y o  female MRN: [id number]    Unit/Bed#: -01 Encounter: [id number]    Primary Care Provider: Lynne Trujillo MD   Date and time admitted to hospital: 12/31/2019  9:24 AM        * Acute on chronic heart failure with preserved ejection fraction (HCC)  Assessment & Plan  Wt Readings from Last 3 Encounters:   01/02/20 125 kg (276 lb 0 3 oz)   12/20/19 130 kg (285 lb 11 5 oz)   09/12/19 115 kg (254 lb)     Patient has loss about 6-7 lb thus far  Patient has plateaued out from a weight standpoint  Change Lasix to 40 mg daily p o , of note, prior to arrival patient was on only 20 mg of Lasix daily by mouth  Continue other current cardiac based medications which include apixaban, diltiazem, metoprolol, lisinopril and Lipitor   Patient was weaned off of her supplemental oxygen  Will request for an exercise desat study  If all remains will discharge planning for tomorrow morning      Persistent atrial fibrillation  Assessment & Plan  · Heart rate is better controlled  · Continue diltiazem 360 mg daily by mouth  · Continue metoprolol tartrate 50 mg t i d  P o    · Hopeful discharge planning on this exact regimen for tomorrow    Essential hypertension  Assessment & Plan  · Continue meds and monitor    Mixed dyslipidemia  Assessment & Plan  · Continue statin    Morbid [facility] ([facility] )  Assessment & Plan  · BMI calculated 50 48  · Weight loss recommended  · Follow-up dietitian as an outpatient    [facility]  Assessment & Plan  · Continue home medications    Leukemoid reaction  Assessment & Plan  · Reactive, leukocytosis resolved  · Procalcitonin testing normal  · No fevers, chest x-ray findings reviewed, i clinically correlate this more with pulmonary edema  · No antibiotics needed    Hyperglycemia  Assessment & Plan  · A1c is 5 5%  · No further testing required        VTE Pharmacologic Prophylaxis: Pharmacologic: Systemically anticoagulated with apixaban    Patient Centered Rounds: I have performed bedside rounds with nursing staff today  Discussions with Specialists or Other Care Team Provider:  Cardiology, case management, nursing and pharmacy  Education and Discussions with Family / Patient:  Patient was brought up to par with the plan of care for today, all questions answered to her satisfaction    Current Length of Stay: 3 day(s)    Current Patient Status: Inpatient   Certification Statement: The patient will continue to require additional inpatient hospital stay due to The need for continued diuresis as well as an exercise desaturation study    Discharge Plan:  Hopeful discharge planning for tomorrow morning    Code Status: Level 1 - Full Code    Subjective:   Patient seen and examined, no new complaints no distress  Feels better  Feels as if she needs 1 more night here in the hospital to get more fluid off  Patient is uncomfortable on at the thought of being discharged today    Objective:     Vitals:   Temp (24hrs), Av 6 °F (36 4 °C), Min:96 9 °F (36 1 °C), Max:98 4 °F (36 9 °C)    Temp:  [96 9 °F (36 1 °C)-98 4 °F (36 9 °C)] 97 5 °F (36 4 °C)  HR:  [] 100  Resp:  [17-21] 18  BP: (119-149)/(59-95) 119/59  SpO2:  [91 %-96 %] 92 %  Body mass index is 50 48 kg/m²  Input and Output Summary (last 24 hours): Intake/Output Summary (Last 24 hours) at 1/3/2020 1556  Last data filed at 1/3/2020 1400  Gross per 24 hour   Intake 560 ml   Output 4500 ml   Net -3940 ml       Physical Exam:     Physical Exam   Constitutional: She is oriented to person, place, and time  She appears well-developed and well-nourished  HENT:   Head: Normocephalic and atraumatic  Nose: Nose normal    Mouth/Throat: Oropharynx is clear and moist    Eyes: Pupils are equal, round, and reactive to light  Conjunctivae and EOM are normal    Neck: Normal range of motion  Neck supple  No JVD present  No thyromegaly present     Cardiovascular: Normal rate, regular rhythm and intact distal pulses  Exam reveals no gallop and no friction rub  No murmur heard  Pulmonary/Chest: Effort normal and breath sounds normal  No respiratory distress  Abdominal: Soft  Bowel sounds are normal  She exhibits no distension and no mass  There is no tenderness  There is no guarding  Musculoskeletal: Normal range of motion  She exhibits no edema  Improved but chronic 2+ lower extremity edema persists   Lymphadenopathy:     She has no cervical adenopathy  Neurological: She is alert and oriented to person, place, and time  No cranial nerve deficit  Skin: Skin is warm  No rash noted  No erythema  Psychiatric: She has a normal mood and affect  Her behavior is normal    Vitals reviewed  Additional Data:     Labs:    Results from last 7 days   Lab Units 01/02/20  0502   WBC Thousand/uL 10 70   HEMOGLOBIN g/dL 11 8*   HEMATOCRIT % 37 0*   PLATELETS Thousands/uL 312   NEUTROS PCT % 70   LYMPHS PCT % 17*   MONOS PCT % 12   EOS PCT % 1     Results from last 7 days   Lab Units 01/02/20  0502   POTASSIUM mmol/L 3 5   CHLORIDE mmol/L 102   CO2 mmol/L 36*   BUN mg/dL 25   CREATININE mg/dL 0 95   CALCIUM mg/dL 8 8             Results from last 7 days   Lab Units 01/01/20  0525   HEMOGLOBIN A1C % 5 5       * I Have Reviewed All Lab Data Listed Above  * Additional Pertinent Lab Tests Reviewed:  [facility] Admission  Reviewed    Imaging:  Imaging Reports Reviewed Today Include:  None    Recent Cultures (last 7 days):           Last 24 Hours Medication List:     Current Facility-Administered Medications:  acetaminophen 650 mg Oral Q4H PRN Yoana Schneider PA-C   apixaban 5 mg Oral BID Yoana Schneider PA-C   atorvastatin 40 mg Oral Daily With [employer]   diltiazem 360 mg Oral Daily Yoana Schneider PA-C   escitalopram 20 mg Oral Daily Yoana Schneider PA-C   [START ON 1/4/2020] furosemide 40 mg Oral Daily Tiffani Worthy MD hydrocodone-chlorpheniramine polistirex 5 mL Oral Q12H PRN Ilda YANY Amos   levalbuterol 1 25 mg Nebulization Q6H PRN Quinn Sharma MD   lisinopril 5 mg Oral Daily Ilda YANY Amos   metoprolol 5 mg Intravenous Q6H PRN Ilda YANY Amos   metoprolol tartrate 50 mg Oral TID Aileen Srinivasan PA-C   ondansetron 4 mg Intravenous Q6H PRN Ilda YANY Amos   sodium chloride 3 mL Nebulization Q6H PRN Quinn Sharma MD        Today, Patient Was Seen By: Quinn Sharma MD    ** Please Note: Dictation voice to text software may have been used in the creation of this document   **

## 2020-01-03 NOTE — SOCIAL WORK
Pt for possible discharge home today  Pt with LACE score 74 , pt wants to make her own follow up PCP appointment  Pt cannot be referred to OP CM as she does not have [facility] PCP  Pt still on 2L NC here but reilly snot have same at home  Pt reports she has ride home from family on discharge  She denies any discharge needs at this time  CM will continue to follow

## 2020-01-03 NOTE — NURSING NOTE
TELE MAINTAINED FOR AFIB/ RATE VARIES OF /MIN oxygen 2 LITERS   02 IS MAINTAINED 2 LITERS N/C  PURE WIC IS MAINTAINED PER PT REQUEST, FOR LRG URINE OUTPUT  PT IS ENCOURAGED TO GET OOB TO COMMODE  NO C/O ARE VOICED WITH ROUNDING

## 2020-01-03 NOTE — ASSESSMENT & PLAN NOTE
· Reactive, leukocytosis resolved  · Procalcitonin testing normal  · No fevers, chest x-ray findings reviewed, i clinically correlate this more with pulmonary edema  · No antibiotics needed

## 2020-01-03 NOTE — PROGRESS NOTES
Progress Note - Nacho Ann 1950, 78 y o  female MRN: [id number]    Unit/Bed#: -01 Encounter: [id number]    Primary Care Provider: Tylor Abarca MD   Date and time admitted to hospital: 12/31/2019  9:24 AM        ASSESSMENT/PLAN:   1  Acute on chronic diastolic HF   · Likely precipitated by afib with RVR   · Still appears volume overloaded, but improving  · No weight this morning, but she is down an additional 2L since yesterday (-4L since admission)  · Continue lasix 40mg IV BID - hope to transition to PO tomorrow  · Supplemental oxygen titrated down to 1L this morning  · Dry weight is around 170 lbs      2  pAF with RVR  · S/p SUSIE/DCCV 8/30/19   · Recent TSH normal  Normal LV function  · Rates slightly improved - 100-120s  Continue cardizem 360mg daily and lopressor 50mg TID  Will not increase AV eb blockade any further as she has been bradycardic in the past when she converts to sinus   · I have ordered outpatient Minoo Hunter and referral to EP  Sleep study pending  · Anticoagulated on Eliquis     3  HTN - continue lopressor and cardizem     4  HLD - on atorvastatin    SUBJECTIVE  HPI: The patient was seen and examined at the bedside  No events overnight  Dyspnea continues to improve- supplemental oxygen turned down to 1L this morning  She continues to have lower extremity edema  Telemetry: Afib, rates 100-120s     Most recent cardiac testing:   SUSIE 8/30/19 - EF 60%, mild left atrial enlargement  Probable tiny PFO  Trace MR/AI    Mild TR    Current medications:     Current Facility-Administered Medications:     acetaminophen (TYLENOL) tablet 650 mg, 650 mg, Oral, Q4H PRN, Perla Lopez PA-C    apixaban (ELIQUIS) tablet 5 mg, 5 mg, Oral, BID, Perla Lopez PA-C, 5 mg at 01/03/20 1741    atorvastatin (LIPITOR) tablet 40 mg, 40 mg, Oral, Daily With Dinner, Perla Lopez PA-C, 40 mg at 01/02/20 1639    diltiazem (CARDIZEM CD) 24 hr capsule 360 mg, 360 mg, Oral, Daily, Alondra Olivares YANY De La Paz, 360 mg at 01/03/20 0942    escitalopram (LEXAPRO) tablet 20 mg, 20 mg, Oral, Daily, Sahil Irby PA-C, 20 mg at 01/03/20 8786    furosemide (LASIX) injection 40 mg, 40 mg, Intravenous, BID, Sahil Irby PA-C, 40 mg at 01/03/20 1479    hydrocodone-chlorpheniramine polistirex (TUSSIONEX) 10-8 mg/5 mL ER suspension 5 mL, 5 mL, Oral, Q12H PRN, Sahil Irby PA-C    levalbuterol (XOPENEX) inhalation solution 1 25 mg, 1 25 mg, Nebulization, Q6H PRN, Ariana Capone MD    lisinopril (ZESTRIL) tablet 5 mg, 5 mg, Oral, Daily, Sahil Irby PA-C, 5 mg at 01/03/20 0942    metoprolol (LOPRESSOR) injection 5 mg, 5 mg, Intravenous, Q6H PRN, Sahil Irby PA-C, 5 mg at 01/01/20 1029    metoprolol tartrate (LOPRESSOR) tablet 50 mg, 50 mg, Oral, TID, Ilda YANY Peace, 50 mg at 01/03/20 0942    ondansetron (ZOFRAN) injection 4 mg, 4 mg, Intravenous, Q6H PRN, Sahil Irby PA-C    sodium chloride 0 9 % inhalation solution 3 mL, 3 mL, Nebulization, Q6H PRN, Ariana Capone MD     Allergies   Allergen Reactions    Other Itching     lobster    Penicillins Rash       OBJECTIVE  Vitals: Blood pressure 134/95, pulse 102, temperature 97 9 °F (36 6 °C), temperature source Temporal, resp  rate 18, height 5' 2" (1 575 m), weight 125 kg (276 lb 0 3 oz), SpO2 96 %  , Body mass index is 50 48 kg/m² ,   Orthostatic Blood Pressures      Most Recent Value   Blood Pressure  134/95 filed at 01/03/2020 0730   Patient Position - Orthostatic VS  Lying filed at 01/03/2020 0730          Physical Exam   Physical Exam   Constitutional: She is oriented to person, place, and time  She appears well-developed and well-nourished  No distress  HENT:   Head: Normocephalic and atraumatic  Eyes: EOM are normal  No scleral icterus  Neck: Normal range of motion  Neck supple  Cardiovascular: S1 normal and S2 normal  An irregularly irregular rhythm present  Tachycardia present     No murmur heard   Pulmonary/Chest: Effort normal and breath sounds normal  She has no wheezes  She has no rales  Abdominal: Soft  Bowel sounds are normal    Musculoskeletal: She exhibits edema (2+)  Neurological: She is alert and oriented to person, place, and time  Skin: Skin is warm and dry  Psychiatric: She has a normal mood and affect  Her behavior is normal    Nursing note and vitals reviewed        Lab Results:   Troponins:   Results from last 7 days   Lab Units 01/01/20  0948 01/01/20  0525 12/31/19  0959   CK TOTAL U/L  --   --  61   TROPONIN I ng/mL <0 03 <0 03 <0 03     BNP:  Results from last 6 Months   Lab Units 12/17/19  1244 08/26/19  0918   BNP pg/mL 354* 499*     CBC with diff:   Results from last 7 days   Lab Units 01/02/20  0502 01/01/20  0525 12/31/19  0959   WBC Thousand/uL 10 70 9 00 13 80*   HEMOGLOBIN g/dL 11 8* 12 0 12 0   HEMATOCRIT % 37 0* 37 1* 37 9*   PLATELETS Thousands/uL 312 260 254   RBC Million/uL 4 18 4 21 4 30     CMP:  Results from last 7 days   Lab Units 01/02/20  0502 01/01/20  0532 12/31/19  0959   POTASSIUM mmol/L 3 5 3 5 3 5   CHLORIDE mmol/L 102 100 104   CO2 mmol/L 36* 31 30   BUN mg/dL 25 20 21   CREATININE mg/dL 0 95 0 69 0 71   CALCIUM mg/dL 8 8 9 2 9 1   EGFR ml/min/1 73sq m 61 88 87     TSH:     Coags:

## 2020-01-03 NOTE — ASSESSMENT & PLAN NOTE
· Heart rate is better controlled  · Continue diltiazem 360 mg daily by mouth  · Continue metoprolol tartrate 50 mg t i d  P o    · Hopeful discharge planning on this exact regimen for tomorrow

## 2020-01-03 NOTE — PLAN OF CARE
Problem: CARDIOVASCULAR - ADULT  Goal: Maintains optimal cardiac output and hemodynamic stability  Description  INTERVENTIONS:  - Monitor I/O, vital signs and rhythm  - Monitor for S/S and trends of decreased cardiac output  - Assess quality of pulses, skin color and temperature  - Assess for signs of decreased coronary artery perfusion  - Instruct patient to report change in severity of symptoms   Outcome: Progressing

## 2020-01-03 NOTE — ASSESSMENT & PLAN NOTE
Wt Readings from Last 3 Encounters:   01/02/20 125 kg (276 lb 0 3 oz)   12/20/19 130 kg (285 lb 11 5 oz)   09/12/19 115 kg (254 lb)     Patient has loss about 6-7 lb thus far  Patient has plateaued out from a weight standpoint  Change Lasix to 40 mg daily p o , of note, prior to arrival patient was on only 20 mg of Lasix daily by mouth  Continue other current cardiac based medications which include apixaban, diltiazem, metoprolol, lisinopril and Lipitor   Patient was weaned off of her supplemental oxygen  Will request for an exercise desat study  If all remains will discharge planning for tomorrow morning

## 2020-01-03 NOTE — RESPIRATORY THERAPY NOTE
Home Oxygen Qualifying Test       Patient name: Otilio Horowitz        : 1950   Date of Test:  January 3, 2020  Diagnosis:      Home Oxygen Test:    **Medicare Guidelines require item(s) 1-5 on all ambulatory patients or 1 and 2 on non-ambulatory patients  1   Baseline SPO2 on Room Air at rest 91  %  2   SPO2 during exercise on Room Air 93  %  During exercise monitor SpO2  If SPO2 increases >=89% with ambulation do not add supplemental             oxygen  If <= 88% on room air add O2 via NC and titrate patient  Patient must be ambulated with O2 and titrated to > 88% with exertion  No O2 applied    3  SPO2 on Oxygen at rest  % lpm     4   SPO2 during exercise on Oxygen % a liter flow of lpm     5   Exercise performed:          Walking  Duration 10 minutes  Distance 100 feet          []  Supplemental Home Oxygen is indicated  [x]  Client does not qualify for home oxygen  Respiratory Additional Notes- Patient was ambulated 100 feet with a walker  Patient   Tolerated walk well   ANGELITA Jones, RT

## 2020-01-03 NOTE — PLAN OF CARE
Problem: Potential for Falls  Goal: Patient will remain free of falls  Description  INTERVENTIONS:  - Assess patient frequently for physical needs  -  Identify cognitive and physical deficits and behaviors that affect risk of falls    -  [place] fall precautions as indicated by assessment   - Educate patient/family on patient safety including physical limitations  - Instruct patient to call for assistance with activity based on assessment  - Modify environment to reduce risk of injury  - Consider OT/PT consult to assist with strengthening/mobility  Outcome: Progressing

## 2020-01-04 VITALS
HEART RATE: 105 BPM | OXYGEN SATURATION: 90 % | RESPIRATION RATE: 18 BRPM | DIASTOLIC BLOOD PRESSURE: 60 MMHG | WEIGHT: 267.31 LBS | HEIGHT: 62 IN | SYSTOLIC BLOOD PRESSURE: 123 MMHG | TEMPERATURE: 97.5 F | BODY MASS INDEX: 49.19 KG/M2

## 2020-01-04 LAB
ANION GAP SERPL CALCULATED.3IONS-SCNC: 7 MMOL/L (ref 4–13)
BUN SERPL-MCNC: 21 MG/DL (ref 7–25)
CALCIUM SERPL-MCNC: 8.8 MG/DL (ref 8.6–10.5)
CHLORIDE SERPL-SCNC: 101 MMOL/L (ref 98–107)
CO2 SERPL-SCNC: 35 MMOL/L (ref 21–31)
CREAT SERPL-MCNC: 0.8 MG/DL (ref 0.6–1.2)
GFR SERPL CREATININE-BSD FRML MDRD: 75 ML/MIN/1.73SQ M
GLUCOSE SERPL-MCNC: 95 MG/DL (ref 65–99)
POTASSIUM SERPL-SCNC: 3.2 MMOL/L (ref 3.5–5.5)
SODIUM SERPL-SCNC: 143 MMOL/L (ref 134–143)

## 2020-01-04 PROCEDURE — 99239 HOSP IP/OBS DSCHRG MGMT >30: CPT | Performed by: HOSPITALIST

## 2020-01-04 PROCEDURE — 80048 BASIC METABOLIC PNL TOTAL CA: CPT | Performed by: HOSPITALIST

## 2020-01-04 RX ORDER — POTASSIUM CHLORIDE 20 MEQ/1
40 TABLET, EXTENDED RELEASE ORAL ONCE
Status: COMPLETED | OUTPATIENT
Start: 2020-01-04 | End: 2020-01-04

## 2020-01-04 RX ORDER — FUROSEMIDE 40 MG/1
40 TABLET ORAL
Qty: 30 TABLET | Refills: 0 | Status: SHIPPED | OUTPATIENT
Start: 2020-01-05

## 2020-01-04 RX ORDER — METOPROLOL TARTRATE 50 MG/1
50 TABLET, FILM COATED ORAL 3 TIMES DAILY
Qty: 90 TABLET | Refills: 0 | Status: SHIPPED | OUTPATIENT
Start: 2020-01-04

## 2020-01-04 RX ADMIN — LISINOPRIL 5 MG: 5 TABLET ORAL at 08:49

## 2020-01-04 RX ADMIN — FUROSEMIDE 40 MG: 40 TABLET ORAL at 05:59

## 2020-01-04 RX ADMIN — HYDROCODONE POLISTIREX AND CHLORPHENIRAMINE POLISTIREX 5 ML: 10; 8 SUSPENSION, EXTENDED RELEASE ORAL at 09:19

## 2020-01-04 RX ADMIN — POTASSIUM CHLORIDE 40 MEQ: 1500 TABLET, EXTENDED RELEASE ORAL at 10:44

## 2020-01-04 RX ADMIN — ESCITALOPRAM OXALATE 20 MG: 10 TABLET ORAL at 08:48

## 2020-01-04 RX ADMIN — APIXABAN 5 MG: 5 TABLET, FILM COATED ORAL at 08:48

## 2020-01-04 RX ADMIN — DILTIAZEM HYDROCHLORIDE 360 MG: 180 CAPSULE, COATED, EXTENDED RELEASE ORAL at 08:49

## 2020-01-04 RX ADMIN — METOPROLOL TARTRATE 50 MG: 50 TABLET, FILM COATED ORAL at 08:48

## 2020-01-04 NOTE — ASSESSMENT & PLAN NOTE
Wt Readings from Last 3 Encounters:   01/04/20 121 kg (267 lb 4 9 oz)   12/20/19 130 kg (285 lb 11 5 oz)   09/12/19 115 kg (254 lb)     If the weights are accurate, the patient presented with a weight of 282 lb, she is diuresed 15 lb and her discharge weight today's 267 lb  She feels much better  DC home on 40 mg Lasix by mouth  Continue other current cardiac based medications which include apixaban, diltiazem, metoprolol, lisinopril and Lipitor   Status post an exercise desat study, she does not require home oxygen  Okay for discharge  Outpatient follow-up PCP and Cardiology

## 2020-01-04 NOTE — DISCHARGE SUMMARY
Discharge- Megan Howell 1950, 78 y o  female MRN: [id number]    Unit/Bed#: -01 Encounter: [id number]    Primary Care Provider: Binu Medina MD   Date and time admitted to hospital: 12/31/2019  9:24 AM        * Acute on chronic heart failure with preserved ejection fraction [facility])  Assessment & Plan  Wt Readings from Last 3 Encounters:   01/04/20 121 kg (267 lb 4 9 oz)   12/20/19 130 kg (285 lb 11 5 oz)   09/12/19 115 kg (254 lb)     If the weights are accurate, the patient presented with a weight of 282 lb, she is diuresed 15 lb and her discharge weight today's 267 lb  She feels much better  DC home on 40 mg Lasix by mouth  Continue other current cardiac based medications which include apixaban, diltiazem, metoprolol, lisinopril and Lipitor   Status post an exercise desat study, she does not require home oxygen  Okay for discharge  Outpatient follow-up PCP and Cardiology      Persistent atrial fibrillation  Assessment & Plan  · Heart rate is better controlled  · Continue diltiazem 360 mg daily by mouth  · Continue metoprolol tartrate 50 mg t i d  P o    · Anticoagulation with apixaban  · DC home on this regimen    Essential hypertension  Assessment & Plan  · DC home on pre-admit meds at pre-admit dosages    Mixed dyslipidemia  Assessment & Plan  · Continue statin    Morbid obesity ([facility] [address] )  Assessment & Plan  · BMI calculated 50 48  · Weight loss recommended  · Follow-up dietitian as an outpatient    Major depressive disorder in full remission ([facility] [address] )  Assessment & Plan  · Continue home medications    Leukemoid reaction  Assessment & Plan  · Reactive, leukocytosis resolved  · Procalcitonin testing normal  · No fevers, chest x-ray findings reviewed, i clinically correlate this more with pulmonary edema  · No antibiotics needed    Hyperglycemia  Assessment & Plan  · A1c is 5 5%  · No further testing required          Discharging Physician / Practitioner: Garo Conroy MD  PCP: Binu Medina MD  Admission Date:   Admission Orders (From admission, onward)     Ordered        12/31/19 1233  Inpatient Admission (expected length of stay for this patient Order details is greater than two midnights)  Once                   Discharge Date: 01/04/20    Resolved Problems  Date Reviewed: 12/31/2019    None          Consultations During Hospital Stay:  · Cardiology    Procedures Performed:   · None    Significant Findings / Test Results:   · Chest x-ray-diffuse interstitial opacities, increased from prior  Favor pulmonary edema  Small bilateral pleural effusions also increased  Incidental Findings:   · None     Test Results Pending at Discharge (will require follow up): · None     Outpatient Tests Requested:  · None    Complications:     None    Reason for Admission:  Acute on chronic diastolic dysfunction CHF exacerbation    Hospital Course:     Jimmy Rangel is a 78 y o  female patient who originally presented to the hospital on 12/31/2019 due to shortness of breath  Please refer to the initial history and physical examination completed by Lisa Real for the initial presenting features and complaints  In brief the patient is a 71-year-old female well known to the medical personal here at this facility because of recurrent admissions related to cardiac pathology  She was admitted this time with shortness of breath  She was diagnosed with an acute on chronic diastolic dysfunction CHF exacerbation  She was admitted to [facility] with telemetry  She was started on IV diuretics in the form of Lasix at 40 mg IV b i d   Of note at home she was only on 20 mg of Lasix daily by mouth  Over the 4 days she was here she diuresed very well  Her arrival weight was 282 lb, her discharge weight was 267 lb  She was seen by Cardiology  Additionally her antiarrhythmics were optimized yet once again    She came in on metoprolol tartrate 50 mg p o  B i d , after being seen by Cardiology this was increased to metoprolol tartrate 50 mg p o  T i d  She was counseled extensively on weight loss, dietary and lifestyle modifications, monitoring daily weights, input and output monitoring counseling was also provided she was deemed stable for discharge on 01/04/2020 after she had an exercise desaturation study  She did not require supplemental oxygen  She will go home and follow up in the outpatient setting with her primary care physician and Cardiology  She was otherwise discharged home on all of her other pre-admission medications at the preadmission dosages       Please see above list of diagnoses and related plan for additional information  Condition at Discharge: good     Discharge Day Visit / Exam:     Subjective:  Patient seen and examined, no new complaints no distress  Vitals: Blood Pressure: 123/60 (01/04/20 0722)  Pulse: 105 (01/04/20 0722)  Temperature: 97 5 °F (36 4 °C) (01/04/20 0722)  Temp Source: Temporal (01/04/20 0722)  Respirations: 18 (01/04/20 0722)  Height: 5' 2" (157 5 cm) (12/31/19 1317)  Weight - Scale: 121 kg (267 lb 4 9 oz) (01/04/20 0600)  SpO2: 90 % (01/04/20 0722)  Exam:   Physical Exam   Constitutional: She is oriented to person, place, and time  She appears well-developed and well-nourished  HENT:   Head: Normocephalic and atraumatic  Nose: Nose normal    Mouth/Throat: Oropharynx is clear and moist    Eyes: Pupils are equal, round, and reactive to light  Conjunctivae and EOM are normal    Neck: Normal range of motion  Neck supple  No JVD present  No thyromegaly present  Cardiovascular: Normal rate, regular rhythm and intact distal pulses  Exam reveals no gallop and no friction rub  No murmur heard  Pulmonary/Chest: Effort normal and breath sounds normal  No respiratory distress  Abdominal: Soft  Bowel sounds are normal  She exhibits no distension and no mass  There is no tenderness  There is no guarding  Musculoskeletal: Normal range of motion  She exhibits no edema     One to 2+ bilateral lower extremity pitting edema noted, significantly improved since time of arrival   Lymphadenopathy:     She has no cervical adenopathy  Neurological: She is alert and oriented to person, place, and time  No cranial nerve deficit  Skin: Skin is warm  No rash noted  No erythema  Psychiatric: She has a normal mood and affect  Her behavior is normal    Vitals reviewed  Discussion with Family:  No family members were present at the time of my discharge evaluation    Discharge instructions/Information to patient and family:   See after visit summary for information provided to patient and family  Provisions for Follow-Up Care:  See after visit summary for information related to follow-up care and any pertinent home health orders  Disposition:     Home    For Discharges to [facility] SNF:   · Not Applicable to this Patient - Not Applicable to this Patient    Planned Readmission:    None     Discharge Statement:  I spent 40 minutes discharging the patient  This time was spent on the day of discharge  I had direct contact with the patient on the day of discharge  Greater than 50% of the total time was spent examining patient, answering all patient questions, arranging and discussing plan of care with patient as well as directly providing post-discharge instructions  Additional time then spent on discharge activities  Discharge Medications:  See after visit summary for reconciled discharge medications provided to patient and family        ** Please Note: This note has been constructed using a voice recognition system **

## 2020-01-04 NOTE — SOCIAL WORK
I met with the pt and she denies any d/c nneds, plan is for the pt to be d/c home today, pt's family will transport the pt home, pt in agreement with the d/c and d/c plan home, IMM was reviewed with the patient

## 2020-01-04 NOTE — PLAN OF CARE
Problem: Potential for Falls  Goal: Patient will remain free of falls  Description  INTERVENTIONS:  - Assess patient frequently for physical needs  -  Identify cognitive and physical deficits and behaviors that affect risk of falls  -  [place] fall precautions as indicated by assessment   - Educate patient/family on patient safety including physical limitations  - Instruct patient to call for assistance with activity based on assessment  - Modify environment to reduce risk of injury  - Consider OT/PT consult to assist with strengthening/mobility  Outcome: Adequate for Discharge     Problem: Prexisting or High Potential for Compromised Skin Integrity  Goal: Skin integrity is maintained or improved  Description  INTERVENTIONS:  - Identify patients at risk for skin breakdown  - Assess and monitor skin integrity  - Assess and monitor nutrition and hydration status  - Monitor labs   - Assess for incontinence   - Assist with mobility/ambulation  - Relieve pressure over bony prominences  - Avoid friction and shearing  - Provide appropriate hygiene as needed including keeping skin clean and dry  - Evaluate need for skin moisturizer/barrier cream  - Collaborate with interdisciplinary team   - Patient/family teaching     Outcome: Adequate for Discharge     Problem: SAFETY ADULT  Goal: Patient will remain free of falls  Description  INTERVENTIONS:  - Assess patient frequently for physical needs  -  Identify cognitive and physical deficits and behaviors that affect risk of falls    -  [place] fall precautions as indicated by assessment   - Educate patient/family on patient safety including physical limitations  - Instruct patient to call for assistance with activity based on assessment  - Modify environment to reduce risk of injury  - Consider OT/PT consult to assist with strengthening/mobility  Outcome: Adequate for Discharge     Problem: DISCHARGE PLANNING  Goal: Discharge to home or other facility with appropriate resources  Description  INTERVENTIONS:  - Identify barriers to discharge w/patient and caregiver  - Arrange for needed discharge resources and transportation as appropriate  - Identify discharge learning needs (meds, wound care, etc )  - Refer to Case Management Department for coordinating discharge planning if the patient needs post-hospital services based on physician/advanced practitioner order or complex needs related to functional status, cognitive ability, or social support system   Outcome: Adequate for Discharge     Problem: Knowledge Deficit  Goal: Patient/family/caregiver demonstrates understanding of disease process, treatment plan, medications, and discharge instructions  Description  Complete learning assessment and assess knowledge base    Interventions:  - Provide teaching at level of understanding  - Provide teaching via preferred learning methods  Outcome: Adequate for Discharge     Problem: CARDIOVASCULAR - ADULT  Goal: Maintains optimal cardiac output and hemodynamic stability  Description  INTERVENTIONS:  - Monitor I/O, vital signs and rhythm  - Monitor for S/S and trends of decreased cardiac output  - Assess quality of pulses, skin color and temperature  - Assess for signs of decreased coronary artery perfusion  - Instruct patient to report change in severity of symptoms   Outcome: Adequate for Discharge     Problem: RESPIRATORY - ADULT  Goal: Achieves optimal ventilation and oxygenation  Description  INTERVENTIONS:  - Assess for changes in respiratory status  - Assess for changes in mentation and behavior  - Position to facilitate oxygenation and minimize respiratory effort  - Oxygen administered by appropriate delivery if ordered  - Encourage broncho-pulmonary hygiene including cough, deep breathe, Incentive Spirometry  - Assess and instruct to report SOB or any respiratory difficulty  - Respiratory Therapy support as indicated   Outcome: Adequate for Discharge

## 2020-01-04 NOTE — PLAN OF CARE
Problem: Potential for Falls  Goal: Patient will remain free of falls  Description  INTERVENTIONS:  - Assess patient frequently for physical needs  -  Identify cognitive and physical deficits and behaviors that affect risk of falls    -  [place] fall precautions as indicated by assessment   - Educate patient/family on patient safety including physical limitations  - Instruct patient to call for assistance with activity based on assessment  - Modify environment to reduce risk of injury  - Consider OT/PT consult to assist with strengthening/mobility  Outcome: Progressing

## 2020-01-04 NOTE — NURSING NOTE
Pt discharged home  Vss  Pt denies pain, denies shortness of breath  Discharge instructions read and explained to pt,  All questions answered  IV removed without complications and tip intact  All belongings with pt  Pt escorted to front entrance and assisted into car of friend

## 2020-01-15 ENCOUNTER — APPOINTMENT (OUTPATIENT)
Dept: LAB | Facility: HOSPITAL | Age: 70
End: 2020-01-15
Attending: INTERNAL MEDICINE
Payer: COMMERCIAL

## 2020-01-15 ENCOUNTER — TRANSCRIBE ORDERS (OUTPATIENT)
Dept: URGENT CARE | Facility: CLINIC | Age: 70
End: 2020-01-15

## 2020-01-15 DIAGNOSIS — I50.9 CONGESTIVE HEART FAILURE, UNSPECIFIED HF CHRONICITY, UNSPECIFIED HEART FAILURE TYPE (HCC): Primary | ICD-10-CM

## 2020-01-15 DIAGNOSIS — I50.9 CONGESTIVE HEART FAILURE, UNSPECIFIED HF CHRONICITY, UNSPECIFIED HEART FAILURE TYPE (HCC): ICD-10-CM

## 2020-01-15 LAB
ANION GAP SERPL CALCULATED.3IONS-SCNC: 8 MMOL/L (ref 4–13)
BNP SERPL-MCNC: 471 PG/ML (ref 1–100)
BUN SERPL-MCNC: 18 MG/DL (ref 7–25)
CALCIUM SERPL-MCNC: 9.5 MG/DL (ref 8.6–10.5)
CHLORIDE SERPL-SCNC: 101 MMOL/L (ref 98–107)
CO2 SERPL-SCNC: 31 MMOL/L (ref 21–31)
CREAT SERPL-MCNC: 0.96 MG/DL (ref 0.6–1.2)
GFR SERPL CREATININE-BSD FRML MDRD: 60 ML/MIN/1.73SQ M
GLUCOSE SERPL-MCNC: 110 MG/DL (ref 65–99)
POTASSIUM SERPL-SCNC: 4.2 MMOL/L (ref 3.5–5.5)
SODIUM SERPL-SCNC: 140 MMOL/L (ref 134–143)

## 2020-01-15 PROCEDURE — 36415 COLL VENOUS BLD VENIPUNCTURE: CPT

## 2020-01-15 PROCEDURE — 80048 BASIC METABOLIC PNL TOTAL CA: CPT

## 2020-01-15 PROCEDURE — 83880 ASSAY OF NATRIURETIC PEPTIDE: CPT

## 2020-02-03 ENCOUNTER — OFFICE VISIT (OUTPATIENT)
Dept: CARDIOLOGY CLINIC | Facility: CLINIC | Age: 70
End: 2020-02-03
Payer: COMMERCIAL

## 2020-02-03 VITALS
WEIGHT: 240 LBS | HEIGHT: 62 IN | SYSTOLIC BLOOD PRESSURE: 138 MMHG | BODY MASS INDEX: 44.16 KG/M2 | DIASTOLIC BLOOD PRESSURE: 84 MMHG | HEART RATE: 86 BPM

## 2020-02-03 DIAGNOSIS — E78.5 HYPERLIPIDEMIA: ICD-10-CM

## 2020-02-03 DIAGNOSIS — E66.01 MORBID OBESITY (HCC): ICD-10-CM

## 2020-02-03 DIAGNOSIS — I50.32 CHRONIC DIASTOLIC CONGESTIVE HEART FAILURE (HCC): ICD-10-CM

## 2020-02-03 DIAGNOSIS — I48.19 PERSISTENT ATRIAL FIBRILLATION (HCC): Primary | ICD-10-CM

## 2020-02-03 PROCEDURE — 99214 OFFICE O/P EST MOD 30 MIN: CPT | Performed by: INTERNAL MEDICINE

## 2020-02-03 NOTE — PROGRESS NOTES
[facility] [address] [facility], [address]    [place], [address]   [phone]                                              Cardiology Office Follow up  Saravanan Singh, 78 y o  female  YOB: 1950  MRN: [id number] Encounter: [id number]      PCP - Ar Mehta MD    Assessment and Plan  1  Paroxysmal Atrial Fibrillation   2  Diastolic heart failure  3  Hypertension  4  Hyperlipidemia  5  Morbid obesity, There is no height or weight on file to calculate BMI  Plan  Persistent Atrial Fibrillation  · Had initial episode in 8/2019, with recurrence in 12/2019  Has remained in afib since 12/2019  · Risk factors evaluation  · Thyroid disease? No  Last TSH 1 17  · Excess Alcohol? Rarely drinks alcohol  · Untreated YUDI? Has multiple risk factors including obesity, neck circumference, snorting, daytime sleepiness  She is currently awaiting a sleep study insurance approval  · Smoking? No  · Obesity? There is no height or weight on file to calculate BMI  Extensively counseled regarding weight loss and need to exercise  · Heart failure/CAD? Has had multiple hospitalizations for heart failure, related to afib now  Nuclear stress test is pending  Reinforced the need to complete it soon  She will get it scheduled  · Rhythm control v/s rate control  · Previous rhythm control attempts?   SUSIE-cardioversion - 8/30/19 - successful  · Remains in normal sinus rhythm today  · Previously had bradycardia after conversion to NSR, while on cardizem + metoprolol + digoxin  · Currently on metoprolol 50 tid + cardizem   · Considering her recurrent heart failure presentations , she will benefit from rhythm control options  · She was referred to see EP after recent hospitalization, but feels that she is feeling wonderful after all the recent weight loss, and as a result wants to hold off  · I offered her the option of rhythm control with sotalol instead, but she wants to hold off for now  · Stroke prevention  · Continue Eliquis 5mg b i d  Chronic diastolic heart failure  · Appears euvolemic on exam today  · Weight is currently down to 238-240 lb, which is the lowest weight she has been in a long time  · Continue oral lasix 40 daily  · Low salt diet    Morbid obesity, Body mass index is 43 9 kg/m²  · She has completely changed down her diet, and has been avoiding salt as much as possible, and has also cut down on carbs and fat  · Weight down to 240 lbs    Hyperlipidemia  · Lipid panel 05/07/2019 showed total cholesterol 180, triglycerides 153, HDL 62, LDL direct 87  · Continue atorvastatin 40 mg    History of Present Illness   71-year-old female comes in as a new patient, for hospital follow-up, after recent diagnosis of atrial fibrillation  I saw her in the hospital when she had presented with rapid atrial fibrillation and heart failure  She had needed multiple medications including max doses of Cardizem, metoprolol and digoxin for rate control, and was very volume overloaded and short of breath needing BiPAP initially  She was diuresed with IV Lasix, and subsequently transferred to [facility] for a SUSIE cardioversion  She was successfully cardioverted, and discharged home on oral Lasix and reduced doses of rate control medications  Since discharge, she has been doing well, and does not have any shortness of breath  Her weight has continued to go down, and she is down from a hospital rate of more than 290 lb to 253 lb currently  She notes that she has not been this weight for almost 2 decades, and feels great  She has been trying to make some lifestyle changes and dietary modifications  Interval history - 2/3/2020  She comes back for follow-up after 4 months  In the interim, she has had couple of hospitalizations for shortness of breath and heart failure  She went back into atrial fibrillation in mid December, and appears to have remained in it    Her most recent hospitalization, she was placed on oral Lasix 40 mg daily, and with this she has gradually continued to lose weight since discharge  His weight has gone down to 237-240 lb over the last few days, and has plateaued at that  She feels wonderful, and says that her legs have never been this thin recently  Not to complains of chest pain, shortness of breath, palpitations, dizziness or lightheadedness  Historical Information   Past Medical History:   Diagnosis Date    Anxiety     Atrial fibrillation     Diastolic CHF     Hyperlipidemia     Hypertension     Obesity      Past Surgical History:   Procedure Laterality Date    CARDIOVERSION      TONSILLECTOMY       Family History   Problem Relation Age of Onset    Heart attack Mother     Hypertension Mother     Cancer Father      Current Outpatient Medications on File Prior to Visit   Medication Sig Dispense Refill    apixaban (ELIQUIS) 5 mg Take 1 tablet (5 mg total) by mouth 2 (two) times a day 60 tablet 1    atorvastatin (LIPITOR) 40 mg tablet atorvastatin 40 mg tablet   take one tab by mouth once daily      diltiazem (CARDIZEM CD) 360 MG 24 hr capsule Take 1 capsule (360 mg total) by mouth daily 30 capsule 0    escitalopram (LEXAPRO) 20 mg tablet Take by mouth Daily       furosemide (LASIX) 40 mg tablet Take 1 tablet (40 mg total) by mouth daily in the early morning 30 tablet 0    lisinopril (ZESTRIL) 5 mg tablet Take 1 tablet (5 mg total) by mouth daily 30 tablet 0    metoprolol tartrate (LOPRESSOR) 50 mg tablet Take 1 tablet (50 mg total) by mouth 3 (three) times a day 90 tablet 0     No current facility-administered medications on file prior to visit  Allergies   Allergen Reactions    Other Itching     lobster    Penicillins Rash     Social History     Socioeconomic History    Marital status:       Spouse name: None    Number of children: None    Years of education: None    Highest education level: None   Occupational History    None   Social Needs    Financial resource strain: None    Food insecurity:     Worry: None     Inability: None    Transportation needs:     Medical: None     Non-medical: None   Tobacco Use    Smoking status: Never Smoker    Smokeless tobacco: Never Used   Substance and Sexual Activity    Alcohol use: Never     Frequency: Never     Comment: social    Drug use: Never    Sexual activity: None   Lifestyle    Physical activity:     Days per week: None     Minutes per session: None    Stress: None   Relationships    Social connections:     Talks on phone: None     Gets together: None     Attends Sikhism service: None     Active member of club or organization: None     Attends meetings of clubs or organizations: None     Relationship status: None    Intimate partner violence:     Fear of current or ex partner: None     Emotionally abused: None     Physically abused: None     Forced sexual activity: None   Other Topics Concern    None   Social History Narrative    None        Review of Systems   All other systems reviewed and are negative  Vitals: There were no vitals filed for this visit  BMI - There is no height or weight on file to calculate BMI  Wt Readings from Last 7 Encounters:   01/04/20 121 kg (267 lb 4 9 oz)   12/20/19 130 kg (285 lb 11 5 oz)   09/12/19 115 kg (254 lb)   09/02/19 122 kg (270 lb)   08/31/19 126 kg (277 lb 14 4 oz)   08/29/19 129 kg (285 lb)       Physical Exam   Constitutional: She is oriented to person, place, and time  She appears well-developed and well-nourished  No distress  HENT:   Head: Normocephalic and atraumatic  Eyes: No scleral icterus  Neck: No JVD present  Cardiovascular: Normal rate and normal heart sounds  Exam reveals no gallop and no friction rub  No murmur heard  Pulmonary/Chest: Effort normal and breath sounds normal  No respiratory distress  She has no rales  Abdominal: Soft  She exhibits no distension  There is no tenderness  Musculoskeletal: She exhibits no edema  Neurological: She is alert and oriented to person, place, and time  Skin: Skin is warm  Psychiatric: She has a normal mood and affect  Her behavior is normal    Nursing note and vitals reviewed  Labs:  CBC:   Lab Results   Component Value Date    WBC 10 70 01/02/2020    RBC 4 18 01/02/2020    HGB 11 8 (L) 01/02/2020    HCT 37 0 (L) 01/02/2020    MCV 88 01/02/2020     01/02/2020    RDW 14 7 (H) 01/02/2020       CMP:   Lab Results   Component Value Date    K 4 2 01/15/2020     01/15/2020    CO2 31 01/15/2020    BUN 18 01/15/2020    CREATININE 0 96 01/15/2020    EGFR 60 01/15/2020    CALCIUM 9 5 01/15/2020    AST 17 12/18/2019    ALT 17 12/18/2019    ALKPHOS 81 12/18/2019       Magnesium:  Lab Results   Component Value Date    MG 2 2 01/01/2020       Lipid Profile:   Lab Results   Component Value Date    HDL 62 (H) 05/07/2019    TRIG 153 (H) 05/07/2019    LDLCALC 87 05/07/2019       Thyroid Studies:   Lab Results   Component Value Date    BCP9TBFHPVHO 2 310 12/17/2019    FREET4 1 22 05/07/2019       No components found for: [employer] [place]    Lab Results   Component Value Date    INR 1 52 (H) 12/17/2019    INR 1 26 08/26/2019   5    Imaging: Xr Chest Portable    Result Date: 8/29/2019  Narrative: CHEST INDICATION:   hypoxia  COMPARISON:  Chest radiographs August 26, 2019 and CT pulmonary angiogram August 26, 2019  EXAM PERFORMED/VIEWS:  XR CHEST PORTABLE  AP semierect Images: 2 FINDINGS: The heart is enlarged  Atherosclerotic changes in the aorta  Lung volumes diminished  Bilateral pleural effusions,, slightly increased  Janie and pulmonary vessels are prominent  Bilateral perihilar and lower lobe infiltrates improved  Osseous structures appear within normal limits for patient age  Impression: Mild improvement in the overall appearance of the chest, suggesting resolving vascular congestion and improving multifocal pneumonia   Workstation performed: [id number]     Xr Chest 1 View Portable    Result Date: 8/26/2019  Narrative: CHEST INDICATION:   shortness of breath, afib rvr  COMPARISON:  None EXAM PERFORMED/VIEWS:  XR CHEST PORTABLE FINDINGS: There is cardiomegaly  There seem to be right greater than left bibasilar opacities which could be bilateral pneumonia or bibasilar edema  Correlate for any fever  There might be trace amounts of pleural fluid  No pneumothorax or mediastinal shift  Osseous structures appear within normal limits for patient age  Impression: Bibasilar opacities could be pneumonia or bibasilar edema  Suggestion of trace amount of pleural fluid as well  Cardiomegaly noted  Workstation performed: [id number]     Cta Ed Chest Pe Study    Result Date: 8/26/2019  Narrative: CTA - CHEST WITH IV CONTRAST - PULMONARY ANGIOGRAM INDICATION:   Shortness of breath elevated ddimer, sob, hypoxia  COMPARISON: None  TECHNIQUE: CTA examination of the chest was performed using angiographic technique according to a protocol specifically tailored to evaluate for pulmonary embolism  Axial, sagittal, and coronal 2D reformatted images were created from the source data and  submitted for interpretation  In addition, coronal 3D MIP postprocessing was performed on the acquisition scanner  Radiation dose length product (DLP) for this visit:  668 mGy-cm   This examination, like all CT scans performed in the [facility], was performed utilizing techniques to minimize radiation dose exposure, including the use of iterative reconstruction and automated exposure control  IV Contrast:  85 mL of iohexol (OMNIPAQUE)  FINDINGS: PULMONARY ARTERIAL TREE: Evaluation is somewhat limited due to respiratory motion artifact, which degrades images  No definite pulmonary embolus is seen  LUNGS:  Slightly limited study due to respiratory motion artifact  There is bilateral multi lobar consolidation, most compatible with pneumonia    There is no tracheal or endobronchial lesion  PLEURA:  Small bilateral pleural effusions, right greater than left  HEART/GREAT VESSELS:  Heart is mildly enlarged  No pericardial effusion  Coronary artery calcifications noted  Normal caliber thoracic aorta with mild atherosclerotic calcifications  MEDIASTINUM AND MIRA:  Unremarkable  CHEST WALL AND LOWER NECK:   Unremarkable  VISUALIZED STRUCTURES IN THE UPPER ABDOMEN:  There are gallstones, with no definite pericholecystic inflammatory changes appreciated  OSSEOUS STRUCTURES:  No acute fracture or destructive osseous lesion  Impression: Slightly limited study due to respiratory motion artifact  No definite evidence of pulmonary embolism  Bilateral multifocal consolidation compatible with multilobar pneumonia  Small bilateral pleural effusions, presumably parapneumonic in nature  Mild cardiomegaly  Cholelithiasis  Workstation performed: [id number]       Cardiac testing:   Results for orders placed during the hospital encounter of 19   Echo complete with contrast if indicated    Narrative Formerly named [facility] [address]    Transthoracic Echocardiogram  2D, M-mode, Doppler, and Color Doppler    Study date:  26-Aug-2019    Patient: Vikci Hernandez  MR number: [id number]  Account number: [de-identified]  : 1950  Age: 78 years  Gender: Female  Status: Inpatient  Location: [facility]   Height: 61 in  Weight: 290 4 lb  BP: 122/ 67 mmHg    Indications: Atrial fibrillation  Diagnoses: I48 0 - Atrial fibrillation    Sonographer:  Jason Varela RDCS  Referring Physician:  Timur Wallis MD  Group:  [facility]  Interpreting Physician:  Archie Fair MD    SUMMARY    LEFT VENTRICLE:  Systolic function was normal  Ejection fraction was estimated to be 60 %  There were no regional wall motion abnormalities  RIGHT VENTRICLE:  Wall thickness was mildly increased      MITRAL VALVE:  There was mild regurgitation  HISTORY: Dyspnea  PROCEDURE: The study was performed in the [facility]  This was a stat study  The transthoracic approach was used  The study included complete 2D imaging, M-mode, complete spectral Doppler, and color Doppler  The heart  rate was 142 bpm, at the start of the study  Images were obtained from the parasternal, apical, subcostal, and suprasternal notch acoustic windows  Intravenous contrast ( 0 8 ml of Definity in NSS) was administered to opacify the left  ventricle  Echocardiographic views were limited due to restricted patient mobility, poor acoustic window availability, decreased penetration, and lung interference  This was a technically difficult study  LEFT VENTRICLE: Size was normal  Systolic function was normal  Ejection fraction was estimated to be 60 %  There were no regional wall motion abnormalities  Wall thickness was normal  No evidence of apical thrombus  DOPPLER: The study was  not technically sufficient to allow evaluation of LV diastolic function  RIGHT VENTRICLE: The size was normal  Systolic function was normal  Wall thickness was mildly increased  LEFT ATRIUM: Size was normal     RIGHT ATRIUM: Size was normal     MITRAL VALVE: Valve structure was normal  There was normal leaflet separation  DOPPLER: The transmitral velocity was within the normal range  There was no evidence for stenosis  There was mild regurgitation  AORTIC VALVE: The valve was trileaflet  Leaflets exhibited normal thickness and normal cuspal separation  DOPPLER: Transaortic velocity was within the normal range  There was no evidence for stenosis  There was no significant  regurgitation  TRICUSPID VALVE: The valve structure was normal  There was normal leaflet separation  DOPPLER: The transtricuspid velocity was within the normal range  There was no evidence for stenosis  There was no significant regurgitation  PULMONIC VALVE: Not well visualized  DOPPLER: The transpulmonic velocity was within the normal range  There was no significant regurgitation  PERICARDIUM: There was no pericardial effusion  The pericardium was normal in appearance  AORTA: The root exhibited normal size  SYSTEMIC VEINS: IVC: The inferior vena cava was normal in size  SYSTEM MEASUREMENT TABLES    2D  %FS: 30 72 %  AV Diam: 2 98 cm  Ao asc: 3 04 cm  EDV(Teich): 106 66 ml  EF(Teich): 58 19 %  ESV(Teich): 44 6 ml  IVSd: 1 29 cm  LA Area: 22 52 cm2  LA Diam: 4 44 cm  LVIDd: 4 78 cm  LVIDs: 3 31 cm  LVPWd: 1 3 cm  RA Area: 24 74 cm2  SI(Teich): 27 96 ml/m2  SV(Cube): 73 06 ml  SV(Teich): 62 07 ml    CW  TR Vmax: 3 12 m/s  TR maxP 88 mmHg    MM  TAPSE: 1 21 cm    [facility]    Prepared and electronically signed by    Hosea Davenport MD  Signed 26-Aug-2019 15:58:59       Results for orders placed during the hospital encounter of 19   SUSIE    Kassy Flor DO     2019 12:52 PM  Procedure note:  SUSIE Preliminary Report    Impression:     LV:  Normal size and systolic function  Left ventricular   ejection fraction ~ 60%  LA:  Mildly dilated  GRAY:  Normal size and function  No spontaneous echocontrast   ("smoke"), or thrombus  RA:  Normal size  Interatrial septum:  Probable very small tunneled PFO with right   to left shunting noted by color flow doppler study  RV:  Normal size and systolic function  MV:  Normal structure  No mitral stenosis  Trace regurgitation  AV:  Normal structure  No aortic stenosis  Trace regurgitation  TV:  Normal structure  No tricuspid stenosis  Mild   regurgitation  PV:  Grossly normal but poorly visualized  No gross pulmonic   stenosis or significant regurgitation    AO:  Normal appearing root, descending thoracic aorta, and aortic   arch with minimal atherocalcific change in the upper descending   thoracic aorta    Informed consent obtained from patient prior to procedure after   all indications, risks, and benefits of SUSIE thoroughly discussed  Propofol was utilized for sedation and administered   intravenously by Anesthesia  Blood pressure, EKG, pulse   oximetry, respirations, and level of consciousness were monitored   throughout the procedure  Pt tolerated procedure well with nurse   anesthetist performing sedation and monitoring  SUSIE probe passed   without difficulty with no blood seen on probe upon extubation  No results found for this or any previous visit  No results found for this or any previous visit

## 2020-02-03 NOTE — LETTER
February 3, 2020     Patient: Mary Stroud   YOB: 1950   Date of Visit: 2/3/2020       To Whom it May Concern:    Mary Stroud is under my professional care  She was seen in my office on 2/3/2020  She is able to start exercising in the gym, with close monitoring of her heart rate  If you have any questions or concerns, please don't hesitate to call           Sincerely,          Titus Vazquez MD        CC: No Recipients

## 2020-02-03 NOTE — PATIENT INSTRUCTIONS
DASH Eating Plan   AMBULATORY CARE:   The DASH (Dietary Approaches to Stop Hypertension) Eating Plan  is designed to help prevent or lower high blood pressure  It can also help to lower LDL (bad) cholesterol and decrease your risk for heart disease  The plan is low in sodium, sugar, unhealthy fats, and total fat  It is high in potassium, calcium, magnesium, and fiber  These nutrients are added when you eat more fruits, vegetables, and whole grains  Your sodium limit each day: Your dietitian will tell you how much sodium is safe for you to have each day  People with high blood pressure should have no more than 1,500 to 2,300 mg of sodium in a day  A teaspoon (tsp) of salt has 2,300 mg of sodium  This may seem like a difficult goal, but small changes to the foods you eat can make a big difference  Your healthcare provider or dietitian can help you create a meal plan that follows your sodium limit  How to limit sodium:   · Read food labels  Food labels can help you choose foods that are low in sodium  The amount of sodium is listed in milligrams (mg)  The % Daily Value (DV) column tells you how much of your daily needs are met by 1 serving of the food for each nutrient listed  Choose foods that have less than 5% of the DV of sodium  These foods are considered low in sodium  Foods that have 20% or more of the DV of sodium are considered high in sodium  Avoid foods that have more than 300 mg of sodium in each serving  Choose foods that say low-sodium, reduced-sodium, or no salt added on the food label  · Avoid salt  Do not salt food at the table, and add very little salt to foods during cooking  Use herbs and spices, such as onions, garlic, and salt-free seasonings to add flavor to foods  Try lemon or lime juice or vinegar to give foods a tart flavor  Use hot peppers or a small amount of hot pepper sauce to add a spicy flavor to foods  · Ask about salt substitutes    Ask your healthcare provider if you may use salt substitutes  Some salt substitutes have ingredients that can be harmful if you have certain health conditions  · Choose foods carefully at restaurants  Meals from restaurants, especially fast food restaurants, are often high in sodium  Some restaurants have nutrition information that tells you the amount of sodium in their foods  Ask to have your food prepared with less, or no salt  What you need to know about fats:   · Include healthy fats  Examples are unsaturated fats and omega-3 fatty acids  Unsaturated fats are found in soybean, canola, olive, or sunflower oil, and liquid and soft tub margarines  Omega-3 fatty acids are found in fatty fish, such as salmon, tuna, mackerel, and sardines  It is also found in flaxseed oil and ground flaxseed  · Avoid unhealthy fats  Do not eat unhealthy fats, such as saturated fats and trans fats  Saturated fats are found in foods that contain fat from animals  Examples are fatty meats, whole milk, butter, cream, and other dairy foods  It is also found in shortening, stick margarine, palm oil, and coconut oil  Trans fats are found in fried foods, crackers, chips, and baked goods made with margarine or shortening  Foods to include: With the DASH eating plan, you need to eat a certain number of servings from each food group  This will help you get enough of certain nutrients and limit others  The amount of servings you should eat depends on how many calories you need  Your dietitian can tell you how many calories you need  The number of servings listed next to the food groups below are for people who need about 2,000 calories each day    · Grains:  6 to 8 servings (3 of these servings should be whole-grain foods)    ¨ 1 slice of whole-grain bread     ¨ 1 ounce of dry cereal    ¨ ½ cup of cooked cereal, pasta, or brown rice    · Vegetables and fruits:  4 to 5 servings of fruits and 4 to 5 servings of vegetables    ¨ 1 medium fruit    ¨ ½ cup of frozen, canned (no added salt), or chopped fresh vegetables     ¨ ½ cup of fresh, frozen, dried, or canned fruit (canned in light syrup or fruit juice)    ¨ ½ cup of vegetable or fruit juice    · Dairy:  2 to 3 servings    ¨ 1 cup of nonfat (skim) or 1% milk    ¨ 1½ ounces of fat-free or low-fat cheese    ¨ 6 ounces of nonfat or low-fat yogurt    · Lean meat, poultry, and fish:  6 ounces or less    [employer] (chicken, turkey) with no skin    ¨ Fish (especially fatty fish, such as salmon, fresh tuna, or mackerel)    ¨ Lean beef and pork (loin, round, extra lean hamburger)    ¨ Egg whites and egg substitutes    · Nuts, seeds, and legumes:  4 to 5 servings each week    ¨ ½ cup of cooked beans and peas    ¨ 1½ ounces of unsalted nuts    ¨ 2 tablespoons of peanut butter or seeds    · Sweets and added sugars:  5 or less each week    ¨ 1 tablespoon of sugar, jelly, or jam    ¨ ½ cup of sorbet or gelatin    ¨ 1 cup of lemonade    · Fats:  2 to 3 servings each week    ¨ 1 teaspoon of soft margarine or vegetable oil    ¨ 1 tablespoon of mayonnaise    ¨ 2 tablespoons of salad dressing  Foods to avoid:   · Grains:      [employer], such as doughnuts, pastries, cookies, and biscuits (high in fat and sugar)    ¨ Mixes for cornbread and biscuits, packaged foods, such as bread stuffing, rice and pasta mixes, macaroni and cheese, and instant cereals (high in sodium)    · Fruits and vegetables:      ¨ Regular, canned vegetables (high in sodium)    ¨ Sauerkraut, pickled vegetables, and other foods prepared in brine (high in sodium)    ¨ Fried vegetables or vegetables in butter or high-fat sauces    ¨ Fruit in cream or butter sauce (high in fat)    · Dairy:      ¨ Whole milk, 2% milk, and cream (high in fat)    ¨ Regular cheese and processed cheese (high in fat and sodium)    · Meats and protein foods:      ¨ Smoked or cured meat, such as corned beef, jamison, ham, hot dogs, and sausage (high in fat and sodium)    ¨ Canned beans and canned meats or spreads, such as potted meats, sardines, anchovies, and imitation seafood (high in sodium)    ¨ Deli or lunch meats, such as bologna, ham, turkey, and roast beef (high in sodium)    ¨ High-fat meat (T-bone steak, regular hamburger, and ribs)    ¨ Whole eggs and egg yolks (high in fat)    · Other:      ¨ Seasonings made with salt, such as garlic salt, celery salt, onion salt, seasoned salt, meat tenderizers, and monosodium glutamate (MSG)    ¨ Miso soup and canned or dried soup mixes (high in sodium)    ¨ Regular soy sauce, barbecue sauce, teriyaki sauce, steak sauce, Worcestershire sauce, and most flavored vinegars (high in sodium)    ¨ Regular condiments, such as mustard, ketchup, and salad dressings (high in sodium)    ¨ Gravy and sauces, such as Julito or cheese sauces (high in sodium and fat)    ¨ Drinks high in sugar, such as soda or fruit drinks    [employer] foods, such as salted chips, popcorn, pretzels, pork rinds, salted crackers, and salted nuts    ¨ Frozen foods, such as dinners, entrees, vegetables with sauces, and breaded meats (high in sodium)  Other guidelines to follow:   · Maintain a healthy weight  Your risk for heart disease is higher if you are overweight  Your healthcare provider may suggest that you lose weight if you are overweight  You can lose weight by eating fewer calories and foods that have added sugars and fat  The DASH meal plan can help you do this  Decrease calories by eating smaller portions at each meal and fewer snacks  Ask your healthcare provider for more information about how to lose weight  · Exercise regularly  Regular exercise can help you reach or maintain a healthy weight  Regular exercise can also help decrease your blood pressure and improve your cholesterol levels  Get 30 minutes or more of moderate exercise each day of the week  To lose weight, get at least 60 minutes of exercise  Talk to your healthcare provider about the best exercise program for you      · Limit alcohol  Women should limit alcohol to 1 drink a day  Men should limit alcohol to 2 drinks a day  A drink of alcohol is 12 ounces of beer, 5 ounces of wine, or 1½ ounces of liquor  © 2017 [address] Information is for End User's use only and may not be sold, redistributed or otherwise used for commercial purposes  All illustrations and images included in CareNotes® are the copyrighted property of [employer] , [employer]  or [employer]  The above information is an  only  It is not intended as medical advice for individual conditions or treatments  Talk to your doctor, nurse or pharmacist before following any medical regimen to see if it is safe and effective for you

## 2020-02-13 ENCOUNTER — HOSPITAL ENCOUNTER (OUTPATIENT)
Dept: NON INVASIVE DIAGNOSTICS | Facility: HOSPITAL | Age: 70
Discharge: HOME | End: 2020-02-13
Payer: COMMERCIAL

## 2020-02-13 ENCOUNTER — HOSPITAL ENCOUNTER (OUTPATIENT)
Dept: NUCLEAR MEDICINE | Facility: HOSPITAL | Age: 70
Discharge: HOME | End: 2020-02-13
Payer: COMMERCIAL

## 2020-02-13 ENCOUNTER — HOSPITAL ENCOUNTER (OUTPATIENT)
Dept: NUCLEAR MEDICINE | Facility: HOSPITAL | Age: 70
Discharge: HOME | End: 2020-02-13

## 2020-02-13 DIAGNOSIS — I48.19 PERSISTENT ATRIAL FIBRILLATION (HCC): Chronic | ICD-10-CM

## 2020-02-13 PROCEDURE — 93017 CV STRESS TEST TRACING ONLY: CPT

## 2020-02-13 PROCEDURE — A9502 TC99M TETROFOSMIN: HCPCS

## 2020-02-13 PROCEDURE — 93016 CV STRESS TEST SUPVJ ONLY: CPT | Performed by: INTERNAL MEDICINE

## 2020-02-13 PROCEDURE — 78452 HT MUSCLE IMAGE SPECT MULT: CPT | Performed by: INTERNAL MEDICINE

## 2020-02-13 PROCEDURE — 78452 HT MUSCLE IMAGE SPECT MULT: CPT

## 2020-02-13 PROCEDURE — 93018 CV STRESS TEST I&R ONLY: CPT | Performed by: INTERNAL MEDICINE

## 2020-02-13 RX ADMIN — REGADENOSON 0.4 MG: 0.08 INJECTION, SOLUTION INTRAVENOUS at 09:15

## 2020-02-18 LAB
ARRHY DURING EX: NORMAL
CHEST PAIN STATEMENT: NORMAL
ECG INTERP BEFORE EX: NORMAL
MAX DIASTOLIC BP: 80 MMHG
MAX HEART RATE: 127 BPM
MAX PREDICTED HEART RATE: 150 BPM
MAX. SYSTOLIC BP: 134 MMHG
PROTOCOL NAME: NORMAL
REASON FOR TERMINATION: NORMAL
TARGET HR FORMULA: NORMAL
TEST INDICATION: NORMAL
TIME IN EXERCISE PHASE: NORMAL

## 2020-06-10 ENCOUNTER — TELEPHONE (OUTPATIENT)
Dept: SLEEP CENTER | Facility: CLINIC | Age: 70
End: 2020-06-10

## 2020-06-23 ENCOUNTER — TRANSCRIBE ORDERS (OUTPATIENT)
Dept: URGENT CARE | Facility: CLINIC | Age: 70
End: 2020-06-23

## 2020-06-23 ENCOUNTER — APPOINTMENT (OUTPATIENT)
Dept: LAB | Facility: CLINIC | Age: 70
End: 2020-06-23
Payer: MEDICARE

## 2020-06-23 DIAGNOSIS — R53.83 FATIGUE, UNSPECIFIED TYPE: ICD-10-CM

## 2020-06-23 DIAGNOSIS — E55.9 VITAMIN D DEFICIENCY: ICD-10-CM

## 2020-06-23 DIAGNOSIS — E78.5 HYPERLIPIDEMIA, UNSPECIFIED HYPERLIPIDEMIA TYPE: ICD-10-CM

## 2020-06-23 DIAGNOSIS — I50.9 CONGESTIVE HEART FAILURE, UNSPECIFIED HF CHRONICITY, UNSPECIFIED HEART FAILURE TYPE (HCC): Primary | ICD-10-CM

## 2020-06-23 LAB
25(OH)D3 SERPL-MCNC: 15 NG/ML (ref 30–100)
ALBUMIN SERPL BCP-MCNC: 3.6 G/DL (ref 3.5–5)
ALP SERPL-CCNC: 132 U/L (ref 46–116)
ALT SERPL W P-5'-P-CCNC: 56 U/L (ref 12–78)
ANION GAP SERPL CALCULATED.3IONS-SCNC: 8 MMOL/L (ref 4–13)
AST SERPL W P-5'-P-CCNC: 37 U/L (ref 5–45)
BASOPHILS # BLD AUTO: 0.08 THOUSANDS/ΜL (ref 0–0.1)
BASOPHILS NFR BLD AUTO: 1 % (ref 0–1)
BILIRUB SERPL-MCNC: 0.49 MG/DL (ref 0.2–1)
BUN SERPL-MCNC: 23 MG/DL (ref 5–25)
CALCIUM SERPL-MCNC: 9.5 MG/DL (ref 8.3–10.1)
CHLORIDE SERPL-SCNC: 107 MMOL/L (ref 100–108)
CHOLEST SERPL-MCNC: 145 MG/DL (ref 50–200)
CO2 SERPL-SCNC: 25 MMOL/L (ref 21–32)
CREAT SERPL-MCNC: 1.02 MG/DL (ref 0.6–1.3)
EOSINOPHIL # BLD AUTO: 0.16 THOUSAND/ΜL (ref 0–0.61)
EOSINOPHIL NFR BLD AUTO: 2 % (ref 0–6)
ERYTHROCYTE [DISTWIDTH] IN BLOOD BY AUTOMATED COUNT: 12.1 % (ref 11.6–15.1)
GFR SERPL CREATININE-BSD FRML MDRD: 55 ML/MIN/1.73SQ M
GLUCOSE P FAST SERPL-MCNC: 101 MG/DL (ref 65–99)
HCT VFR BLD AUTO: 43.5 % (ref 34.8–46.1)
HDLC SERPL-MCNC: 46 MG/DL
HGB BLD-MCNC: 14.3 G/DL (ref 11.5–15.4)
IMM GRANULOCYTES # BLD AUTO: 0.02 THOUSAND/UL (ref 0–0.2)
IMM GRANULOCYTES NFR BLD AUTO: 0 % (ref 0–2)
LDLC SERPL CALC-MCNC: 75 MG/DL (ref 0–100)
LYMPHOCYTES # BLD AUTO: 1.42 THOUSANDS/ΜL (ref 0.6–4.47)
LYMPHOCYTES NFR BLD AUTO: 21 % (ref 14–44)
MCH RBC QN AUTO: 29.7 PG (ref 26.8–34.3)
MCHC RBC AUTO-ENTMCNC: 32.9 G/DL (ref 31.4–37.4)
MCV RBC AUTO: 90 FL (ref 82–98)
MONOCYTES # BLD AUTO: 0.64 THOUSAND/ΜL (ref 0.17–1.22)
MONOCYTES NFR BLD AUTO: 9 % (ref 4–12)
NEUTROPHILS # BLD AUTO: 4.62 THOUSANDS/ΜL (ref 1.85–7.62)
NEUTS SEG NFR BLD AUTO: 67 % (ref 43–75)
NONHDLC SERPL-MCNC: 99 MG/DL
NRBC BLD AUTO-RTO: 0 /100 WBCS
PLATELET # BLD AUTO: 250 THOUSANDS/UL (ref 149–390)
PMV BLD AUTO: 10.5 FL (ref 8.9–12.7)
POTASSIUM SERPL-SCNC: 3.7 MMOL/L (ref 3.5–5.3)
PROT SERPL-MCNC: 7.2 G/DL (ref 6.4–8.2)
RBC # BLD AUTO: 4.81 MILLION/UL (ref 3.81–5.12)
SODIUM SERPL-SCNC: 140 MMOL/L (ref 136–145)
TRIGL SERPL-MCNC: 119 MG/DL
WBC # BLD AUTO: 6.94 THOUSAND/UL (ref 4.31–10.16)

## 2020-06-23 PROCEDURE — 85025 COMPLETE CBC W/AUTO DIFF WBC: CPT

## 2020-06-23 PROCEDURE — 82306 VITAMIN D 25 HYDROXY: CPT

## 2020-06-23 PROCEDURE — 36415 COLL VENOUS BLD VENIPUNCTURE: CPT

## 2020-06-23 PROCEDURE — 80053 COMPREHEN METABOLIC PANEL: CPT

## 2020-06-23 PROCEDURE — 80061 LIPID PANEL: CPT

## 2020-11-13 ENCOUNTER — TRANSCRIBE ORDERS (OUTPATIENT)
Dept: LAB | Facility: CLINIC | Age: 70
End: 2020-11-13

## 2020-11-13 ENCOUNTER — LAB (OUTPATIENT)
Dept: LAB | Facility: CLINIC | Age: 70
End: 2020-11-13
Payer: MEDICARE

## 2020-11-13 DIAGNOSIS — E55.9 VITAMIN D DEFICIENCY: ICD-10-CM

## 2020-11-13 DIAGNOSIS — I10 HYPERTENSION, UNSPECIFIED TYPE: Primary | ICD-10-CM

## 2020-11-13 LAB
25(OH)D3 SERPL-MCNC: 28 NG/ML (ref 30–100)
ANION GAP SERPL CALCULATED.3IONS-SCNC: 5 MMOL/L (ref 4–13)
BUN SERPL-MCNC: 12 MG/DL (ref 5–25)
CALCIUM SERPL-MCNC: 9.6 MG/DL (ref 8.3–10.1)
CHLORIDE SERPL-SCNC: 104 MMOL/L (ref 100–108)
CO2 SERPL-SCNC: 30 MMOL/L (ref 21–32)
CREAT SERPL-MCNC: 1.08 MG/DL (ref 0.6–1.3)
GFR SERPL CREATININE-BSD FRML MDRD: 52 ML/MIN/1.73SQ M
GLUCOSE SERPL-MCNC: 111 MG/DL (ref 65–140)
POTASSIUM SERPL-SCNC: 3.6 MMOL/L (ref 3.5–5.3)
SODIUM SERPL-SCNC: 139 MMOL/L (ref 136–145)

## 2020-11-13 PROCEDURE — 36415 COLL VENOUS BLD VENIPUNCTURE: CPT

## 2020-11-13 PROCEDURE — 80048 BASIC METABOLIC PNL TOTAL CA: CPT

## 2020-11-13 PROCEDURE — 82306 VITAMIN D 25 HYDROXY: CPT

## 2021-03-10 DIAGNOSIS — Z23 ENCOUNTER FOR IMMUNIZATION: ICD-10-CM

## 2021-06-29 ENCOUNTER — APPOINTMENT (OUTPATIENT)
Dept: LAB | Facility: CLINIC | Age: 71
End: 2021-06-29
Payer: MEDICARE

## 2021-06-29 DIAGNOSIS — N18.31 STAGE 3A CHRONIC KIDNEY DISEASE (HCC): Primary | ICD-10-CM

## 2021-06-29 DIAGNOSIS — I10 HYPERTENSION, UNSPECIFIED TYPE: ICD-10-CM

## 2021-06-29 LAB
ANION GAP SERPL CALCULATED.3IONS-SCNC: 5 MMOL/L (ref 4–13)
BUN SERPL-MCNC: 21 MG/DL (ref 5–25)
CALCIUM SERPL-MCNC: 9.7 MG/DL (ref 8.3–10.1)
CHLORIDE SERPL-SCNC: 105 MMOL/L (ref 100–108)
CO2 SERPL-SCNC: 27 MMOL/L (ref 21–32)
CREAT SERPL-MCNC: 1.1 MG/DL (ref 0.6–1.3)
GFR SERPL CREATININE-BSD FRML MDRD: 50 ML/MIN/1.73SQ M
GLUCOSE SERPL-MCNC: 112 MG/DL (ref 65–140)
POTASSIUM SERPL-SCNC: 4.3 MMOL/L (ref 3.5–5.3)
SODIUM SERPL-SCNC: 137 MMOL/L (ref 136–145)

## 2021-06-29 PROCEDURE — 80048 BASIC METABOLIC PNL TOTAL CA: CPT

## 2022-07-11 ENCOUNTER — APPOINTMENT (OUTPATIENT)
Dept: LAB | Facility: CLINIC | Age: 72
End: 2022-07-11
Payer: MEDICARE

## 2022-07-11 DIAGNOSIS — N18.30 STAGE 3 CHRONIC KIDNEY DISEASE, UNSPECIFIED WHETHER STAGE 3A OR 3B CKD (HCC): ICD-10-CM

## 2022-07-11 DIAGNOSIS — R53.83 FATIGUE, UNSPECIFIED TYPE: ICD-10-CM

## 2022-07-11 DIAGNOSIS — E55.9 VITAMIN D DEFICIENCY: ICD-10-CM

## 2022-07-11 LAB
25(OH)D3 SERPL-MCNC: 32.8 NG/ML (ref 30–100)
ALBUMIN SERPL BCP-MCNC: 3.7 G/DL (ref 3.5–5)
ALP SERPL-CCNC: 134 U/L (ref 46–116)
ALT SERPL W P-5'-P-CCNC: 67 U/L (ref 12–78)
ANION GAP SERPL CALCULATED.3IONS-SCNC: 5 MMOL/L (ref 4–13)
AST SERPL W P-5'-P-CCNC: 34 U/L (ref 5–45)
BILIRUB SERPL-MCNC: 0.55 MG/DL (ref 0.2–1)
BUN SERPL-MCNC: 18 MG/DL (ref 5–25)
CALCIUM SERPL-MCNC: 9.7 MG/DL (ref 8.3–10.1)
CHLORIDE SERPL-SCNC: 108 MMOL/L (ref 100–108)
CO2 SERPL-SCNC: 28 MMOL/L (ref 21–32)
CREAT SERPL-MCNC: 0.95 MG/DL (ref 0.6–1.3)
ERYTHROCYTE [DISTWIDTH] IN BLOOD BY AUTOMATED COUNT: 13.2 % (ref 11.6–15.1)
GFR SERPL CREATININE-BSD FRML MDRD: 59 ML/MIN/1.73SQ M
GLUCOSE P FAST SERPL-MCNC: 100 MG/DL (ref 65–99)
HCT VFR BLD AUTO: 46.3 % (ref 34.8–46.1)
HGB BLD-MCNC: 15.3 G/DL (ref 11.5–15.4)
MCH RBC QN AUTO: 30.1 PG (ref 26.8–34.3)
MCHC RBC AUTO-ENTMCNC: 33 G/DL (ref 31.4–37.4)
MCV RBC AUTO: 91 FL (ref 82–98)
PLATELET # BLD AUTO: 260 THOUSANDS/UL (ref 149–390)
PMV BLD AUTO: 10.5 FL (ref 8.9–12.7)
POTASSIUM SERPL-SCNC: 4.7 MMOL/L (ref 3.5–5.3)
PROT SERPL-MCNC: 7.6 G/DL (ref 6.4–8.2)
RBC # BLD AUTO: 5.09 MILLION/UL (ref 3.81–5.12)
SODIUM SERPL-SCNC: 141 MMOL/L (ref 136–145)
WBC # BLD AUTO: 8.24 THOUSAND/UL (ref 4.31–10.16)

## 2022-07-11 PROCEDURE — 85027 COMPLETE CBC AUTOMATED: CPT

## 2022-07-11 PROCEDURE — 80053 COMPREHEN METABOLIC PANEL: CPT

## 2022-07-11 PROCEDURE — 36415 COLL VENOUS BLD VENIPUNCTURE: CPT

## 2022-07-11 PROCEDURE — 82306 VITAMIN D 25 HYDROXY: CPT

## 2022-10-28 ENCOUNTER — APPOINTMENT (OUTPATIENT)
Dept: LAB | Facility: CLINIC | Age: 72
End: 2022-10-28

## 2023-07-31 ENCOUNTER — APPOINTMENT (OUTPATIENT)
Age: 73
End: 2023-07-31
Payer: MEDICARE

## 2023-07-31 DIAGNOSIS — E78.5 HYPERLIPIDEMIA, UNSPECIFIED HYPERLIPIDEMIA TYPE: ICD-10-CM

## 2023-07-31 DIAGNOSIS — R53.83 FATIGUE, UNSPECIFIED TYPE: ICD-10-CM

## 2023-07-31 DIAGNOSIS — I10 HYPERTENSION, UNSPECIFIED TYPE: ICD-10-CM

## 2023-07-31 LAB
ALBUMIN SERPL BCP-MCNC: 3.9 G/DL (ref 3.5–5)
ALP SERPL-CCNC: 116 U/L (ref 46–116)
ALT SERPL W P-5'-P-CCNC: 38 U/L (ref 12–78)
ANION GAP SERPL CALCULATED.3IONS-SCNC: 5 MMOL/L
AST SERPL W P-5'-P-CCNC: 32 U/L (ref 5–45)
BILIRUB SERPL-MCNC: 0.56 MG/DL (ref 0.2–1)
BUN SERPL-MCNC: 24 MG/DL (ref 5–25)
CALCIUM SERPL-MCNC: 9.5 MG/DL (ref 8.3–10.1)
CHLORIDE SERPL-SCNC: 106 MMOL/L (ref 96–108)
CHOLEST SERPL-MCNC: 135 MG/DL
CO2 SERPL-SCNC: 27 MMOL/L (ref 21–32)
CREAT SERPL-MCNC: 1.07 MG/DL (ref 0.6–1.3)
ERYTHROCYTE [DISTWIDTH] IN BLOOD BY AUTOMATED COUNT: 13.2 % (ref 11.6–15.1)
GFR SERPL CREATININE-BSD FRML MDRD: 51 ML/MIN/1.73SQ M
GLUCOSE P FAST SERPL-MCNC: 110 MG/DL (ref 65–99)
HCT VFR BLD AUTO: 46.9 % (ref 34.8–46.1)
HDLC SERPL-MCNC: 45 MG/DL
HGB BLD-MCNC: 15.2 G/DL (ref 11.5–15.4)
LDLC SERPL CALC-MCNC: 60 MG/DL (ref 0–100)
MCH RBC QN AUTO: 29.9 PG (ref 26.8–34.3)
MCHC RBC AUTO-ENTMCNC: 32.4 G/DL (ref 31.4–37.4)
MCV RBC AUTO: 92 FL (ref 82–98)
NONHDLC SERPL-MCNC: 90 MG/DL
PLATELET # BLD AUTO: 313 THOUSANDS/UL (ref 149–390)
PMV BLD AUTO: 10.4 FL (ref 8.9–12.7)
POTASSIUM SERPL-SCNC: 4.1 MMOL/L (ref 3.5–5.3)
PROT SERPL-MCNC: 7.8 G/DL (ref 6.4–8.4)
RBC # BLD AUTO: 5.08 MILLION/UL (ref 3.81–5.12)
SODIUM SERPL-SCNC: 138 MMOL/L (ref 135–147)
TRIGL SERPL-MCNC: 150 MG/DL
WBC # BLD AUTO: 9.84 THOUSAND/UL (ref 4.31–10.16)

## 2023-07-31 PROCEDURE — 36415 COLL VENOUS BLD VENIPUNCTURE: CPT

## 2023-07-31 PROCEDURE — 85027 COMPLETE CBC AUTOMATED: CPT

## 2023-07-31 PROCEDURE — 80061 LIPID PANEL: CPT

## 2023-07-31 PROCEDURE — 80053 COMPREHEN METABOLIC PANEL: CPT

## 2024-01-24 NOTE — ASSESSMENT & PLAN NOTE
[facility]     DISCHARGE SUMMARY     Hannah Boss Patient Status:  Inpatient    1948 MRN [id number]   Location [facility] 3W/SW Attending No att. providers found   Hosp Day # 5 PCP None Pcp     DATE OF ADMISSION: 2024  DATE OF DISCHARGE: 2024   DISPOSITION: home  CONDITION ON DISCHARGE: good    DISCHARGE DIAGNOSES:  Dental abscess  Palpitations  PSVT  DICK  Hypokalemia  Dehydration  Metabolic acidosis  Generalized weakness  History of retroperitoneal leiomyosarcoma persisting disease  Pulmonary hypertension  Aortic stenosis  Chronic left lower extremity edema  History of C. difficile  Hypertension  Dyslipidemia  Osteoarthritis  Rheumatoid arthritis  History of DVT    HISTORY OF PRESENT ILLNESS (COPIED FROM ADMISSION H&P)  The patient is a 75-year-old  female with known retroperitoneal leiomyosarcoma who presented to the emergency department for evaluation of chest pain, shortness of breath.  Upon arrival noted to be in supraventricular tachycardia rhythm.  She was given adenosine and Cardizem.  Converted to sinus rhythm.  CBC showed hemoglobin of 11.2 which is stable.  Chemistry showed BUN and creatinine of 40 and 1.99; GFR 26, below her baseline; bicarbonate 20; chloride 115.  B natriuretic peptide 200.  Chest x-ray showed no acute findings.  VQ scan still pending.  Patient will be admitted to the hospital for further management.     HOSPITAL COURSE:  Patient was admitted, seen by cardiology.  Ennice to have some palpitations possibly correlating with PSVT, PVCs.  Started on calcium channel blocker, dose titrated up.  Patient felt very good with this.  Echocardiogram was done, results reassuring.  Being patient seen by oral surgery for her dental abscess and infection.  Taken to the OR for removal of 3 teeth and drainage of abscess.  Antibiotics continued with Unasyn.  She will finish 14 days of Augmentin as outpatient per oral surgery recommendations.  Pain  · Heart rate is better controlled  · Continue diltiazem 360 mg daily by mouth  · Continue metoprolol tartrate 50 mg t i d  P o    · Anticoagulation with apixaban  · DC home on this regimen was very well-controlled.  Eliquis had to be held for few days due to surgery, restarted prior to discharge.    Patient understands return to the emergency room for increased pain, fever, discharge, shortness of breath, chest pain, new neurologic symptoms, other concerning symptoms.    PHYSICAL EXAM:  Temp:  [97.9 °F (36.6 °C)-98.7 °F (37.1 °C)] 98.7 °F (37.1 °C)  Pulse:  [63-85] 63  Resp:  [16-20] 16  BP: (124-141)/(48-61) 128/48  SpO2:  [93 %-94 %] 94 %  Gen: A+Ox3.  No distress.   HEENT: NCAT, neck supple, no carotid bruit.  Improved facial swelling  CV: RRR, S1S2, and intact distal pulses. No gallop, rub, murmur.  Pulm: Effort and breath sounds normal. No distress, wheezes, rales, rhonchi.  Abd: Soft, NTND, BS normal, no mass, no HSM, no rebound/guarding.   Neuro: Normal reflexes, CN. Sensory/motor exams grossly normal deficit. Coordination  and gait normal.   MS: No joint effusions.  No peripheral edema.  Skin: Skin is warm and dry. No rashes, erythema, diaphoresis.   Psych: Normal mood and affect. Behavior and judgment normal.     DISCHARGE MEDICATIONS     Discharge Medications        START taking these medications        Instructions Prescription details   amoxicillin clavulanate 875-125 MG Tabs  Commonly known as: Augmentin      Take 1 tablet by mouth 2 (two) times daily for 14 days.   Stop taking on: February 7, 2024  Quantity: 28 tablet  Refills: 0     dilTIAZem  MG Cp24  Commonly known as: Cardizem CD      Take 1 capsule (120 mg total) by mouth daily.   Stop taking on: February 23, 2024  Quantity: 30 capsule  Refills: 0            CHANGE how you take these medications        Instructions Prescription details   cholecalciferol 50 MCG (2000 UT) Tabs  What changed: Another medication with the same name was removed. Continue taking this medication, and follow the directions you see here.      Take 1 tablet (2,000 Units total) by mouth daily.   Refills: 0     oxyCODONE-acetaminophen  MG  Tabs  Commonly known as: Percocet  What changed: Another medication with the same name was removed. Continue taking this medication, and follow the directions you see here.      Take 1 tablet by mouth every 4 (four) hours as needed for Pain (max 6/day).   Quantity: 20 tablet  Refills: 0            CONTINUE taking these medications        Instructions Prescription details   apixaban 5 MG Tabs  Commonly known as: Eliquis      Take 1 tablet (5 mg total) by mouth 2 (two) times daily.   Quantity: 60 tablet  Refills: 0     atorvastatin 40 MG Tabs  Commonly known as: Lipitor      Take 1 tablet (40 mg total) by mouth. AT BEDTIME   Refills: 3     diazePAM 10 MG Tabs  Commonly known as: Valium      Take 1 tablet (10 mg total) by mouth every 8 (eight) hours as needed (MAX 3/DAY).   Stop taking on: April 4, 2024  Quantity: 90 tablet  Refills: 2     ferrous sulfate 325 (65 FE) MG Tbec      Take 1 tablet (325 mg total) by mouth See Admin Instructions. Three times a week   Refills: 0     furosemide 20 MG Tabs  Commonly known as: Lasix      Take 1 tablet (20 mg total) by mouth 3 (three) times a week.   Quantity: 30 tablet  Refills: 1     gabapentin 300 MG Caps  Commonly known as: Neurontin      Take 1 capsule (300 mg total) by mouth nightly.   Refills: 0     meclizine 25 MG Tabs  Commonly known as: Antivert      Take 1 tablet (25 mg total) by mouth.   Refills: 0            STOP taking these medications      amLODIPine 10 MG Tabs  Commonly known as: Norvasc        hydrALAZINE 50 MG Tabs  Commonly known as: Apresoline        isosorbide mononitrate ER 60 MG Tb24  Commonly known as: Imdur        predniSONE 20 MG Tabs  Commonly known as: Deltasone                  Where to Get Your Medications        These medications were sent to [facility] [address] [place], [place] - [address] [phone], [phone]  [address] [place] [address]      Phone: [phone]   amoxicillin clavulanate 875-125 MG Tabs  dilTIAZem  MG  Cp24  oxyCODONE-acetaminophen  MG Tabs         CONSULTANTS  Consultants         Provider   Role Specialty     Art Cordon DDS  Consulting Physician DENTISTRY, GENERAL     Tono Collier MD  Consulting Physician Interventional, Cardiology            FOLLOW UP:  Art Cordon DDS  [address]  [address]  [place] [address]  [phone]    Follow up in 1 week(s)      PCP    Follow up in 1 week(s)  Post Discharge Followup    Abdirahman Alejandro MD  [address]  [address]  [place] [address]  [phone]    Follow up  As needed    The above plan and follow-up instructions were reviewed with the patient and they verbalized understanding and agreement.  They understand to return to the emergency room for any concerning signs or symptoms.  Greater than 30 minutes spent on discharge.  -----------------------    Hospital Discharge Diagnoses:  Dental abscess    Lace+ Score: 81  59-90 High Risk  29-58 Medium Risk  0-28   Low Risk.    TCM Follow-Up Recommendation:  LACE > 58: High Risk of readmission after discharge from the hospital.

## 2024-03-14 NOTE — PROGRESS NOTES
Progress Note - Mary Stroud 1950, 78 y o  female MRN: [id number]    Unit/Bed#: ICU 06 Encounter: [id number]    Primary Care Provider: Amy Riley MD   Date and time admitted to hospital: 12/17/2019 12:27 PM        Heart failure with preserved ejection fraction [facility])  Assessment & Plan  Wt Readings from Last 3 Encounters:   12/19/19 124 kg (273 lb 13 oz)   09/12/19 115 kg (254 lb)   09/02/19 122 kg (270 lb)     · BNP is elevated at 354  · Chest x-ray is concerning for pulmonary vascular congestion and bilateral pleural effusions  · Patient is status post 80 mg of IV Lasix today in the ER  · On oral Lasix  · Monitor daily weight, input and output  · Will hold off on reordering a 2D echocardiogram since 1 was done a few months ago  · Defer to Cardiology for further management        Morbid obesity ([facility] [address] )  Assessment & Plan  · BMI is 51  · Dietary, lifestyle, and weight loss modification counseling was provided    Essential hypertension  Assessment & Plan  · Blood pressure well controlled  · Continue amlodipine and lisinopril    * Paroxysmal atrial fibrillation (HCC)  Assessment & Plan  · Rates controlled on oral medications currently  · Continue apixaban for anticoagulation purposes  · Of note, patient is status post a SUSIE and a cardioversion in August of 2019  · Appreciate Cardiology input      VTE Pharmacologic Prophylaxis: Pharmacologic: Apixaban (Eliquis)    Patient Centered Rounds: I have performed bedside rounds with nursing staff today  Discussions with Specialists or Other Care Team Provider: n/a  Education and Discussions with Family / Patient: patient    Current Length of Stay: 2 day(s)    Current Patient Status: Inpatient   Certification Statement: The patient will continue to require additional inpatient hospital stay due to afib with rvr    Discharge Plan: likely home tomorrow    Code Status: Level 1 - Full Code    Subjective:   Patient seen examined  No acute events overnight    Feels much better today    Objective:     Vitals:   Temp (24hrs), Av 4 °F (36 9 °C), Min:97 1 °F (36 2 °C), Max:99 8 °F (37 7 °C)    Temp:  [97 1 °F (36 2 °C)-99 8 °F (37 7 °C)] 97 1 °F (36 2 °C)  HR:  [] 100  Resp:  [11-42] 32  BP: (103-129)/(53-79) 122/73  SpO2:  [88 %-93 %] 91 %  Body mass index is 50 08 kg/m²  Input and Output Summary (last 24 hours): Intake/Output Summary (Last 24 hours) at 2019 0851  Last data filed at 2019 0601  Gross per 24 hour   Intake 876 ml   Output 2650 ml   Net -1774 ml       Physical Exam:     Physical Exam   Constitutional: She is oriented to person, place, and time  She appears well-developed and well-nourished  HENT:   Head: Normocephalic and atraumatic  Eyes: Pupils are equal, round, and reactive to light  EOM are normal    Neck: Normal range of motion  Neck supple  Cardiovascular: Normal rate  Irregularly irregular   Pulmonary/Chest: Effort normal and breath sounds normal    Abdominal: Soft  Bowel sounds are normal    Obese   Musculoskeletal: Normal range of motion  She exhibits edema  Neurological: She is alert and oriented to person, place, and time  Skin: Skin is warm  Capillary refill takes less than 2 seconds  Psychiatric: She has a normal mood and affect  Her behavior is normal  Judgment and thought content normal    Nursing note and vitals reviewed        Additional Data:     Labs:    Results from last 7 days   Lab Units 19  0442   WBC Thousand/uL 7 80   HEMOGLOBIN g/dL 10 6*   HEMATOCRIT % 33 3*   PLATELETS Thousands/uL 234   NEUTROS PCT % 71   LYMPHS PCT % 18*   MONOS PCT % 9   EOS PCT % 2     Results from last 7 days   Lab Units 19  0442   POTASSIUM mmol/L 3 8   CHLORIDE mmol/L 108*   CO2 mmol/L 26   BUN mg/dL 24   CREATININE mg/dL 0 74   CALCIUM mg/dL 8 8   ALK PHOS U/L 81   ALT U/L 17   AST U/L 17     Results from last 7 days   Lab Units 19  1244   INR  1 52*               * I Have Reviewed All Lab Data Listed Above   * Additional Pertinent Lab Tests Reviewed: [address] Admission  Reviewed    Imaging:  Imaging Reports Reviewed Today Include: n/a    Recent Cultures (last 7 days):           Last 24 Hours Medication List:     Current Facility-Administered Medications:  acetaminophen 650 mg Oral Q6H PRN Mariana Guerra MD    apixaban 5 mg Oral BID Mariana Guerra MD    atorvastatin 40 mg Oral Daily With [address] Jose Jain MD    diltiazem 1-15 mg/hr Intravenous Titrated Mariana Guerra MD Last Rate: 15 mg/hr (12/18/19 0837)   diltiazem 90 mg Oral Q6H [facility] Karen Boswell MD    docusate sodium 100 mg Oral BID Mariana Guerra MD    escitalopram 10 mg Oral Daily Mariana Guerra MD    furosemide 20 mg Oral Daily Natalie Ramirez MD    lisinopril 40 mg Oral Daily Mariana Guerra MD    melatonin 9 mg Oral HS Oneta Camera, PA-C    metoprolol 5 mg Intravenous Q6H PRN Oneta Camera, PA-C    metoprolol tartrate 50 mg Oral Q12H [facility] Karen Boswell MD    ondansetron 4 mg Intravenous Q6H PRN Mariana Guerra MD         Today, Patient Was Seen By: Natalie Ramirez MD    ** Please Note: Dictation voice to text software may have been used in the creation of this document   ** Per patient's son (Favian), patient has had episodes of aggression/agitation in the past few weeks, especially during hospitalization at [place].   - Advised patient's son that steroids may worsen these episodes of agitation  - Will start melatonin as son notes patient has difficulty sleeping; continue home gabapentin  - Patient also taking acetaminophen with codeine at home, per son, "probably more than she should be" (iSTOP in chart) for generalized pain iso degenerative disc disease, knee arthritis

## 2024-04-15 ENCOUNTER — APPOINTMENT (OUTPATIENT)
Age: 74
End: 2024-04-15
Payer: MEDICARE

## 2024-04-15 DIAGNOSIS — Z79.01 LONG TERM (CURRENT) USE OF ANTICOAGULANTS: ICD-10-CM

## 2024-04-15 LAB
ALBUMIN SERPL BCP-MCNC: 4 G/DL (ref 3.5–5)
ALP SERPL-CCNC: 127 U/L (ref 34–104)
ALT SERPL W P-5'-P-CCNC: 27 U/L (ref 7–52)
ANION GAP SERPL CALCULATED.3IONS-SCNC: 12 MMOL/L (ref 4–13)
AST SERPL W P-5'-P-CCNC: 25 U/L (ref 13–39)
BILIRUB SERPL-MCNC: 0.66 MG/DL (ref 0.2–1)
BUN SERPL-MCNC: 19 MG/DL (ref 5–25)
CALCIUM SERPL-MCNC: 9.2 MG/DL (ref 8.4–10.2)
CHLORIDE SERPL-SCNC: 102 MMOL/L (ref 96–108)
CO2 SERPL-SCNC: 27 MMOL/L (ref 21–32)
CREAT SERPL-MCNC: 1.12 MG/DL (ref 0.6–1.3)
ERYTHROCYTE [DISTWIDTH] IN BLOOD BY AUTOMATED COUNT: 13.2 % (ref 11.6–15.1)
GFR SERPL CREATININE-BSD FRML MDRD: 48 ML/MIN/1.73SQ M
GLUCOSE SERPL-MCNC: 108 MG/DL (ref 65–140)
HCT VFR BLD AUTO: 47.5 % (ref 34.8–46.1)
HGB BLD-MCNC: 15.2 G/DL (ref 11.5–15.4)
MCH RBC QN AUTO: 29.7 PG (ref 26.8–34.3)
MCHC RBC AUTO-ENTMCNC: 32 G/DL (ref 31.4–37.4)
MCV RBC AUTO: 93 FL (ref 82–98)
PLATELET # BLD AUTO: 296 THOUSANDS/UL (ref 149–390)
PMV BLD AUTO: 10.5 FL (ref 8.9–12.7)
POTASSIUM SERPL-SCNC: 4.4 MMOL/L (ref 3.5–5.3)
PROT SERPL-MCNC: 7 G/DL (ref 6.4–8.4)
RBC # BLD AUTO: 5.12 MILLION/UL (ref 3.81–5.12)
SODIUM SERPL-SCNC: 141 MMOL/L (ref 135–147)
WBC # BLD AUTO: 7.42 THOUSAND/UL (ref 4.31–10.16)

## 2024-04-15 PROCEDURE — 36415 COLL VENOUS BLD VENIPUNCTURE: CPT

## 2024-04-15 PROCEDURE — 80053 COMPREHEN METABOLIC PANEL: CPT

## 2024-04-15 PROCEDURE — 85027 COMPLETE CBC AUTOMATED: CPT

## 2024-05-08 ENCOUNTER — HOSPITAL ENCOUNTER (OUTPATIENT)
Dept: ULTRASOUND IMAGING | Facility: HOSPITAL | Age: 74
Discharge: HOME | End: 2024-05-08
Payer: MEDICARE

## 2024-05-08 ENCOUNTER — APPOINTMENT (OUTPATIENT)
Dept: LAB | Facility: CLINIC | Age: 74
End: 2024-05-08
Payer: MEDICARE

## 2024-05-08 DIAGNOSIS — N18.30 STAGE 3 CHRONIC KIDNEY DISEASE, UNSPECIFIED WHETHER STAGE 3A OR 3B CKD (HCC): ICD-10-CM

## 2024-05-08 LAB
ANION GAP SERPL CALCULATED.3IONS-SCNC: 10 MMOL/L (ref 4–13)
BNP SERPL-MCNC: 226 PG/ML (ref 0–100)
BUN SERPL-MCNC: 22 MG/DL (ref 5–25)
CALCIUM SERPL-MCNC: 9 MG/DL (ref 8.4–10.2)
CHLORIDE SERPL-SCNC: 102 MMOL/L (ref 96–108)
CO2 SERPL-SCNC: 27 MMOL/L (ref 21–32)
CREAT SERPL-MCNC: 0.94 MG/DL (ref 0.6–1.3)
CREAT UR-MCNC: 182.2 MG/DL
GFR SERPL CREATININE-BSD FRML MDRD: 59 ML/MIN/1.73SQ M
GLUCOSE P FAST SERPL-MCNC: 106 MG/DL (ref 65–99)
MICROALBUMIN UR-MCNC: 10 MG/L
MICROALBUMIN/CREAT 24H UR: 5 MG/G CREATININE (ref 0–30)
POTASSIUM SERPL-SCNC: 4.3 MMOL/L (ref 3.5–5.3)
SODIUM SERPL-SCNC: 139 MMOL/L (ref 135–147)

## 2024-05-08 PROCEDURE — 82570 ASSAY OF URINE CREATININE: CPT

## 2024-05-08 PROCEDURE — 82043 UR ALBUMIN QUANTITATIVE: CPT

## 2024-05-08 PROCEDURE — 36415 COLL VENOUS BLD VENIPUNCTURE: CPT

## 2024-05-08 PROCEDURE — 80048 BASIC METABOLIC PNL TOTAL CA: CPT

## 2024-05-08 PROCEDURE — 83880 ASSAY OF NATRIURETIC PEPTIDE: CPT

## 2024-05-08 PROCEDURE — 76775 US EXAM ABDO BACK WALL LIM: CPT

## 2024-06-18 NOTE — PROGRESS NOTES
Progress Note - Juan F Cisneros 1950, 78 y o  female MRN: [id number]    Unit/Bed#: ICU 03 Encounter: [id number]    Primary Care Provider: Janee Calabrese MD   Date and time admitted to hospital: 8/26/2019  9:27 AM        * Sepsis due to pneumonia [facility])  Assessment & Plan  · CTA chest with multifocal pneumonia - however patient's clinical history is not completely convincing for a pneumonia  · Will continue IV antibiotics with ceftriaxone/Zithromax for now, white blood cell count has improved (patient has a remote history of penicillin allergy, however is tolerating the ceftriaxone well)  · Urine for Streptococcus pneumoniae and Legionella antigen is negative  · Blood cultures negative thus far  · In view of the bilateral pleural effusions, will consult interventional Radiology for the evaluation of a possible diagnostic and therapeutic thoracentesis  Pleural fluid labs have been ordered    · Continue management here in the ICU    Atrial fibrillation with rapid ventricular response (HCC)  Assessment & Plan  · This is new onset atrial fibrillation  · Patient is currently maxed out on a Cardizem drip, she is also status post digoxin therapy and oral Cardizem was started overnight  · 2D echocardiogram for the most part is within normal limits, trace mitral regurgitation was noted  · Await formal cardiology input for further medication optimization  · Defer anticoagulation initiation to Cardiology  · Continue other current cardiac based medications which include aspirin, and atorvastatin    Acute respiratory failure with hypoxia ([address] )  Assessment & Plan  · Most likely secondary to the bilateral pleural effusions, BNP was mildly elevated, however the echocardiogram was normal therefore doubt clinically that CHF is the primary underlying cause  · Will monitor oxygen requirements especially more so after the thoracentesis, hold off on further Lasix for now    Morbid obesity ([address] )  Assessment & Plan  · BMI of 54 61  · Dietary, weight loss, and lifestyle modification counseling provided  ·  Supportive care  · Nutrition eval    Essential hypertension  Assessment & Plan  · BP currently stable  · Will hold patient's home medications while patient is on Cardizem drip    Major depressive disorder in full remission ([facility] [address] )  Assessment & Plan  · Continue Lexapro      VTE Pharmacologic Prophylaxis: Pharmacologic: Enoxaparin (Lovenox)    Patient Centered Rounds: I have performed bedside rounds with nursing staff today  Discussions with Specialists or Other Care Team Provider:  Critical Care, Cardiology, case management, nursing, Pulmonary, and pharmacy  Education and Discussions with Family / Patient:  Patient was brought up to par with the plan of care for today, all questions answered to her satisfaction    Current Length of Stay: 1 day(s)    Current Patient Status: Inpatient   Certification Statement: The patient will continue to require additional inpatient hospital stay due to The need for continued IV antibiotics, IV antiarrhythmics as well as the impending cardiology, Pulmonary and Interventional Radiology consultations    Discharge Plan:  Discharge planning will commence once the patient is medically stable    Code Status: Level 1 - Full Code    Subjective:   Patient seen and examined  Patient complains of feeling tired  Denies any pain or discomfort  Goes on to state that she feels better than prior to coming into the hospital    Objective:     Vitals:   Temp (24hrs), Av 8 °F (37 1 °C), Min:98 3 °F (36 8 °C), Max:100 7 °F (38 2 °C)    Temp:  [98 3 °F (36 8 °C)-100 7 °F (38 2 °C)] 100 7 °F (38 2 °C)  HR:  [125-163] 136  Resp:  [18-90] 27  BP: (102-147)/() 140/72  SpO2:  [85 %-98 %] 91 %  Body mass index is 54 61 kg/m²  Input and Output Summary (last 24 hours):        Intake/Output Summary (Last 24 hours) at 2019 0954  Last data filed at 2019 0553  Gross per 24 hour   Intake 1435 25 ml   Output 2000 ml   Net -564 75 ml       Physical Exam:     Physical Exam   Constitutional: She is oriented to person, place, and time  She appears well-developed and well-nourished  HENT:   Head: Normocephalic and atraumatic  Nose: Nose normal    Mouth/Throat: Oropharynx is clear and moist    Eyes: Pupils are equal, round, and reactive to light  Conjunctivae and EOM are normal    Neck: Normal range of motion  Neck supple  No JVD present  No thyromegaly present  Cardiovascular: Normal rate, regular rhythm and intact distal pulses  Exam reveals no gallop and no friction rub  No murmur heard  Pulmonary/Chest: No respiratory distress  Increased respiratory effort  Positive use of the accessory muscles of respiration  Tight air entry bilaterally  Decreased breath sounds bilaterally at the bases   Abdominal: Soft  Bowel sounds are normal  She exhibits no distension and no mass  There is no tenderness  There is no guarding  Musculoskeletal: Normal range of motion  She exhibits no edema  Lymphadenopathy:     She has no cervical adenopathy  Neurological: She is alert and oriented to person, place, and time  No cranial nerve deficit  Skin: Skin is warm  No rash noted  No erythema  Psychiatric: She has a normal mood and affect  Her behavior is normal    Vitals reviewed  Additional Data:     Labs:    Results from last 7 days   Lab Units 08/27/19  0503   WBC Thousand/uL 14 30*   HEMOGLOBIN g/dL 12 1   HEMATOCRIT % 36 2*   PLATELETS Thousands/uL 237   NEUTROS PCT % 82*   LYMPHS PCT % 8*   MONOS PCT % 10   EOS PCT % 0     Results from last 7 days   Lab Units 08/27/19  0503   POTASSIUM mmol/L 3 7   CHLORIDE mmol/L 108*   CO2 mmol/L 22   BUN mg/dL 21   CREATININE mg/dL 0 75   CALCIUM mg/dL 8 7   ALK PHOS U/L 116   ALT U/L 22   AST U/L 18     Results from last 7 days   Lab Units 08/26/19  0918   INR  1 26               * I Have Reviewed All Lab Data Listed Above  * Additional Pertinent Lab Tests Reviewed:  All Labs For Current Hospital Admission  Reviewed    Imaging:  Imaging Reports Reviewed Today Include:  None    Recent Cultures (last 7 days):     Results from last 7 days   Lab Units 08/26/19  1526 08/26/19  1112   LEGIONELLA URINARY ANTIGEN  Negative Negative       Last 24 Hours Medication List:     Current Facility-Administered Medications:  acetaminophen 650 mg Oral Q6H PRN Diana Matute MD    aspirin 81 mg Oral Daily Diana Matute MD    atorvastatin 40 mg Oral Daily With Gracie Aceves MD    diltiazem (CARDIZEM) 125 mg in sodium chloride 0 9% 125 mL infusion 1-15 mg/hr Intravenous Titrated Diana Matute MD Last Rate: 15 mg/hr (08/27/19 0501)   diltiazem 120 mg Oral Q8H [facility]    enoxaparin 40 mg Subcutaneous Daily Diana Matute MD    escitalopram 20 mg Oral Daily Diana Matute MD    ondansetron 4 mg Intravenous Q6H PRN Diana Matute MD         Today, Patient Was Seen By: Rosana Perales MD    ** Please Note: Dictation voice to text software may have been used in the creation of this document   ** Detail Level: Zone

## 2024-10-08 ENCOUNTER — APPOINTMENT (OUTPATIENT)
Age: 74
End: 2024-10-08
Payer: MEDICARE

## 2024-10-08 DIAGNOSIS — I10 ESSENTIAL HYPERTENSION, MALIGNANT: ICD-10-CM

## 2024-10-08 DIAGNOSIS — I25.119 ATHEROSCLEROSIS OF NATIVE CORONARY ARTERY WITH ANGINA PECTORIS, UNSPECIFIED WHETHER NATIVE OR TRANSPLANTED HEART (HCC): ICD-10-CM

## 2024-10-08 DIAGNOSIS — Z79.01 LONG TERM (CURRENT) USE OF ANTICOAGULANTS: ICD-10-CM

## 2024-10-08 DIAGNOSIS — I50.9 HEART FAILURE, UNSPECIFIED HF CHRONICITY, UNSPECIFIED HEART FAILURE TYPE (HCC): ICD-10-CM

## 2024-10-08 LAB
ALBUMIN SERPL BCG-MCNC: 4.1 G/DL (ref 3.5–5)
ALP SERPL-CCNC: 118 U/L (ref 34–104)
ALT SERPL W P-5'-P-CCNC: 41 U/L (ref 7–52)
ANION GAP SERPL CALCULATED.3IONS-SCNC: 11 MMOL/L (ref 4–13)
AST SERPL W P-5'-P-CCNC: 33 U/L (ref 13–39)
BILIRUB SERPL-MCNC: 0.66 MG/DL (ref 0.2–1)
BNP SERPL-MCNC: 107 PG/ML (ref 0–100)
BUN SERPL-MCNC: 16 MG/DL (ref 5–25)
CALCIUM SERPL-MCNC: 9.5 MG/DL (ref 8.4–10.2)
CHLORIDE SERPL-SCNC: 99 MMOL/L (ref 96–108)
CHOLEST SERPL-MCNC: 137 MG/DL
CO2 SERPL-SCNC: 28 MMOL/L (ref 21–32)
CREAT SERPL-MCNC: 0.89 MG/DL (ref 0.6–1.3)
CREAT UR-MCNC: 191.7 MG/DL
ERYTHROCYTE [DISTWIDTH] IN BLOOD BY AUTOMATED COUNT: 13.4 % (ref 11.6–15.1)
GFR SERPL CREATININE-BSD FRML MDRD: 63 ML/MIN/1.73SQ M
GLUCOSE P FAST SERPL-MCNC: 109 MG/DL (ref 65–99)
HCT VFR BLD AUTO: 46.5 % (ref 34.8–46.1)
HDLC SERPL-MCNC: 36 MG/DL
HGB BLD-MCNC: 15 G/DL (ref 11.5–15.4)
LDLC SERPL CALC-MCNC: 72 MG/DL (ref 0–100)
MCH RBC QN AUTO: 30.4 PG (ref 26.8–34.3)
MCHC RBC AUTO-ENTMCNC: 32.3 G/DL (ref 31.4–37.4)
MCV RBC AUTO: 94 FL (ref 82–98)
MICROALBUMIN UR-MCNC: 11.5 MG/L
MICROALBUMIN/CREAT 24H UR: 6 MG/G CREATININE (ref 0–30)
NONHDLC SERPL-MCNC: 101 MG/DL
PLATELET # BLD AUTO: 283 THOUSANDS/UL (ref 149–390)
PMV BLD AUTO: 10.3 FL (ref 8.9–12.7)
POTASSIUM SERPL-SCNC: 3.8 MMOL/L (ref 3.5–5.3)
PROT SERPL-MCNC: 6.9 G/DL (ref 6.4–8.4)
RBC # BLD AUTO: 4.94 MILLION/UL (ref 3.81–5.12)
SODIUM SERPL-SCNC: 138 MMOL/L (ref 135–147)
TRIGL SERPL-MCNC: 144 MG/DL
WBC # BLD AUTO: 7.93 THOUSAND/UL (ref 4.31–10.16)

## 2024-10-08 PROCEDURE — 82043 UR ALBUMIN QUANTITATIVE: CPT

## 2024-10-08 PROCEDURE — 82570 ASSAY OF URINE CREATININE: CPT

## 2024-10-08 PROCEDURE — 85027 COMPLETE CBC AUTOMATED: CPT

## 2024-10-08 PROCEDURE — 83880 ASSAY OF NATRIURETIC PEPTIDE: CPT

## 2024-10-08 PROCEDURE — 80061 LIPID PANEL: CPT

## 2024-10-08 PROCEDURE — 36415 COLL VENOUS BLD VENIPUNCTURE: CPT

## 2024-10-08 PROCEDURE — 80053 COMPREHEN METABOLIC PANEL: CPT

## 2025-04-11 ENCOUNTER — APPOINTMENT (OUTPATIENT)
Dept: LAB | Facility: CLINIC | Age: 75
End: 2025-04-11
Attending: INTERNAL MEDICINE
Payer: MEDICARE

## 2025-04-11 DIAGNOSIS — Z79.01 LONG TERM (CURRENT) USE OF ANTICOAGULANTS: ICD-10-CM

## 2025-04-11 LAB
ALBUMIN SERPL BCG-MCNC: 4.1 G/DL (ref 3.5–5)
ALP SERPL-CCNC: 106 U/L (ref 34–104)
ALT SERPL W P-5'-P-CCNC: 33 U/L (ref 7–52)
ANION GAP SERPL CALCULATED.3IONS-SCNC: 9 MMOL/L (ref 4–13)
AST SERPL W P-5'-P-CCNC: 26 U/L (ref 13–39)
BILIRUB SERPL-MCNC: 0.62 MG/DL (ref 0.2–1)
BUN SERPL-MCNC: 20 MG/DL (ref 5–25)
CALCIUM SERPL-MCNC: 9.5 MG/DL (ref 8.4–10.2)
CHLORIDE SERPL-SCNC: 102 MMOL/L (ref 96–108)
CO2 SERPL-SCNC: 28 MMOL/L (ref 21–32)
CREAT SERPL-MCNC: 1.04 MG/DL (ref 0.6–1.3)
ERYTHROCYTE [DISTWIDTH] IN BLOOD BY AUTOMATED COUNT: 12.8 % (ref 11.6–15.1)
GFR SERPL CREATININE-BSD FRML MDRD: 52 ML/MIN/1.73SQ M
GLUCOSE SERPL-MCNC: 78 MG/DL (ref 65–140)
HCT VFR BLD AUTO: 46.8 % (ref 34.8–46.1)
HGB BLD-MCNC: 15.4 G/DL (ref 11.5–15.4)
MCH RBC QN AUTO: 30.6 PG (ref 26.8–34.3)
MCHC RBC AUTO-ENTMCNC: 32.9 G/DL (ref 31.4–37.4)
MCV RBC AUTO: 93 FL (ref 82–98)
PLATELET # BLD AUTO: 315 THOUSANDS/UL (ref 149–390)
PMV BLD AUTO: 10.4 FL (ref 8.9–12.7)
POTASSIUM SERPL-SCNC: 4.2 MMOL/L (ref 3.5–5.3)
PROT SERPL-MCNC: 6.9 G/DL (ref 6.4–8.4)
RBC # BLD AUTO: 5.04 MILLION/UL (ref 3.81–5.12)
SODIUM SERPL-SCNC: 139 MMOL/L (ref 135–147)
WBC # BLD AUTO: 9.78 THOUSAND/UL (ref 4.31–10.16)

## 2025-04-11 PROCEDURE — 36415 COLL VENOUS BLD VENIPUNCTURE: CPT

## 2025-04-11 PROCEDURE — 80053 COMPREHEN METABOLIC PANEL: CPT

## 2025-04-11 PROCEDURE — 85027 COMPLETE CBC AUTOMATED: CPT

## 2025-06-20 ENCOUNTER — APPOINTMENT (OUTPATIENT)
Dept: LAB | Facility: CLINIC | Age: 75
End: 2025-06-20
Attending: INTERNAL MEDICINE
Payer: MEDICARE

## 2025-06-20 DIAGNOSIS — N18.30 CHRONIC RENAL IMPAIRMENT, STAGE 3 (MODERATE), UNSPECIFIED WHETHER STAGE 3A OR 3B CKD (HCC): ICD-10-CM

## 2025-06-20 LAB
ANION GAP SERPL CALCULATED.3IONS-SCNC: 6 MMOL/L (ref 4–13)
BUN SERPL-MCNC: 19 MG/DL (ref 5–25)
CALCIUM SERPL-MCNC: 9.3 MG/DL (ref 8.4–10.2)
CHLORIDE SERPL-SCNC: 101 MMOL/L (ref 96–108)
CO2 SERPL-SCNC: 30 MMOL/L (ref 21–32)
CREAT SERPL-MCNC: 1.03 MG/DL (ref 0.6–1.3)
GFR SERPL CREATININE-BSD FRML MDRD: 52 ML/MIN/1.73SQ M
GLUCOSE SERPL-MCNC: 99 MG/DL (ref 65–140)
POTASSIUM SERPL-SCNC: 4.5 MMOL/L (ref 3.5–5.3)
SODIUM SERPL-SCNC: 137 MMOL/L (ref 135–147)

## 2025-06-20 PROCEDURE — 36415 COLL VENOUS BLD VENIPUNCTURE: CPT

## 2025-06-20 PROCEDURE — 80048 BASIC METABOLIC PNL TOTAL CA: CPT
